# Patient Record
Sex: FEMALE | Race: WHITE | NOT HISPANIC OR LATINO | Employment: UNEMPLOYED | URBAN - METROPOLITAN AREA
[De-identification: names, ages, dates, MRNs, and addresses within clinical notes are randomized per-mention and may not be internally consistent; named-entity substitution may affect disease eponyms.]

---

## 2017-07-09 ENCOUNTER — HOSPITAL ENCOUNTER (EMERGENCY)
Facility: HOSPITAL | Age: 37
Discharge: HOME | End: 2017-07-10
Attending: EMERGENCY MEDICINE
Payer: COMMERCIAL

## 2017-07-09 ENCOUNTER — APPOINTMENT (EMERGENCY)
Dept: RADIOLOGY | Facility: HOSPITAL | Age: 37
End: 2017-07-09
Payer: COMMERCIAL

## 2017-07-09 VITALS
OXYGEN SATURATION: 100 % | DIASTOLIC BLOOD PRESSURE: 87 MMHG | SYSTOLIC BLOOD PRESSURE: 154 MMHG | TEMPERATURE: 97.9 F | HEART RATE: 100 BPM | BODY MASS INDEX: 34.04 KG/M2 | WEIGHT: 186.1 LBS | RESPIRATION RATE: 18 BRPM

## 2017-07-09 DIAGNOSIS — S63.502A LEFT WRIST SPRAIN, INITIAL ENCOUNTER: Primary | ICD-10-CM

## 2017-07-09 DIAGNOSIS — S63.92XA SPRAIN OF LEFT HAND, INITIAL ENCOUNTER: ICD-10-CM

## 2017-07-09 PROCEDURE — 73110 X-RAY EXAM OF WRIST: CPT

## 2017-07-09 PROCEDURE — 73130 X-RAY EXAM OF HAND: CPT

## 2017-07-10 PROCEDURE — 99283 EMERGENCY DEPT VISIT LOW MDM: CPT

## 2017-09-27 ENCOUNTER — APPOINTMENT (EMERGENCY)
Dept: ULTRASOUND IMAGING | Facility: HOSPITAL | Age: 37
End: 2017-09-27
Payer: COMMERCIAL

## 2017-09-27 ENCOUNTER — HOSPITAL ENCOUNTER (EMERGENCY)
Facility: HOSPITAL | Age: 37
Discharge: HOME | End: 2017-09-27
Attending: EMERGENCY MEDICINE | Admitting: EMERGENCY MEDICINE
Payer: COMMERCIAL

## 2017-09-27 VITALS
TEMPERATURE: 98.3 F | HEART RATE: 96 BPM | WEIGHT: 170 LBS | BODY MASS INDEX: 31.09 KG/M2 | DIASTOLIC BLOOD PRESSURE: 87 MMHG | SYSTOLIC BLOOD PRESSURE: 139 MMHG | RESPIRATION RATE: 16 BRPM | OXYGEN SATURATION: 100 %

## 2017-09-27 DIAGNOSIS — O20.0 THREATENED ABORTION: Primary | ICD-10-CM

## 2017-09-27 LAB
ABO GROUP BLD: NORMAL
ALBUMIN SERPL BCP-MCNC: 3.6 G/DL (ref 3.5–5)
ALP SERPL-CCNC: 69 U/L (ref 46–116)
ALT SERPL W P-5'-P-CCNC: 25 U/L (ref 12–78)
ANION GAP SERPL CALCULATED.3IONS-SCNC: 7 MMOL/L (ref 4–13)
AST SERPL W P-5'-P-CCNC: 13 U/L (ref 5–45)
B-HCG SERPL-ACNC: ABNORMAL MIU/ML
BASOPHILS # BLD AUTO: 0.02 THOUSANDS/ΜL (ref 0–0.1)
BASOPHILS NFR BLD AUTO: 0 % (ref 0–1)
BILIRUB SERPL-MCNC: 0.7 MG/DL (ref 0.2–1)
BLD GP AB SCN SERPL QL: NEGATIVE
BUN SERPL-MCNC: 6 MG/DL (ref 5–25)
CALCIUM SERPL-MCNC: 8.7 MG/DL (ref 8.3–10.1)
CHLORIDE SERPL-SCNC: 103 MMOL/L (ref 100–108)
CLARITY, POC: CLEAR
CO2 SERPL-SCNC: 27 MMOL/L (ref 21–32)
COLOR, POC: YELLOW
CREAT SERPL-MCNC: 0.45 MG/DL (ref 0.6–1.3)
EOSINOPHIL # BLD AUTO: 0.14 THOUSAND/ΜL (ref 0–0.61)
EOSINOPHIL NFR BLD AUTO: 2 % (ref 0–6)
ERYTHROCYTE [DISTWIDTH] IN BLOOD BY AUTOMATED COUNT: 16.5 % (ref 11.6–15.1)
EXT BILIRUBIN, UA: NORMAL
EXT BLOOD URINE: NORMAL
EXT GLUCOSE, UA: NORMAL
EXT KETONES: NORMAL
EXT NITRITE, UA: NORMAL
EXT PH, UA: 6
EXT PREG TEST URINE: POSITIVE
EXT PROTEIN, UA: 100
EXT SPECIFIC GRAVITY, UA: 1.01
EXT UROBILINOGEN: 0.2
GFR SERPL CREATININE-BSD FRML MDRD: 130 ML/MIN/1.73SQ M
GLUCOSE SERPL-MCNC: 90 MG/DL (ref 65–140)
HCT VFR BLD AUTO: 27.4 % (ref 34.8–46.1)
HGB BLD-MCNC: 9.8 G/DL (ref 11.5–15.4)
LYMPHOCYTES # BLD AUTO: 2.11 THOUSANDS/ΜL (ref 0.6–4.47)
LYMPHOCYTES NFR BLD AUTO: 25 % (ref 14–44)
MCH RBC QN AUTO: 33.2 PG (ref 26.8–34.3)
MCHC RBC AUTO-ENTMCNC: 35.8 G/DL (ref 31.4–37.4)
MCV RBC AUTO: 93 FL (ref 82–98)
MONOCYTES # BLD AUTO: 0.45 THOUSAND/ΜL (ref 0.17–1.22)
MONOCYTES NFR BLD AUTO: 5 % (ref 4–12)
NEUTROPHILS # BLD AUTO: 5.63 THOUSANDS/ΜL (ref 1.85–7.62)
NEUTS SEG NFR BLD AUTO: 68 % (ref 43–75)
PLATELET # BLD AUTO: 269 THOUSANDS/UL (ref 149–390)
PMV BLD AUTO: 9.9 FL (ref 8.9–12.7)
POTASSIUM SERPL-SCNC: 3.7 MMOL/L (ref 3.5–5.3)
PROT SERPL-MCNC: 7.1 G/DL (ref 6.4–8.2)
RBC # BLD AUTO: 2.95 MILLION/UL (ref 3.81–5.12)
RH BLD: POSITIVE
SODIUM SERPL-SCNC: 137 MMOL/L (ref 136–145)
SPECIMEN EXPIRATION DATE: NORMAL
WBC # BLD AUTO: 8.35 THOUSAND/UL (ref 4.31–10.16)
WBC # BLD EST: NORMAL 10*3/UL

## 2017-09-27 PROCEDURE — 99284 EMERGENCY DEPT VISIT MOD MDM: CPT

## 2017-09-27 PROCEDURE — 86850 RBC ANTIBODY SCREEN: CPT | Performed by: PHYSICIAN ASSISTANT

## 2017-09-27 PROCEDURE — 84702 CHORIONIC GONADOTROPIN TEST: CPT | Performed by: PHYSICIAN ASSISTANT

## 2017-09-27 PROCEDURE — 86900 BLOOD TYPING SEROLOGIC ABO: CPT | Performed by: PHYSICIAN ASSISTANT

## 2017-09-27 PROCEDURE — 81002 URINALYSIS NONAUTO W/O SCOPE: CPT | Performed by: PHYSICIAN ASSISTANT

## 2017-09-27 PROCEDURE — 36415 COLL VENOUS BLD VENIPUNCTURE: CPT | Performed by: PHYSICIAN ASSISTANT

## 2017-09-27 PROCEDURE — 80053 COMPREHEN METABOLIC PANEL: CPT | Performed by: PHYSICIAN ASSISTANT

## 2017-09-27 PROCEDURE — 85025 COMPLETE CBC W/AUTO DIFF WBC: CPT | Performed by: PHYSICIAN ASSISTANT

## 2017-09-27 PROCEDURE — 86901 BLOOD TYPING SEROLOGIC RH(D): CPT | Performed by: PHYSICIAN ASSISTANT

## 2017-09-27 PROCEDURE — 76801 OB US < 14 WKS SINGLE FETUS: CPT

## 2017-09-27 PROCEDURE — 81025 URINE PREGNANCY TEST: CPT | Performed by: PHYSICIAN ASSISTANT

## 2017-09-27 NOTE — DISCHARGE INSTRUCTIONS
Threatened Miscarriage   WHAT YOU NEED TO KNOW:   A threatened miscarriage occurs when you have vaginal bleeding within the first 20 weeks of pregnancy  It means that a miscarriage may happen  A threatened miscarriage may also be called a threatened   DISCHARGE INSTRUCTIONS:   Return to the emergency department if:   · You feel weak or faint  · Your pain or cramping in your abdomen or back gets worse  · You have vaginal bleeding that soaks 1 or more pads in an hour  · You pass material that looks like tissue or large clots  Contact your healthcare provider or obstetrician if:   · You have a fever  · You have trouble urinating, burning when you urinate, or feel a need to urinate often  · You have new or worsening vaginal bleeding  · You have vaginal pain or itching, or vaginal discharge that is yellow, green, or foul-smelling  · You have questions or concerns about your condition or care  Self-care: The following may help you manage your symptoms and decrease your risk for a miscarriage:  · Do not put anything in your vagina  Do not have sex, douche, or use tampons  These actions may increase your risk for infection and miscarriage  · Rest as directed  Do not exercise or do strenuous activities  These activities may cause  labor or miscarriage  Ask your healthcare provider what activities are okay to do  Stay healthy during pregnancy:   · Eat a variety of healthy foods  Healthy foods can help you get extra protein, water, and calories that you need while you are pregnant  Healthy foods include fruits, vegetables, whole-grain breads, low-fat dairy products, beans, lean meats, and fish  Avoid raw or undercooked meat and fish  Ask your healthcare provider if you need a special diet  · Take prenatal vitamins as directed  These help you get the right amount of vitamins and minerals  They may also decrease the risk of certain birth defects      · Do not drink alcohol or use illegal drugs  These can increase your risk for a miscarriage or harm your baby  · Do not smoke  Nicotine and other chemicals in cigarettes and cigars can harm your baby and cause miscarriage or  labor  Ask your healthcare provider for information if you currently smoke and need help to quit  E-cigarettes or smokeless tobacco still contain nicotine  Do not use these products  · Decrease your risk for an infection  Always wash your hands before eating or preparing meals  Do not spend time with people who are sick  Ask your healthcare provider if you need immunizations such as the flu or hepatitis B vaccine  Immunizations may decrease your risk for infections that could cause a miscarriage  · Manage your medical conditions  Keep your blood pressure and blood sugars under control  Maintain a healthy weight during pregnancy  Follow up with your obstetrician as directed: You may need to see your obstetrician frequently for ultrasounds or blood tests  Write down your questions so you remember to ask them during your visits  ©  [address] Information is for End User's use only and may not be sold, redistributed or otherwise used for commercial purposes  All illustrations and images included in CareNotes® are the copyrighted property of [employer]  or Alberto Saenz  The above information is an  only  It is not intended as medical advice for individual conditions or treatments  Talk to your doctor, nurse or pharmacist before following any medical regimen to see if it is safe and effective for you

## 2017-09-27 NOTE — ED PROVIDER NOTES
History  Chief Complaint   Patient presents with    Vaginal Bleeding - Pregnant     PT IS ABOUT 7-8 WEEKS PREGNANT AND STARTED WITH BLEEDING TODAY  INCREASING IN AMOUNT  42-year-old female presents to the emergency department with complaints of vaginal bleeding  States she is approximately 7-8 weeks pregnant by history  Last menstrual period was in the last week of July  States that she has her 1st OB appointment on Monday October 2nd  Currently seen at [facility]  States that initially bleeding was only when she wipes yesterday but today noticed a small amount of increased spotting  Mild abdominal cramping last night which has resolved this morning  No fevers  Denies nausea, vomiting, diarrhea  States that this is her 3rd pregnancy she does have 2 children at home  No previous issues with bleeding during pregnancy  History provided by:  Patient   used: No        None       Past Medical History:   Diagnosis Date    Anemia     Endometriosis     Pre-eclampsia in third trimester 4/2/2016    Varicella     had chicken pox       Past Surgical History:   Procedure Laterality Date    APPENDECTOMY      EXPLORATORY LAPAROTOMY      for endometriosis    TONSILLECTOMY         Family History   Problem Relation Age of Onset    Family history unknown: Yes     I have reviewed and agree with the history as documented  Social History   Substance Use Topics    Smoking status: Current Every Day Smoker     Packs/day: 0 50     Types: Cigarettes    Smokeless tobacco: Not on file    Alcohol use No        Review of Systems   Constitutional: Negative for activity change, chills and fever  HENT: Negative for congestion, ear pain and sore throat  Eyes: Negative for pain  Respiratory: Negative for cough, chest tightness, shortness of breath and wheezing  Cardiovascular: Negative for chest pain  Gastrointestinal: Negative for abdominal pain, nausea and vomiting  Endocrine: Negative for cold intolerance and heat intolerance  Genitourinary: Positive for vaginal bleeding  Negative for difficulty urinating, dysuria, flank pain, hematuria and urgency  Musculoskeletal: Negative for back pain and myalgias  Skin: Negative for color change and rash  Allergic/Immunologic: Negative for food allergies  Neurological: Negative for dizziness, syncope, weakness, numbness and headaches  Psychiatric/Behavioral: Negative for agitation and behavioral problems  All other systems reviewed and are negative  Physical Exam  ED Triage Vitals [09/27/17 1304]   Temperature Pulse Respirations Blood Pressure SpO2   98 3 °F (36 8 °C) 91 18 167/78 100 %      Temp Source Heart Rate Source Patient Position - Orthostatic VS BP Location FiO2 (%)   Oral Monitor Sitting Left arm --      Pain Score       No Pain           Physical Exam   Constitutional: She is oriented to person, place, and time  Vital signs are normal  She appears well-developed and well-nourished  HENT:   Head: Normocephalic and atraumatic  Right Ear: Hearing and external ear normal    Left Ear: Hearing and external ear normal    Nose: Nose normal    Mouth/Throat: Mucous membranes are normal    Eyes: Conjunctivae, EOM and lids are normal    Neck: Trachea normal and normal range of motion  Cardiovascular: Normal rate, regular rhythm and normal heart sounds  Exam reveals no gallop and no friction rub  No murmur heard  Pulmonary/Chest: Effort normal and breath sounds normal  No respiratory distress  She has no decreased breath sounds  She has no wheezes  She has no rhonchi  She has no rales  Abdominal: Soft  Normal appearance and bowel sounds are normal  There is no tenderness  Musculoskeletal: Normal range of motion  Neurological: She is alert and oriented to person, place, and time  Skin: Skin is warm and dry  No rash noted  No erythema  Psychiatric: She has a normal mood and affect   Her behavior is normal    Nursing note and vitals reviewed  ED Medications  Medications - No data to display    Diagnostic Studies  Labs Reviewed   CBC AND DIFFERENTIAL - Abnormal        Result Value Ref Range Status    RBC 2 95 (*) 3 81 - 5 12 Million/uL Final    Hemoglobin 9 8 (*) 11 5 - 15 4 g/dL Final    Hematocrit 27 4 (*) 34 8 - 46 1 % Final    RDW 16 5 (*) 11 6 - 15 1 % Final    WBC 8 35  4 31 - 10 16 Thousand/uL Final    MCV 93  82 - 98 fL Final    MCH 33 2  26 8 - 34 3 pg Final    MCHC 35 8  31 4 - 37 4 g/dL Final    MPV 9 9  8 9 - 12 7 fL Final    Platelets 042  730 - 390 Thousands/uL Final    Neutrophils Relative 68  43 - 75 % Final    Lymphocytes Relative 25  14 - 44 % Final    Monocytes Relative 5  4 - 12 % Final    Eosinophils Relative 2  0 - 6 % Final    Basophils Relative 0  0 - 1 % Final    Neutrophils Absolute 5 63  1 85 - 7 62 Thousands/µL Final    Lymphocytes Absolute 2 11  0 60 - 4 47 Thousands/µL Final    Monocytes Absolute 0 45  0 17 - 1 22 Thousand/µL Final    Eosinophils Absolute 0 14  0 00 - 0 61 Thousand/µL Final    Basophils Absolute 0 02  0 00 - 0 10 Thousands/µL Final   COMPREHENSIVE METABOLIC PANEL - Abnormal     Creatinine 0 45 (*) 0 60 - 1 30 mg/dL Final    Comment: Standardized to IDMS reference method    Sodium 137  136 - 145 mmol/L Final    Potassium 3 7  3 5 - 5 3 mmol/L Final    Chloride 103  100 - 108 mmol/L Final    CO2 27  21 - 32 mmol/L Final    Anion Gap 7  4 - 13 mmol/L Final    BUN 6  5 - 25 mg/dL Final    Glucose 90  65 - 140 mg/dL Final    Comment:   If the patient is fasting, the ADA then defines impaired fasting glucose as > 100 mg/dL and diabetes as > or equal to 123 mg/dL  Specimen collection should occur prior to Sulfasalazine administration due to the potential for falsely depressed results  Specimen collection should occur prior to Sulfapyridine administration due to the potential for falsely elevated results      Calcium 8 7  8 3 - 10 1 mg/dL Final    AST 13  5 - 45 U/L Final    Comment:   Specimen collection should occur prior to Sulfasalazine administration due to the potential for falsely depressed results  ALT 25  12 - 78 U/L Final    Comment:   Specimen collection should occur prior to Sulfasalazine administration due to the potential for falsely depressed results  Alkaline Phosphatase 69  46 - 116 U/L Final    Total Protein 7 1  6 4 - 8 2 g/dL Final    Albumin 3 6  3 5 - 5 0 g/dL Final    Total Bilirubin 0 70  0 20 - 1 00 mg/dL Final    eGFR 130  ml/min/1 73sq m Final    Narrative:     National Kidney Disease Education Program recommendations are as follows:  GFR calculation is accurate only with a steady state creatinine  Chronic Kidney disease less than 60 ml/min/1 73 sq  meters  Kidney failure less than 15 ml/min/1 73 sq  meters     HCG, QUANTITATIVE - Abnormal     HCG, Quant 24,675 (*) <=6 mIU/mL Final    Narrative:      Expected Ranges:     Approximate               Approximate HCG  Gestation age          Concentration ( mIU/mL)  _____________          ______________________   Fredia Guppy                      HCG values  0 2-1                       5-50  1-2                           2-3                         100-5000  3-4                         500-35024  4-5                         1000-65299  5-6                         [id number]  6-8                         [id number]  8-12                        [id number]   POCT URINALYSIS DIPSTICK - Normal    Color, UA yellow   Final    Clarity, UA clear   Final    EXT Glucose, UA neg   Final    EXT Bilirubin, UA (Ref: Negative) neg   Final    EXT Ketones, UA (Ref: Negative) neg   Final    EXT Spec Grav, UA 1 015   Final    EXT Blood, UA (Ref: Negative) large   Final    EXT pH, UA 6 0   Final    EXT Protein, UA (Ref: Negative) 100   Final    EXT Urobilinogen, UA (Ref: 0 2- 1 0) 0 2   Final    EXT Leukocytes, UA (Ref: Negative) neg   Final    EXT Nitrite, UA (Ref: Negative) neg   Final   POCT PREGNANCY, URINE - Normal    EXT PREG TEST UR (Ref: Negative) positive   Final   TYPE AND SCREEN    ABO Grouping B   Final    Rh Factor Positive   Final    Antibody Screen Negative   Final    Specimen Expiration Date [id number]   Final       US OB < 14 weeks with transvaginal   Final Result      An intrauterine gestational sac is identified, according to sac size estimated gestational age approximately 5 weeks 6 days  No fetal pole or cardiac activity is seen  A yolk sac is noted  Based on current consensus literature terminology, this is     an intrauterine pregnancy of uncertain viability   Serial quantitative beta hCG levels and/or follow-up ultrasound recommended as clinically appropriate      Reference: N Engl J Med 868;95 pp  5474 to 1451  Workstation performed: [id number]             Procedures  Procedures      Phone Contacts  ED Phone Contact    ED Course  ED Course                                MDM  Number of Diagnoses or Management Options  Threatened :   Diagnosis management comments: Differential diagnosis includes but not limited to:  1st trimester bleeding, inevitable , threatened   Amount and/or Complexity of Data Reviewed  Clinical lab tests: reviewed and ordered  Tests in the radiology section of CPT®: ordered and reviewed      CritCare Time    Disposition  Final diagnoses:   Threatened      ED Disposition     ED Disposition Condition Comment    Discharge  Gaviota Santizo discharge to home/self care  Condition at discharge: Stable        Follow-up Information     Follow up With Specialties Details Why Contact Info    Timmy Tapia MD Obstetrics and Gynecology In 5 days  [address] [facility] [address]  [address] [facility]  [phone]          There are no discharge medications for this patient  No discharge procedures on file      ED Provider  Electronically Signed by       Betsy Dexter PA-C  [phone]

## 2017-09-29 ENCOUNTER — HOSPITAL ENCOUNTER (EMERGENCY)
Facility: HOSPITAL | Age: 37
Discharge: HOME | End: 2017-09-29
Attending: EMERGENCY MEDICINE | Admitting: EMERGENCY MEDICINE
Payer: COMMERCIAL

## 2017-09-29 VITALS
RESPIRATION RATE: 16 BRPM | DIASTOLIC BLOOD PRESSURE: 70 MMHG | TEMPERATURE: 98.1 F | OXYGEN SATURATION: 100 % | HEART RATE: 85 BPM | SYSTOLIC BLOOD PRESSURE: 150 MMHG

## 2017-09-29 DIAGNOSIS — N39.0 UTI (URINARY TRACT INFECTION): ICD-10-CM

## 2017-09-29 DIAGNOSIS — O03.9 MISCARRIAGE: Primary | ICD-10-CM

## 2017-09-29 LAB
ABO GROUP BLD: NORMAL
B-HCG SERPL-ACNC: 8901 MIU/ML
BACTERIA UR QL AUTO: ABNORMAL /HPF
BASOPHILS # BLD AUTO: 0.02 THOUSANDS/ΜL (ref 0–0.1)
BASOPHILS NFR BLD AUTO: 0 % (ref 0–1)
BILIRUB UR QL STRIP: NEGATIVE
BLD GP AB SCN SERPL QL: NEGATIVE
CLARITY UR: CLEAR
COLOR UR: YELLOW
EOSINOPHIL # BLD AUTO: 0.32 THOUSAND/ΜL (ref 0–0.61)
EOSINOPHIL NFR BLD AUTO: 3 % (ref 0–6)
ERYTHROCYTE [DISTWIDTH] IN BLOOD BY AUTOMATED COUNT: 16.9 % (ref 11.6–15.1)
GLUCOSE UR STRIP-MCNC: NEGATIVE MG/DL
HCT VFR BLD AUTO: 28.3 % (ref 34.8–46.1)
HGB BLD-MCNC: 10.1 G/DL (ref 11.5–15.4)
HGB UR QL STRIP.AUTO: ABNORMAL
KETONES UR STRIP-MCNC: NEGATIVE MG/DL
LEUKOCYTE ESTERASE UR QL STRIP: NEGATIVE
LYMPHOCYTES # BLD AUTO: 3.24 THOUSANDS/ΜL (ref 0.6–4.47)
LYMPHOCYTES NFR BLD AUTO: 29 % (ref 14–44)
MCH RBC QN AUTO: 33.3 PG (ref 26.8–34.3)
MCHC RBC AUTO-ENTMCNC: 35.7 G/DL (ref 31.4–37.4)
MCV RBC AUTO: 93 FL (ref 82–98)
MONOCYTES # BLD AUTO: 0.72 THOUSAND/ΜL (ref 0.17–1.22)
MONOCYTES NFR BLD AUTO: 7 % (ref 4–12)
NEUTROPHILS # BLD AUTO: 6.72 THOUSANDS/ΜL (ref 1.85–7.62)
NEUTS SEG NFR BLD AUTO: 61 % (ref 43–75)
NITRITE UR QL STRIP: POSITIVE
NON-SQ EPI CELLS URNS QL MICRO: ABNORMAL /HPF
PH UR STRIP.AUTO: 6 [PH] (ref 4.5–8)
PLATELET # BLD AUTO: 272 THOUSANDS/UL (ref 149–390)
PMV BLD AUTO: 9.8 FL (ref 8.9–12.7)
PROT UR STRIP-MCNC: ABNORMAL MG/DL
RBC # BLD AUTO: 3.03 MILLION/UL (ref 3.81–5.12)
RBC #/AREA URNS AUTO: ABNORMAL /HPF
RH BLD: POSITIVE
SP GR UR STRIP.AUTO: 1.02 (ref 1–1.03)
SPECIMEN EXPIRATION DATE: NORMAL
UROBILINOGEN UR QL STRIP.AUTO: 0.2 E.U./DL
WBC # BLD AUTO: 11.02 THOUSAND/UL (ref 4.31–10.16)
WBC #/AREA URNS AUTO: ABNORMAL /HPF

## 2017-09-29 PROCEDURE — 96375 TX/PRO/DX INJ NEW DRUG ADDON: CPT

## 2017-09-29 PROCEDURE — 36415 COLL VENOUS BLD VENIPUNCTURE: CPT | Performed by: EMERGENCY MEDICINE

## 2017-09-29 PROCEDURE — 86901 BLOOD TYPING SEROLOGIC RH(D): CPT | Performed by: EMERGENCY MEDICINE

## 2017-09-29 PROCEDURE — 81001 URINALYSIS AUTO W/SCOPE: CPT | Performed by: EMERGENCY MEDICINE

## 2017-09-29 PROCEDURE — 96374 THER/PROPH/DIAG INJ IV PUSH: CPT

## 2017-09-29 PROCEDURE — 84702 CHORIONIC GONADOTROPIN TEST: CPT | Performed by: EMERGENCY MEDICINE

## 2017-09-29 PROCEDURE — 86850 RBC ANTIBODY SCREEN: CPT | Performed by: EMERGENCY MEDICINE

## 2017-09-29 PROCEDURE — 86900 BLOOD TYPING SEROLOGIC ABO: CPT | Performed by: EMERGENCY MEDICINE

## 2017-09-29 PROCEDURE — 99284 EMERGENCY DEPT VISIT MOD MDM: CPT

## 2017-09-29 PROCEDURE — 85025 COMPLETE CBC W/AUTO DIFF WBC: CPT | Performed by: EMERGENCY MEDICINE

## 2017-09-29 RX ORDER — NITROFURANTOIN 25; 75 MG/1; MG/1
100 CAPSULE ORAL ONCE
Status: COMPLETED | OUTPATIENT
Start: 2017-09-29 | End: 2017-09-29

## 2017-09-29 RX ORDER — OXYCODONE HYDROCHLORIDE AND ACETAMINOPHEN 5; 325 MG/1; MG/1
1 TABLET ORAL EVERY 6 HOURS PRN
Qty: 12 TABLET | Refills: 0 | Status: SHIPPED | OUTPATIENT
Start: 2017-09-29 | End: 2017-10-09

## 2017-09-29 RX ORDER — ONDANSETRON 2 MG/ML
4 INJECTION INTRAMUSCULAR; INTRAVENOUS ONCE
Status: COMPLETED | OUTPATIENT
Start: 2017-09-29 | End: 2017-09-29

## 2017-09-29 RX ORDER — NAPROXEN 500 MG/1
500 TABLET ORAL 2 TIMES DAILY WITH MEALS
Qty: 30 TABLET | Refills: 0 | Status: SHIPPED | OUTPATIENT
Start: 2017-09-29 | End: 2018-07-07 | Stop reason: HOSPADM

## 2017-09-29 RX ORDER — NITROFURANTOIN 25; 75 MG/1; MG/1
100 CAPSULE ORAL 2 TIMES DAILY
Qty: 10 CAPSULE | Refills: 0 | Status: SHIPPED | OUTPATIENT
Start: 2017-09-29 | End: 2018-07-07 | Stop reason: HOSPADM

## 2017-09-29 RX ADMIN — HYDROMORPHONE HYDROCHLORIDE 0.5 MG: 1 INJECTION, SOLUTION INTRAMUSCULAR; INTRAVENOUS; SUBCUTANEOUS at 21:38

## 2017-09-29 RX ADMIN — NITROFURANTOIN MONOHYDRATE AND NITROFURANTOIN MACROCRYSTALLINE 100 MG: 75; 25 CAPSULE ORAL at 22:59

## 2017-09-29 RX ADMIN — ONDANSETRON 4 MG: 2 INJECTION INTRAMUSCULAR; INTRAVENOUS at 21:38

## 2017-09-30 NOTE — DISCHARGE INSTRUCTIONS
Miscarriage   WHAT YOU NEED TO KNOW:   A miscarriage is the loss of a fetus within the first 20 weeks of pregnancy  A miscarriage may also be called a spontaneous  or an early pregnancy loss  DISCHARGE INSTRUCTIONS:   Return to the emergency department if:   · You have foul-smelling drainage or pus coming from your vagina  · You have heavy vaginal bleeding and soak 1 pad or more in an hour  · You have severe abdominal pain  · You feel like your heart is beating faster than normal      · You feel extremely weak or dizzy  Contact your healthcare provider if:   · You have a fever greater than 100 4°F or chills  · You have extreme sadness, grief, or feel unable to cope with what has happened  · You have questions or concerns about your condition or care  Self-care:   · Do not put anything in your vagina for 2 weeks or as directed  Do not use tampons, douche, or have sex  These actions can cause infection and pain  · Use sanitary pads as needed  You may have light bleeding or spotting for 2 weeks  · Do not take a bath or go swimming for 2 weeks or as directed  These actions may increase your risk for an infection  Take showers only  · Rest as needed  Slowly start to do more each day  Return to your daily activities as directed  · Talk to your healthcare provider about birth control  If you would like to prevent another pregnancy, ask your healthcare provider which type of birth control is best for you  · Join a support group or therapy to help you cope  A miscarriage may be very difficult for you, your partner, and other members of your family  There is no right way to feel after a miscarriage  You may feel overwhelming grief or other emotions  It may be helpful to talk to a friend, family member, or counselor about your feelings  You may worry that you could have another miscarriage  Talk to your healthcare provider about your concerns   He may be able to help you reduce the risk for another miscarriage  He may also help you find ways to cope with grief  For more information:   · [employer] [facility] [employer]  [address] [place] [address]  Phone: [phone]  Phone: [phone]  Web Address: http://[employer]/  org  · [facility]  [address] [facility] , [address] [place] [address]  Web Address: [employer]  Follow up with your healthcare provider as directed: You may need to see your healthcare provider for blood tests or an ultrasound  Write down your questions so you remember to ask them during your visits  © 2017 [address] [facility] Information is for End User's use only and may not be sold, redistributed or otherwise used for commercial purposes  All illustrations and images included in CareNotes® are the copyrighted property of A [employer] A M [employer] Inc  or Alberto Saenz  The above information is an  only  It is not intended as medical advice for individual conditions or treatments  Talk to your doctor, nurse or pharmacist before following any medical regimen to see if it is safe and effective for you  Urinary Tract Infection in Women, Ambulatory Care   GENERAL INFORMATION:   A urinary tract infection (UTI)  is caused by bacteria that get inside your urinary tract  Most bacteria that enter your urinary tract are expelled when you urinate  If the bacteria stay in your urinary tract, you may get an infection  Your urinary tract includes your kidneys, ureters, bladder, and urethra  Urine is made in your kidneys, and it flows from the ureters to the bladder  Urine leaves the bladder through the urethra  A UTI is more common in your lower urinary tract, which includes your bladder and urethra          Common symptoms include the following:   · Urinating more often or waking from sleep to urinate    · Pain or burning when you urinate    · Pain or pressure in your lower abdomen     · Urine that smells bad    · Blood in your urine    · Leaking urine  Seek immediate care for the following symptoms:   · Urinating very little or not at all    · Vomiting    · Shaking chills with a fever    · Side or back pain that gets worse  Treatment for a UTI  may include medicines to treat a bacterial infection  You may also need medicines to decrease pain and burning, or decrease the urge to urinate often  Prevent a UTI:   · Urinate when you feel the urge  Do not hold your urine  Urinate as soon as you feel you have to  · Drink liquids as directed  Ask how much liquid to drink each day and which liquids are best for you  You may need to drink more fluids than usual to help flush out the bacteria  Do not drink alcohol, caffeine, and citrus juices  These can irritate your bladder and increase your symptoms  · Apply heat  on your abdomen for 20 to 30 minutes every 2 hours for as many days as directed  Heat helps decrease discomfort and pressure in your bladder  Follow up with your healthcare provider as directed:  Write down your questions so you remember to ask them during your visits  CARE AGREEMENT:   You have the right to help plan your care  Learn about your health condition and how it may be treated  Discuss treatment options with your caregivers to decide what care you want to receive  You always have the right to refuse treatment  The above information is an  only  It is not intended as medical advice for individual conditions or treatments  Talk to your doctor, nurse or pharmacist before following any medical regimen to see if it is safe and effective for you  © 2014 [address] is for End User's use only and may not be sold, redistributed or otherwise used for commercial purposes  All illustrations and images included in CareNotes® are the copyrighted property of A D A M [employer] Inc  or Alberto Saenz

## 2017-09-30 NOTE — ED PROVIDER NOTES
History  Chief Complaint   Patient presents with    Threatened Miscarriage     Pt states she was seen here on Wednesday and told she was starting a miscarriage and told to come back if the bleeding and cramping were bad, pt states she passed the sac around 1200 and the pain started getting bad around 130     71-year-old female comes in for pain and vaginal bleeding  Patient was seen here Wednesday told she was having a miscarriage in to return if the bleeding and cramping were states that she pain it past the sac around noon and the pain got much worse around 1500 hours  No nausea no vomiting no fever no chills no chest pain or shortness of breath  Patient did not take anything for pain        History provided by:  Patient  Pregnancy Problem   Quality:  Bright red, passed tissue and heavier than menses  Severity:  Moderate  Onset quality:  Sudden  Duration:  9 hours  Timing:  Constant  Progression:  Worsening  Chronicity:  New  Prior pregnancy: yes    Pregnancy confirmed by ultrasound: yes    Gestational age:  Five weeks  Prenatal care:   No prenatal care  Context: spontaneously    Ineffective treatments:  None tried  Associated symptoms: abdominal pain and vaginal discharge    Associated symptoms: no back pain, no fatigue and no fever    Abdominal pain:     Location:  Suprapubic    Quality: cramping      Severity:  Severe    Onset quality:  Sudden    Duration:  9 hours    Timing:  Constant    Progression:  Worsening    Chronicity:  New  Vaginal discharge:     Quality:  Bloody    Severity:  Moderate    Onset quality:  Sudden    Duration:  9 hours    Timing:  Constant    Progression:  Worsening    Chronicity:  New  Risk factors: no bleeding disorder, no ovarian torsion and no STD        None       Past Medical History:   Diagnosis Date    Anemia     Endometriosis     Pre-eclampsia in third trimester 4/2/2016    Varicella     had chicken pox       Past Surgical History:   Procedure Laterality Date    APPENDECTOMY      EXPLORATORY LAPAROTOMY      for endometriosis    TONSILLECTOMY         Family History   Problem Relation Age of Onset    Family history unknown: Yes     I have reviewed and agree with the history as documented  Social History   Substance Use Topics    Smoking status: Current Every Day Smoker     Packs/day: 0 50     Types: Cigarettes    Smokeless tobacco: Not on file    Alcohol use No        Review of Systems   Constitutional: Negative for fatigue and fever  HENT: Negative for congestion and ear pain  Eyes: Negative for discharge and redness  Respiratory: Negative for apnea, cough, shortness of breath and wheezing  Cardiovascular: Negative for chest pain  Gastrointestinal: Positive for abdominal pain  Negative for diarrhea  Endocrine: Negative for cold intolerance and polydipsia  Genitourinary: Positive for vaginal discharge  Negative for difficulty urinating and hematuria  Musculoskeletal: Negative for arthralgias and back pain  Skin: Negative for color change and rash  Allergic/Immunologic: Negative for environmental allergies and immunocompromised state  Neurological: Negative for numbness and headaches  Hematological: Negative for adenopathy  Does not bruise/bleed easily  Psychiatric/Behavioral: Negative for agitation and behavioral problems  Physical Exam  ED Triage Vitals [09/29/17 2110]   Temperature Pulse Respirations Blood Pressure SpO2   98 1 °F (36 7 °C) 89 18 (!) 173/94 100 %      Temp Source Heart Rate Source Patient Position - Orthostatic VS BP Location FiO2 (%)   Oral Monitor Sitting Left arm --      Pain Score       9           Physical Exam   Constitutional: She is oriented to person, place, and time  Vital signs are normal  She appears well-developed and well-nourished  Non-toxic appearance  HENT:   Head: Normocephalic and atraumatic     Right Ear: Tympanic membrane and external ear normal    Left Ear: Tympanic membrane and external ear normal    Nose: Nose normal  No rhinorrhea, sinus tenderness or nasal deformity  Mouth/Throat: Uvula is midline and oropharynx is clear and moist  Normal dentition  Eyes: Conjunctivae, EOM and lids are normal  Pupils are equal, round, and reactive to light  Right eye exhibits no discharge  Left eye exhibits no discharge  Neck: Trachea normal and normal range of motion  Neck supple  No JVD present  Carotid bruit is not present  Cardiovascular: Normal rate, regular rhythm, intact distal pulses and normal pulses  No extrasystoles are present  PMI is not displaced  Pulmonary/Chest: Effort normal and breath sounds normal  No accessory muscle usage  No respiratory distress  She has no wheezes  She has no rhonchi  She has no rales  Abdominal: Soft  Normal appearance and bowel sounds are normal  She exhibits no mass  There is no tenderness  There is no rigidity, no rebound and no guarding  Musculoskeletal:        Right shoulder: She exhibits normal range of motion, no bony tenderness, no swelling and no deformity  Cervical back: Normal  She exhibits normal range of motion, no tenderness, no bony tenderness and no deformity  Lymphadenopathy:     She has no cervical adenopathy  She has no axillary adenopathy  Neurological: She is alert and oriented to person, place, and time  She has normal strength and normal reflexes  No cranial nerve deficit or sensory deficit  GCS eye subscore is 4  GCS verbal subscore is 5  GCS motor subscore is 6  Skin: Skin is warm and dry  No rash noted  Psychiatric: She has a normal mood and affect  Her speech is normal and behavior is normal    Nursing note and vitals reviewed        ED Medications  Medications   HYDROmorphone (DILAUDID) 1 mg/mL injection 0 5 mg (0 5 mg Intravenous Given 9/29/17 2138)   ondansetron (ZOFRAN) injection 4 mg (4 mg Intravenous Given 9/29/17 2138)   nitrofurantoin (MACROBID) extended-release capsule 100 mg (100 mg Oral Given 9/29/17 4726)       Diagnostic Studies  Labs Reviewed   CBC AND DIFFERENTIAL - Abnormal        Result Value Ref Range Status    WBC 11 02 (*) 4 31 - 10 16 Thousand/uL Final    RBC 3 03 (*) 3 81 - 5 12 Million/uL Final    Hemoglobin 10 1 (*) 11 5 - 15 4 g/dL Final    Hematocrit 28 3 (*) 34 8 - 46 1 % Final    RDW 16 9 (*) 11 6 - 15 1 % Final    MCV 93  82 - 98 fL Final    MCH 33 3  26 8 - 34 3 pg Final    MCHC 35 7  31 4 - 37 4 g/dL Final    MPV 9 8  8 9 - 12 7 fL Final    Platelets 391  758 - 390 Thousands/uL Final    Neutrophils Relative 61  43 - 75 % Final    Lymphocytes Relative 29  14 - 44 % Final    Monocytes Relative 7  4 - 12 % Final    Eosinophils Relative 3  0 - 6 % Final    Basophils Relative 0  0 - 1 % Final    Neutrophils Absolute 6 72  1 85 - 7 62 Thousands/µL Final    Lymphocytes Absolute 3 24  0 60 - 4 47 Thousands/µL Final    Monocytes Absolute 0 72  0 17 - 1 22 Thousand/µL Final    Eosinophils Absolute 0 32  0 00 - 0 61 Thousand/µL Final    Basophils Absolute 0 02  0 00 - 0 10 Thousands/µL Final   UA W REFLEX TO MICROSCOPIC WITH REFLEX TO CULTURE - Abnormal     Nitrite, UA Positive (*) Negative Final    Protein, UA 30 (1+) (*) Negative mg/dl Final    Blood, UA Large (*) Negative Final    Color, UA Yellow   Final    Clarity, UA Clear   Final    Specific Gravity, UA 1 020  1 003 - 1 030 Final    pH, UA 6 0  4 5 - 8 0 Final    Leukocytes, UA Negative  Negative Final    Glucose, UA Negative  Negative mg/dl Final    Ketones, UA Negative  Negative mg/dl Final    Urobilinogen, UA 0 2  0 2, 1 0 E U /dl E U /dl Final    Bilirubin, UA Negative  Negative Final   HCG, QUANTITATIVE - Abnormal     HCG, Quant 8,901 (*) <=6 mIU/mL Final    Narrative:      Expected Ranges:     Approximate               Approximate HCG  Gestation age          Concentration ( mIU/mL)  _____________          ______________________   Suzan Jonatan                      HCG values  0 2-1                       5-50  1-2                           2-3 100-5000  3-4                         500-72499  4-5                         1000-01909  5-6                         [id number]  6-8                         12097-278489  8-12                        81808-095309   URINE MICROSCOPIC - Abnormal     RBC, UA 2-4 (*) None Seen, 0-5 /hpf Final    WBC, UA 1-2 (*) None Seen, 0-5, 5-55, 5-65 /hpf Final    Bacteria, UA Moderate (*) None Seen, Occasional /hpf Final    Epithelial Cells Occasional  None Seen, Occasional /hpf Final   TYPE AND SCREEN    ABO Grouping B   Final    Rh Factor Positive   Final    Antibody Screen Negative   Final    Specimen Expiration Date [id number]   Final       No orders to display       Procedures  Procedures      Phone Contacts  ED Phone Contact    ED Course  ED Course                                MDM  Number of Diagnoses or Management Options  Miscarriage: new and requires workup  UTI (urinary tract infection): new and requires workup     Amount and/or Complexity of Data Reviewed  Clinical lab tests: ordered and reviewed  Tests in the medicine section of CPT®: ordered and reviewed    Risk of Complications, Morbidity, and/or Mortality  General comments: Patient's hemoglobin is stable will give her pain meds and treat UTI and will have her follow up with her only OBGYN    Patient Progress  Patient progress: stable    CritCare Time    Disposition  Final diagnoses:   Miscarriage   UTI (urinary tract infection)     ED Disposition     ED Disposition Condition Comment    Discharge  Gaviota Santizo discharge to home/self care      Condition at discharge: Good        Follow-up Information     Follow up With Specialties Details Why [address]  [facility], DO Internal Medicine Schedule an appointment as soon as possible for a visit  [address] [address]  [phone]      your ob gyn  Schedule an appointment as soon as possible for a visit          Discharge Medication List as of 9/29/2017 10:55 PM START taking these medications    Details   naproxen (NAPROSYN) 500 mg tablet Take 1 tablet by mouth 2 (two) times a day with meals, Starting Fri 9/29/2017, Normal      nitrofurantoin (MACROBID) 100 mg capsule Take 1 capsule by mouth 2 (two) times a day, Starting Fri 9/29/2017, Normal      oxyCODONE-acetaminophen (PERCOCET) 5-325 mg per tablet Take 1 tablet by mouth every 6 (six) hours as needed for severe pain for up to 10 days Max Daily Amount: 4 tablets, Starting Fri 9/29/2017, Until Mon 10/9/2017, Print           No discharge procedures on file      ED Provider  Electronically Signed by       Alvarez Gonzalez DO  09/29/17 1600

## 2017-10-02 ENCOUNTER — GENERIC CONVERSION - ENCOUNTER (OUTPATIENT)
Dept: OTHER | Facility: OTHER | Age: 37
End: 2017-10-02

## 2017-10-02 DIAGNOSIS — O03.9 COMPLETE OR UNSPECIFIED SPONTANEOUS ABORTION WITHOUT COMPLICATION: ICD-10-CM

## 2017-10-02 DIAGNOSIS — R31.9 HEMATURIA: ICD-10-CM

## 2017-10-05 ENCOUNTER — HOSPITAL ENCOUNTER (OUTPATIENT)
Dept: ULTRASOUND IMAGING | Facility: HOSPITAL | Age: 37
Discharge: HOME | End: 2017-10-05
Payer: COMMERCIAL

## 2017-10-05 DIAGNOSIS — O03.9 COMPLETE OR UNSPECIFIED SPONTANEOUS ABORTION WITHOUT COMPLICATION: ICD-10-CM

## 2017-10-05 PROCEDURE — 76856 US EXAM PELVIC COMPLETE: CPT

## 2017-10-05 PROCEDURE — 76830 TRANSVAGINAL US NON-OB: CPT

## 2017-10-12 ENCOUNTER — GENERIC CONVERSION - ENCOUNTER (OUTPATIENT)
Dept: OTHER | Facility: OTHER | Age: 37
End: 2017-10-12

## 2018-01-10 NOTE — MISCELLANEOUS
Message  Message Free Text Note Form: REviewed patient's labs and found patient has moderate bacteria in her urine  Urine culture showed klebsiella pneumonia  Reviewing sensitivities showed cephalexin to be excellent choice  Due to history of PCN allergy, called patient to discuss severity of allergy  She stated she is unsure of what her allergy is and she has been told since she was a child she had PCN allergy  Discussed with patient and instructed her to take prescription for cephalexin  If she develops any adverse reactions to the medication she was instructed to call the office immediately  This was discussed with Dr Cl Starks  Signatures   Electronically signed by : Tawana Hazel DO; Feb 22 2016  7:01PM EST                       (Author)    Electronically signed by :  Sherri Hou MD; Feb 23 2016  8:14AM EST                       (Author)

## 2018-01-10 NOTE — MISCELLANEOUS
Provider Comments  Provider Comments:   PT NO SHOW FOR APPT      Signatures   Electronically signed by : Yasmine Landaverde, ; Apr 26 2016 10:26AM EST                       (Author)    Electronically signed by : Nicola Kauffman DO;  Apr 26 2016 12:54PM EST                       (Author)    Electronically signed by : Zane Pollock DO; May  5 2016  7:03PM EST                       (Author)

## 2018-01-10 NOTE — MISCELLANEOUS
Provider Comments  Provider Comments:   PT NO SHOW FOR APPT TODAY      Signatures   Electronically signed by : Crystal Jacobs, ; Feb 29 2016  1:36PM EST                       (Author)    Electronically signed by :  Sharifa Brown DO; Mar 10 2016  9:42PM EST                       (Author)    Electronically signed by : ANALISA Manuel ; Mar 15 2016  9:46PM EST                       (Review)

## 2018-01-11 NOTE — PROCEDURES
Procedures by Marcy Garza MD at 4/1/2016  10:30 PM      Author:  Marcy Garza MD Service:  Family Medicine Author Type:  Resident    Filed:  4/1/2016 11:14 PM Date of Service:  4/1/2016 10:30 PM Status:  Attested    :  Marcy Garza MD (Resident)  Cosigner: Nito Diallo MD at 4/2/2016 11:18 AM    Attestation signed by Nito Diallo MD at 4/2/2016 11:18 AM           Well-tolerated placement of montelongo balloon  Mgmt as above  Procedures    Cervical exam = Closed/Thick/High  With speculum and direct visualization the patient had a montelongo balloon successfully placed within the cervix  It was inflated with 60 mL saline and weighted with a 1L saline bag  The patient tolerated the procedure well  After a reassuring NST the  patient will be encouraged to ambulate             Received for:Dion Lenz   Apr 2 2016 11:18AM [facility] Standard Time

## 2018-01-12 NOTE — MISCELLANEOUS
Provider Comments  Provider Comments:   pt no showed today      Signatures   Electronically signed by : Tk Novak, ; Apr 26 2016  2:30PM EST                       (Author)    Electronically signed by :  Jie Garcia DO; May  2 2016  9:57PM EST                       (Author)    Electronically signed by : Trey Brunson DO; May  3 2016  9:59AM EST                       (Author)

## 2018-01-15 NOTE — MISCELLANEOUS
Message  Message Free Text Note Form: Called patient at home and informed her of her recent lab work results  Explained to her that the results indicate the safest option for her and for her baby would be to schedule her for induction  She understood this and agreed with this, and will be coming to the [facility], 4/1/16, at 2000 for cervical ripening and induction of labor  Signatures   Electronically signed by : Lester Turner DO; Apr 1 2016  4:15PM EST                       (Author)    Electronically signed by :  Ward Barrera MD; Apr  3 2016  9:27PM EST                       (Author)

## 2018-01-16 NOTE — PROGRESS NOTES
Assessment    1  Closed displaced fracture of fifth metacarpal bone of right hand, unspecified portion of   metacarpal, initial encounter (815 00) ([id number] [id number])    Plan  Closed displaced fracture of fifth metacarpal bone of right hand, unspecified portion of  metacarpal, initial encounter    · Follow-up Visit in 4 Weeks Evaluation and Treatment  Follow-up  Status: Hold For -  Scheduling  Requested for: [id number]    Discussion/Summary    The patient was seen and examined by Dr Susana James and myself today  A closed reduction under local with cast application was performed today, the patient tolerated very well  We did discuss that given her pregnancy our options are very limited work to treat this  We'll see her back in 4 weeks for follow-up evaluation  The patient's questions were answered  Of note, the patient's significant other was very vocal that helps that he was that are also not provide narcotics for Gaviota  We advised him that due to her pregnancy being in the second trimester, and pain medications being category C her pregnancy, we were not comfortable providing pain medications to Raymond Gamino because it could compromise the baby with developmental issues  We did advise him to consult his OB/GYN and we would be happy to speak to this physician regarding Gaviota's care  He was unhappy with these responses, very belligerent in his acts  Throughout this Gaviota remained calm and kept asking him to be quiet  She understood this was further good of her baby and did not ask for narcotics once in the visit  Ultimately, her significant other left the room very angry prior to the end of the visit  Chief Complaint    1  Hand Problem  boxers fracture right hand      History of Present Illness  HPI: [address] is a 20-year-old female presenting to the office today with a complaint of right hand fracture  She states that she slipped and fell on the ice and tried to break her fall on January 29, 2016   She was the ER was put in a splint  She states that she has significant amounts of pain  Of note, she is in her second trimester of pregnancy  She states that she has been taking the splint off as it makes it more uncomfortable for her  Review of Systems    Constitutional: No fever, no chills, feels well, no tiredness, no recent weight gain or loss  Eyes: No complaints of eyesight problems, no red eyes  ENT: no loss of hearing, no nosebleeds, no sore throat  Cardiovascular: No complaints of chest pain, no palpitations, no leg claudication or lower extremity edema  Respiratory: no compliants of shortness of breath, no wheezing, no cough  Gastrointestinal: no complaints of abdominal pain, no constipation, no nausea or diarrhea, no vomiting, no bloody stools  Genitourinary: no complaints of dysuria, no incontinence  Musculoskeletal: as noted in HPI  Integumentary: no complaints of skin rash or lesion, no itching or dry skin, no skin wounds  Neurological: no complaints of headache, no confusion, no numbness or tingling, no dizziness  Endocrine: No complaints of muscle weakness, no feelings of weakness, no frequent urination, no excessive thirst    Psychiatric: No suicidal thoughts, no anxiety, no feelings of depression  ROS reviewed  Past Medical History    The active problems and past medical history were reviewed and updated today  Surgical History    The surgical history was reviewed and updated today  Family History    The family history was reviewed and updated today  Social History    · Never a smoker  The social history was reviewed and updated today  The social history was reviewed and is unchanged  Current Meds   1  No Reported Medications Recorded    The medication list was reviewed and updated today  Allergies    1   No Known Drug Allergies    Vitals   Recorded: [id number] 03:08PM   Heart Rate 977   Systolic 058   Diastolic 86   Height 5 ft 4 in   Weight 168 lb    BMI Calculated 28 84   BSA Calculated 1 83     Physical Exam   Physical exam right hand: Skin is intact  There is ecchymosis over the dorsal aspect of the hand  Tender to palpation over the fifth metacarpal head  Sensation intact to light touch throughout  Patient is able to wiggle the fingers  Brisk capillary refill  Constitutional - General appearance: Normal    Musculoskeletal - Gait and station: Normal    Psychiatric - Orientation to person, place, and time: Normal  Mood and affect: Normal    Eyes   Conjunctiva and lids: Normal        Results/Data  I personally reviewed the films/images/results in the office today  My interpretation follows  X-ray Review 3 views right hand reveal a volarly angulated fracture of the head of the fifth metacarpal     Postreduction films reveal mild improvement of the head of the fifth metacarpal fracture, with still volar angulation  Procedure  Procedure: Cast application  of the right hand ulnar gutter metacarpal, forearm  Procedure: Injection of the base of small finger joint on the right   Indication: block  Risk, benefits, bleeding risk and infection risk were discussed with the patient  Verbal consent was obtained prior to the procedure  Preparation: alcohol was used to prep the area  ethyl chloride spray was used as a topical anesthetic  Procedure Note: A 25-gauge was used to inject 5 mL of 1% Lidocaine  A bandage was applied  Post-Procedure: the patient tolerated the procedure well  Complications: None  The base of the small finger was identified and prepped with alcohol  5 mL of lidocaine was injected with a 10 mL syringe and a 25-gauge needle for anesthesia purposes  After assuring that the patient was numb, and ulnar gutter cast was applied with reduction of the fifth metacarpal fracture during casting  The patient tolerated well  Postreduction films were obtained        Future Appointments    Date/Time Provider Specialty Site   03/08/2016 02:00 PM A[facility] [address]     Signatures   Electronically signed by : Colleen Peralta, [facility]; Feb 4 2016  5:03PM EST                       (Author)    Electronically signed by : ANALISA Garcia ; Feb 4 2016  5:11PM EST                       (Author)

## 2018-01-17 NOTE — MISCELLANEOUS
Future Appointments    Signatures   Electronically signed by : Ellis Conley, [facility]; Feb 4 2016  5:03PM EST                       (Author)    Electronically signed by : ANALISA Lombardo ; Feb 4 2016  5:11PM EST                       (Author)

## 2018-01-22 VITALS
SYSTOLIC BLOOD PRESSURE: 141 MMHG | WEIGHT: 189.5 LBS | DIASTOLIC BLOOD PRESSURE: 90 MMHG | HEIGHT: 64 IN | HEART RATE: 83 BPM | BODY MASS INDEX: 32.35 KG/M2

## 2018-01-22 VITALS
WEIGHT: 189 LBS | HEART RATE: 80 BPM | HEIGHT: 64 IN | BODY MASS INDEX: 32.27 KG/M2 | RESPIRATION RATE: 16 BRPM | DIASTOLIC BLOOD PRESSURE: 70 MMHG | SYSTOLIC BLOOD PRESSURE: 124 MMHG | TEMPERATURE: 98.8 F

## 2018-03-06 ENCOUNTER — OFFICE VISIT (OUTPATIENT)
Dept: FAMILY MEDICINE CLINIC | Facility: CLINIC | Age: 38
End: 2018-03-06
Payer: COMMERCIAL

## 2018-03-06 VITALS
RESPIRATION RATE: 14 BRPM | DIASTOLIC BLOOD PRESSURE: 80 MMHG | HEART RATE: 72 BPM | BODY MASS INDEX: 34.78 KG/M2 | SYSTOLIC BLOOD PRESSURE: 124 MMHG | HEIGHT: 62 IN | WEIGHT: 189 LBS | TEMPERATURE: 98.5 F

## 2018-03-06 DIAGNOSIS — F17.200 SMOKER: ICD-10-CM

## 2018-03-06 DIAGNOSIS — E66.09 CLASS 1 OBESITY DUE TO EXCESS CALORIES WITHOUT SERIOUS COMORBIDITY WITH BODY MASS INDEX (BMI) OF 34.0 TO 34.9 IN ADULT: ICD-10-CM

## 2018-03-06 DIAGNOSIS — Z32.01 POSITIVE PREGNANCY TEST: Primary | ICD-10-CM

## 2018-03-06 PROBLEM — E66.9 OBESITY: Status: ACTIVE | Noted: 2018-03-06

## 2018-03-06 PROCEDURE — 99213 OFFICE O/P EST LOW 20 MIN: CPT | Performed by: FAMILY MEDICINE

## 2018-03-06 NOTE — ASSESSMENT & PLAN NOTE
Positive pregnancy test 3 weeks ago  Unsure about last menstrual period because she had a miscarriage in October 2017 and since then she has not had any period  will sent for quantitative beta HCG end Ob ultrasound to establish dates   prenatal labs given   will schedule appointment for initial intake once pregnancy confirmed

## 2018-03-06 NOTE — PROGRESS NOTES
Assessment/Plan:    Problem List Items Addressed This Visit        Other    Positive pregnancy test - Primary      Positive pregnancy test 3 weeks ago  Unsure about last menstrual period because she had a miscarriage in 2017 and since then she has not had any period  will sent for quantitative beta HCG end Ob ultrasound to establish dates   prenatal labs given   will schedule appointment for initial intake once pregnancy confirmed  Relevant Orders    Prenatal Panel    hCG, quantitative    US OB pregnancy limited with transvaginal    Smoker       Strongly advised to quit smoking given the positive pregnancy test  She does not want any help at this point  Obesity      Diet and exercise discussed               Subjective:      Patient ID: Miladis Peterson is a 40 y o  female  40year old  with medical history of endometriosis and obesity presented to the office with positive pregnancy test 3 weeks ago  Patient had a miscarriage in 2017  Since then she has not had period so she is unsure when her last menstrual cycle was  This pregnancy is unplanned  Patient had preeclampsia with her 2nd child  Her both deliveries were vaginal    Patient smokes 7 cigarettes a day  She is trying to cut down since she found out that she was pregnant  She denies any alcohol, illicit drugs  She is complaining of some nausea  She denies vomiting,  Vaginal bleeding,   Abdominal cramps  Pregnancy Test   This is a new problem  Associated symptoms include nausea  Pertinent negatives include no abdominal pain, anorexia, arthralgias, change in bowel habit, chest pain, chills, congestion, coughing, diaphoresis, fatigue, fever, headaches, joint swelling, myalgias, neck pain, numbness, rash, sore throat, swollen glands, urinary symptoms, vertigo, visual change, vomiting or weakness         The following portions of the patient's history were reviewed and updated as appropriate: allergies, current medications, past family history, past medical history, past social history, past surgical history and problem list     Review of Systems   Constitutional: Negative for chills, diaphoresis, fatigue and fever  HENT: Negative for congestion and sore throat  Respiratory: Negative for cough  Cardiovascular: Negative for chest pain  Gastrointestinal: Positive for nausea  Negative for abdominal pain, anorexia, change in bowel habit and vomiting  Genitourinary: Negative for decreased urine volume, difficulty urinating, dysuria, enuresis, frequency, pelvic pain, vaginal bleeding, vaginal discharge and vaginal pain  Musculoskeletal: Negative for arthralgias, joint swelling, myalgias and neck pain  Skin: Negative for rash  Neurological: Negative for dizziness, vertigo, weakness, numbness and headaches  Psychiatric/Behavioral: Negative for agitation and behavioral problems  Objective:    Vitals:    03/06/18 1318   BP: 124/80   Pulse: 72   Resp: 14   Temp: 98 5 °F (36 9 °C)        Physical Exam   Constitutional: She is oriented to person, place, and time  She appears well-developed and well-nourished  No distress  HENT:   Head: Normocephalic  Right Ear: External ear normal    Left Ear: External ear normal    Mouth/Throat: Oropharynx is clear and moist  No oropharyngeal exudate  Eyes: Conjunctivae are normal  Pupils are equal, round, and reactive to light  Neck: Normal range of motion  Cardiovascular: Normal rate, regular rhythm, normal heart sounds and intact distal pulses  Exam reveals no gallop and no friction rub  No murmur heard  Pulmonary/Chest: Effort normal and breath sounds normal  No respiratory distress  She has no wheezes  She has no rales  She exhibits no tenderness  Abdominal: Soft  She exhibits no distension and no mass  There is no tenderness  There is no rebound and no guarding  Musculoskeletal: Normal range of motion   She exhibits no edema, tenderness or deformity  Lymphadenopathy:     She has no cervical adenopathy  Neurological: She is alert and oriented to person, place, and time  Skin: Skin is warm and dry  No rash noted  She is not diaphoretic  No pallor  Psychiatric: She has a normal mood and affect  Her behavior is normal    Vitals reviewed

## 2018-03-06 NOTE — ASSESSMENT & PLAN NOTE
Strongly advised to quit smoking given the positive pregnancy test  She does not want any help at this point

## 2018-03-13 ENCOUNTER — HOSPITAL ENCOUNTER (OUTPATIENT)
Dept: RADIOLOGY | Facility: MEDICAL CENTER | Age: 38
Discharge: HOME | End: 2018-03-13

## 2018-03-13 ENCOUNTER — TELEPHONE (OUTPATIENT)
Dept: FAMILY MEDICINE CLINIC | Facility: CLINIC | Age: 38
End: 2018-03-13

## 2018-03-13 DIAGNOSIS — Z32.01 POSITIVE PREGNANCY TEST: ICD-10-CM

## 2018-03-13 DIAGNOSIS — Z34.90 PREGNANCY, UNSPECIFIED GESTATIONAL AGE: Primary | ICD-10-CM

## 2018-03-13 NOTE — TELEPHONE ENCOUNTER
Consuelo [employer] from [employer]  called and said she cant do US on pt that was ordered by Dr Segundo Parks cause she is in her 2nd trimester, pt needs a  US order written up  , Ami Shaquille is canceling the appt  That was scheduled for today  Any questions her ext is [phone]

## 2018-04-04 ENCOUNTER — ULTRASOUND (OUTPATIENT)
Dept: PERINATAL CARE | Facility: CLINIC | Age: 38
End: 2018-04-04
Payer: COMMERCIAL

## 2018-04-04 VITALS
DIASTOLIC BLOOD PRESSURE: 85 MMHG | HEART RATE: 111 BPM | BODY MASS INDEX: 31.73 KG/M2 | WEIGHT: 172.4 LBS | SYSTOLIC BLOOD PRESSURE: 117 MMHG | HEIGHT: 62 IN

## 2018-04-04 DIAGNOSIS — Z34.90 PREGNANCY, UNSPECIFIED GESTATIONAL AGE: ICD-10-CM

## 2018-04-04 DIAGNOSIS — Z36.86 ENCOUNTER FOR ANTENATAL SCREENING FOR CERVICAL LENGTH: ICD-10-CM

## 2018-04-04 DIAGNOSIS — Z3A.21 21 WEEKS GESTATION OF PREGNANCY: ICD-10-CM

## 2018-04-04 DIAGNOSIS — O09.522 ELDERLY MULTIGRAVIDA IN SECOND TRIMESTER: Primary | ICD-10-CM

## 2018-04-04 DIAGNOSIS — O99.212 OBESITY AFFECTING PREGNANCY IN SECOND TRIMESTER: ICD-10-CM

## 2018-04-04 PROBLEM — Z32.01 POSITIVE PREGNANCY TEST: Status: RESOLVED | Noted: 2018-03-06 | Resolved: 2018-04-04

## 2018-04-04 PROCEDURE — 76811 OB US DETAILED SNGL FETUS: CPT | Performed by: OBSTETRICS & GYNECOLOGY

## 2018-04-04 PROCEDURE — 76817 TRANSVAGINAL US OBSTETRIC: CPT | Performed by: OBSTETRICS & GYNECOLOGY

## 2018-04-04 PROCEDURE — 99241 PR OFFICE CONSULTATION NEW/ESTAB PATIENT 15 MIN: CPT | Performed by: OBSTETRICS & GYNECOLOGY

## 2018-04-04 NOTE — PROGRESS NOTES
Please refer to the [facility] ultrasound report in Ob Procedures for additional information regarding the visit to the [facility]  today

## 2018-05-04 ENCOUNTER — ROUTINE PRENATAL (OUTPATIENT)
Dept: FAMILY MEDICINE CLINIC | Facility: CLINIC | Age: 38
End: 2018-05-04
Payer: COMMERCIAL

## 2018-05-04 VITALS — WEIGHT: 190.6 LBS | BODY MASS INDEX: 34.86 KG/M2 | DIASTOLIC BLOOD PRESSURE: 80 MMHG | SYSTOLIC BLOOD PRESSURE: 130 MMHG

## 2018-05-04 DIAGNOSIS — Z3A.26 26 WEEKS GESTATION OF PREGNANCY: Primary | ICD-10-CM

## 2018-05-04 LAB
BACTERIA UR QL AUTO: ABNORMAL /HPF
BILIRUB UR QL STRIP: ABNORMAL
CLARITY UR: CLEAR
COLOR UR: ABNORMAL
GLUCOSE UR STRIP-MCNC: NEGATIVE MG/DL
HGB UR QL STRIP.AUTO: ABNORMAL
HYALINE CASTS #/AREA URNS LPF: ABNORMAL /LPF
KETONES UR STRIP-MCNC: NEGATIVE MG/DL
LEUKOCYTE ESTERASE UR QL STRIP: ABNORMAL
NITRITE UR QL STRIP: NEGATIVE
NON-SQ EPI CELLS URNS QL MICRO: ABNORMAL /HPF
PH UR STRIP.AUTO: 6 [PH] (ref 4.5–8)
PROT UR STRIP-MCNC: ABNORMAL MG/DL
RBC #/AREA URNS AUTO: ABNORMAL /HPF
SL AMB  POCT GLUCOSE, UA: NEGATIVE
SL AMB LEUKOCYTE ESTERASE,UA: ABNORMAL
SL AMB POCT BILIRUBIN,UA: ABNORMAL
SL AMB POCT BLOOD,UA: ABNORMAL
SL AMB POCT CLARITY,UA: ABNORMAL
SL AMB POCT COLOR,UA: ABNORMAL
SL AMB POCT KETONES,UA: NEGATIVE
SL AMB POCT NITRITE,UA: NEGATIVE
SL AMB POCT PH,UA: 6
SL AMB POCT SPECIFIC GRAVITY,UA: 1.02
SL AMB POCT URINE PROTEIN: 300
SL AMB POCT UROBILINOGEN: 0.2
SP GR UR STRIP.AUTO: 1.02 (ref 1–1.03)
UROBILINOGEN UR QL STRIP.AUTO: 1 E.U./DL
WBC #/AREA URNS AUTO: ABNORMAL /HPF

## 2018-05-04 PROCEDURE — 81002 URINALYSIS NONAUTO W/O SCOPE: CPT | Performed by: FAMILY MEDICINE

## 2018-05-04 PROCEDURE — 81001 URINALYSIS AUTO W/SCOPE: CPT | Performed by: FAMILY MEDICINE

## 2018-05-04 PROCEDURE — 99213 OFFICE O/P EST LOW 20 MIN: CPT | Performed by: FAMILY MEDICINE

## 2018-05-04 NOTE — PROGRESS NOTES
Patient here for prenatal visit  Prenatal  Labs not done  Patient denies discharge,  Bleeding,  Contractions  She feels her baby moving  will order prenatal labs,  Glucola,  urine culture     UA: 300 protein  Will do pre- eclampsia work up   follow-up in 4 weeks

## 2018-05-18 ENCOUNTER — ULTRASOUND (OUTPATIENT)
Dept: PERINATAL CARE | Facility: MEDICAL CENTER | Age: 38
End: 2018-05-18
Payer: COMMERCIAL

## 2018-05-18 VITALS
BODY MASS INDEX: 35.63 KG/M2 | HEART RATE: 123 BPM | HEIGHT: 62 IN | SYSTOLIC BLOOD PRESSURE: 126 MMHG | WEIGHT: 193.6 LBS | DIASTOLIC BLOOD PRESSURE: 81 MMHG

## 2018-05-18 DIAGNOSIS — IMO0002 EVALUATE ANATOMY NOT SEEN ON PRIOR SONOGRAM: ICD-10-CM

## 2018-05-18 DIAGNOSIS — Z36.89 ENCOUNTER FOR ULTRASOUND TO CHECK FETAL GROWTH: ICD-10-CM

## 2018-05-18 DIAGNOSIS — O99.213 OBESITY AFFECTING PREGNANCY IN THIRD TRIMESTER: ICD-10-CM

## 2018-05-18 DIAGNOSIS — O99.333 TOBACCO SMOKING AFFECTING PREGNANCY IN THIRD TRIMESTER: ICD-10-CM

## 2018-05-18 DIAGNOSIS — O40.3XX0 POLYHYDRAMNIOS IN THIRD TRIMESTER COMPLICATION, SINGLE OR UNSPECIFIED FETUS: Primary | ICD-10-CM

## 2018-05-18 PROCEDURE — 76816 OB US FOLLOW-UP PER FETUS: CPT | Performed by: OBSTETRICS & GYNECOLOGY

## 2018-05-18 PROCEDURE — 76819 FETAL BIOPHYS PROFIL W/O NST: CPT | Performed by: OBSTETRICS & GYNECOLOGY

## 2018-05-18 PROCEDURE — 99212 OFFICE O/P EST SF 10 MIN: CPT | Performed by: OBSTETRICS & GYNECOLOGY

## 2018-05-18 NOTE — PATIENT INSTRUCTIONS
Thank you for choosing [facility] for your  care today  If you have any questions about your ultrasound or care, please do not hesitate to contact us or your primary obstetrician  Please continue to try to cut down on tobacco use as this will help you and baby  and Please try to keep your weight gain to 11-20 pounds this pregnancy; this is best accomplished by regular aerobic exercise and healthy eating  Please return in 2 weeks  for your next ultrasound to check on the CASEY and 4 weeks for growth  Future Appointments  Date Time Provider Woo Hernandez   2018 1:40 PM Josiah Dallas MD [facility] [employer]     Please get your diabetes testing done

## 2018-05-18 NOTE — PROGRESS NOTES
[address] Kalyan: Ms Raghu Hooks was seen today at 28w1d for fetal growth and followup missed anatomy ultrasound  See ultrasound report under "OB Procedures" tab  Please don't hesitate to contact our office with any concerns or questions    Marylen Haws, MD

## 2018-06-26 ENCOUNTER — ROUTINE PRENATAL (OUTPATIENT)
Dept: FAMILY MEDICINE CLINIC | Facility: CLINIC | Age: 38
End: 2018-06-26
Payer: COMMERCIAL

## 2018-06-26 ENCOUNTER — APPOINTMENT (OUTPATIENT)
Dept: LAB | Facility: MEDICAL CENTER | Age: 38
End: 2018-06-26
Payer: COMMERCIAL

## 2018-06-26 VITALS — DIASTOLIC BLOOD PRESSURE: 82 MMHG | WEIGHT: 193.8 LBS | BODY MASS INDEX: 35.45 KG/M2 | SYSTOLIC BLOOD PRESSURE: 130 MMHG

## 2018-06-26 DIAGNOSIS — O40.3XX0 POLYHYDRAMNIOS IN THIRD TRIMESTER COMPLICATION, SINGLE OR UNSPECIFIED FETUS: ICD-10-CM

## 2018-06-26 DIAGNOSIS — Z3A.24 24 WEEKS GESTATION OF PREGNANCY: Primary | ICD-10-CM

## 2018-06-26 DIAGNOSIS — F17.200 SMOKER: ICD-10-CM

## 2018-06-26 DIAGNOSIS — Z3A.26 26 WEEKS GESTATION OF PREGNANCY: ICD-10-CM

## 2018-06-26 DIAGNOSIS — E66.09 CLASS 1 OBESITY DUE TO EXCESS CALORIES WITHOUT SERIOUS COMORBIDITY WITH BODY MASS INDEX (BMI) OF 34.0 TO 34.9 IN ADULT: ICD-10-CM

## 2018-06-26 LAB
ABO GROUP BLD: NORMAL
BACTERIA UR QL AUTO: NORMAL /HPF
BASOPHILS # BLD AUTO: 0.01 THOUSANDS/ΜL (ref 0–0.1)
BASOPHILS NFR BLD AUTO: 0 % (ref 0–1)
BILIRUB UR QL STRIP: NEGATIVE
BLD GP AB SCN SERPL QL: NEGATIVE
CLARITY UR: CLEAR
COLOR UR: YELLOW
CREAT UR-MCNC: 101 MG/DL
EOSINOPHIL # BLD AUTO: 0.16 THOUSAND/ΜL (ref 0–0.61)
EOSINOPHIL NFR BLD AUTO: 1 % (ref 0–6)
ERYTHROCYTE [DISTWIDTH] IN BLOOD BY AUTOMATED COUNT: 17.3 % (ref 11.6–15.1)
GLUCOSE 1H P 50 G GLC PO SERPL-MCNC: 132 MG/DL
GLUCOSE UR STRIP-MCNC: NEGATIVE MG/DL
HCT VFR BLD AUTO: 24.8 % (ref 34.8–46.1)
HGB BLD-MCNC: 8.5 G/DL (ref 11.5–15.4)
HGB UR QL STRIP.AUTO: ABNORMAL
HYALINE CASTS #/AREA URNS LPF: NORMAL /LPF
IMM GRANULOCYTES # BLD AUTO: 0.15 THOUSAND/UL (ref 0–0.2)
IMM GRANULOCYTES NFR BLD AUTO: 1 % (ref 0–2)
KETONES UR STRIP-MCNC: NEGATIVE MG/DL
LEUKOCYTE ESTERASE UR QL STRIP: NEGATIVE
LYMPHOCYTES # BLD AUTO: 1.65 THOUSANDS/ΜL (ref 0.6–4.47)
LYMPHOCYTES NFR BLD AUTO: 14 % (ref 14–44)
MCH RBC QN AUTO: 35.4 PG (ref 26.8–34.3)
MCHC RBC AUTO-ENTMCNC: 34.3 G/DL (ref 31.4–37.4)
MCV RBC AUTO: 103 FL (ref 82–98)
MICROALBUMIN UR-MCNC: 156 MG/L (ref 0–20)
MICROALBUMIN/CREAT 24H UR: 154 MG/G CREATININE (ref 0–30)
MONOCYTES # BLD AUTO: 0.48 THOUSAND/ΜL (ref 0.17–1.22)
MONOCYTES NFR BLD AUTO: 4 % (ref 4–12)
NEUTROPHILS # BLD AUTO: 9.46 THOUSANDS/ΜL (ref 1.85–7.62)
NEUTS SEG NFR BLD AUTO: 80 % (ref 43–75)
NITRITE UR QL STRIP: NEGATIVE
NON-SQ EPI CELLS URNS QL MICRO: NORMAL /HPF
NRBC BLD AUTO-RTO: 0 /100 WBCS
PH UR STRIP.AUTO: 6.5 [PH] (ref 4.5–8)
PLATELET # BLD AUTO: 247 THOUSANDS/UL (ref 149–390)
PMV BLD AUTO: 10 FL (ref 8.9–12.7)
PROT UR STRIP-MCNC: NEGATIVE MG/DL
RBC # BLD AUTO: 2.4 MILLION/UL (ref 3.81–5.12)
RBC #/AREA URNS AUTO: NORMAL /HPF
RH BLD: POSITIVE
RUBV IGG SERPL IA-ACNC: >175 IU/ML
SP GR UR STRIP.AUTO: 1.01 (ref 1–1.03)
SPECIMEN EXPIRATION DATE: NORMAL
UROBILINOGEN UR QL STRIP.AUTO: 0.2 E.U./DL
WBC # BLD AUTO: 11.91 THOUSAND/UL (ref 4.31–10.16)
WBC #/AREA URNS AUTO: NORMAL /HPF

## 2018-06-26 PROCEDURE — 99214 OFFICE O/P EST MOD 30 MIN: CPT | Performed by: FAMILY MEDICINE

## 2018-06-26 PROCEDURE — 82950 GLUCOSE TEST: CPT

## 2018-06-26 PROCEDURE — 82043 UR ALBUMIN QUANTITATIVE: CPT | Performed by: FAMILY MEDICINE

## 2018-06-26 PROCEDURE — 82570 ASSAY OF URINE CREATININE: CPT | Performed by: FAMILY MEDICINE

## 2018-06-26 PROCEDURE — 87086 URINE CULTURE/COLONY COUNT: CPT

## 2018-06-26 PROCEDURE — 80081 OBSTETRIC PANEL INC HIV TSTG: CPT

## 2018-06-26 PROCEDURE — 81001 URINALYSIS AUTO W/SCOPE: CPT

## 2018-06-26 PROCEDURE — 36415 COLL VENOUS BLD VENIPUNCTURE: CPT

## 2018-06-26 NOTE — PROGRESS NOTES
Occasional contractions  No discharge or bleeding  Feels baby moving   missed appointment with  center  Polyhydramnios on ultrasound  Will scheduled today  Did   labs and Glucola today  albumin creatinine ratio collected in  the office  Today  follow-up in 2 weeks   GBS in 2 weeks   advised to stop smoking

## 2018-06-27 LAB
BACTERIA UR CULT: NORMAL
HBV SURFACE AG SER QL: NORMAL
HIV 1+2 AB+HIV1 P24 AG SERPL QL IA: NORMAL
RPR SER QL: NORMAL

## 2018-07-03 ENCOUNTER — ANESTHESIA EVENT (INPATIENT)
Dept: LABOR AND DELIVERY | Facility: HOSPITAL | Age: 38
DRG: 540 | End: 2018-07-03
Payer: COMMERCIAL

## 2018-07-03 ENCOUNTER — HOSPITAL ENCOUNTER (INPATIENT)
Facility: HOSPITAL | Age: 38
LOS: 4 days | Discharge: HOME | DRG: 540 | End: 2018-07-07
Attending: FAMILY MEDICINE | Admitting: FAMILY MEDICINE
Payer: COMMERCIAL

## 2018-07-03 DIAGNOSIS — Z98.891 S/P EMERGENCY C-SECTION: ICD-10-CM

## 2018-07-03 DIAGNOSIS — D64.9 ANEMIA: Primary | ICD-10-CM

## 2018-07-03 PROBLEM — Z3A.34 34 WEEKS GESTATION OF PREGNANCY: Status: ACTIVE | Noted: 2018-07-03

## 2018-07-03 PROBLEM — Z3A.34 34 WEEKS GESTATION OF PREGNANCY: Status: RESOLVED | Noted: 2018-07-03 | Resolved: 2018-07-03

## 2018-07-03 LAB
ABO GROUP BLD: NORMAL
ALBUMIN SERPL BCP-MCNC: 2.7 G/DL (ref 3.5–5)
ALP SERPL-CCNC: 93 U/L (ref 46–116)
ALT SERPL W P-5'-P-CCNC: 9 U/L (ref 12–78)
ANION GAP SERPL CALCULATED.3IONS-SCNC: 10 MMOL/L (ref 4–13)
AST SERPL W P-5'-P-CCNC: 8 U/L (ref 5–45)
BASE EXCESS BLDCOV CALC-SCNC: -6 MMOL/L (ref 1–9)
BASOPHILS # BLD AUTO: 0.02 THOUSANDS/ΜL (ref 0–0.1)
BASOPHILS NFR BLD AUTO: 0 % (ref 0–1)
BILIRUB SERPL-MCNC: 0.63 MG/DL (ref 0.2–1)
BLD GP AB SCN SERPL QL: NEGATIVE
BUN SERPL-MCNC: 6 MG/DL (ref 5–25)
CALCIUM SERPL-MCNC: 8.8 MG/DL (ref 8.3–10.1)
CHLORIDE SERPL-SCNC: 106 MMOL/L (ref 100–108)
CO2 SERPL-SCNC: 21 MMOL/L (ref 21–32)
CREAT SERPL-MCNC: 0.38 MG/DL (ref 0.6–1.3)
CREAT UR-MCNC: 64.5 MG/DL
EOSINOPHIL # BLD AUTO: 0.15 THOUSAND/ΜL (ref 0–0.61)
EOSINOPHIL NFR BLD AUTO: 1 % (ref 0–6)
ERYTHROCYTE [DISTWIDTH] IN BLOOD BY AUTOMATED COUNT: 16.8 % (ref 11.6–15.1)
GFR SERPL CREATININE-BSD FRML MDRD: 137 ML/MIN/1.73SQ M
GLUCOSE SERPL-MCNC: 78 MG/DL (ref 65–140)
HCO3 BLDCOV-SCNC: 21.1 MMOL/L (ref 12.2–28.6)
HCT VFR BLD AUTO: 24.4 % (ref 34.8–46.1)
HGB BLD-MCNC: 8.4 G/DL (ref 11.5–15.4)
IMM GRANULOCYTES # BLD AUTO: 0.12 THOUSAND/UL (ref 0–0.2)
IMM GRANULOCYTES NFR BLD AUTO: 1 % (ref 0–2)
LYMPHOCYTES # BLD AUTO: 2.17 THOUSANDS/ΜL (ref 0.6–4.47)
LYMPHOCYTES NFR BLD AUTO: 15 % (ref 14–44)
MCH RBC QN AUTO: 34.7 PG (ref 26.8–34.3)
MCHC RBC AUTO-ENTMCNC: 34.4 G/DL (ref 31.4–37.4)
MCV RBC AUTO: 101 FL (ref 82–98)
MICROALBUMIN UR-MCNC: 126 MG/L (ref 0–20)
MICROALBUMIN/CREAT 24H UR: 195 MG/G CREATININE (ref 0–30)
MONOCYTES # BLD AUTO: 0.81 THOUSAND/ΜL (ref 0.17–1.22)
MONOCYTES NFR BLD AUTO: 6 % (ref 4–12)
NEUTROPHILS # BLD AUTO: 11.49 THOUSANDS/ΜL (ref 1.85–7.62)
NEUTS SEG NFR BLD AUTO: 77 % (ref 43–75)
NRBC BLD AUTO-RTO: 0 /100 WBCS
OXYHGB MFR BLDCOV: 67.5 %
PCO2 BLDCOV: 46.9 MM HG (ref 27–43)
PH BLDCOV: 7.27 [PH] (ref 7.19–7.49)
PLATELET # BLD AUTO: 249 THOUSANDS/UL (ref 149–390)
PMV BLD AUTO: 10 FL (ref 8.9–12.7)
PO2 BLDCOV: 29.7 MM HG (ref 15–45)
POTASSIUM SERPL-SCNC: 3.4 MMOL/L (ref 3.5–5.3)
PROT SERPL-MCNC: 6.8 G/DL (ref 6.4–8.2)
RBC # BLD AUTO: 2.42 MILLION/UL (ref 3.81–5.12)
RH BLD: POSITIVE
SAO2 % BLDCOV: 16.6 ML/DL
SODIUM SERPL-SCNC: 137 MMOL/L (ref 136–145)
SPECIMEN EXPIRATION DATE: NORMAL
URATE SERPL-MCNC: 5 MG/DL (ref 2–6.8)
WBC # BLD AUTO: 14.76 THOUSAND/UL (ref 4.31–10.16)

## 2018-07-03 PROCEDURE — 86901 BLOOD TYPING SEROLOGIC RH(D): CPT | Performed by: FAMILY MEDICINE

## 2018-07-03 PROCEDURE — 80053 COMPREHEN METABOLIC PANEL: CPT | Performed by: FAMILY MEDICINE

## 2018-07-03 PROCEDURE — 86592 SYPHILIS TEST NON-TREP QUAL: CPT | Performed by: FAMILY MEDICINE

## 2018-07-03 PROCEDURE — 59514 CESAREAN DELIVERY ONLY: CPT | Performed by: OBSTETRICS & GYNECOLOGY

## 2018-07-03 PROCEDURE — 86850 RBC ANTIBODY SCREEN: CPT | Performed by: FAMILY MEDICINE

## 2018-07-03 PROCEDURE — 84550 ASSAY OF BLOOD/URIC ACID: CPT | Performed by: FAMILY MEDICINE

## 2018-07-03 PROCEDURE — 86900 BLOOD TYPING SEROLOGIC ABO: CPT | Performed by: FAMILY MEDICINE

## 2018-07-03 PROCEDURE — 99231 SBSQ HOSP IP/OBS SF/LOW 25: CPT | Performed by: FAMILY MEDICINE

## 2018-07-03 PROCEDURE — 4A1HX4Z MONITORING OF PRODUCTS OF CONCEPTION, CARDIAC ELECTRICAL ACTIVITY, EXTERNAL APPROACH: ICD-10-PCS | Performed by: OBSTETRICS & GYNECOLOGY

## 2018-07-03 PROCEDURE — 82043 UR ALBUMIN QUANTITATIVE: CPT | Performed by: FAMILY MEDICINE

## 2018-07-03 PROCEDURE — 88307 TISSUE EXAM BY PATHOLOGIST: CPT | Performed by: PATHOLOGY

## 2018-07-03 PROCEDURE — 82570 ASSAY OF URINE CREATININE: CPT | Performed by: FAMILY MEDICINE

## 2018-07-03 PROCEDURE — 85025 COMPLETE CBC W/AUTO DIFF WBC: CPT | Performed by: FAMILY MEDICINE

## 2018-07-03 PROCEDURE — 82805 BLOOD GASES W/O2 SATURATION: CPT | Performed by: OBSTETRICS & GYNECOLOGY

## 2018-07-03 PROCEDURE — 86923 COMPATIBILITY TEST ELECTRIC: CPT

## 2018-07-03 RX ORDER — BETAMETHASONE SODIUM PHOSPHATE AND BETAMETHASONE ACETATE 3; 3 MG/ML; MG/ML
12 INJECTION, SUSPENSION INTRA-ARTICULAR; INTRALESIONAL; INTRAMUSCULAR; SOFT TISSUE EVERY 24 HOURS
Status: DISCONTINUED | OUTPATIENT
Start: 2018-07-03 | End: 2018-07-04

## 2018-07-03 RX ORDER — CEFAZOLIN SODIUM 1 G/3ML
INJECTION, POWDER, FOR SOLUTION INTRAMUSCULAR; INTRAVENOUS AS NEEDED
Status: DISCONTINUED | OUTPATIENT
Start: 2018-07-03 | End: 2018-07-03 | Stop reason: SURG

## 2018-07-03 RX ORDER — BETAMETHASONE SODIUM PHOSPHATE AND BETAMETHASONE ACETATE 3; 3 MG/ML; MG/ML
12 INJECTION, SUSPENSION INTRA-ARTICULAR; INTRALESIONAL; INTRAMUSCULAR; SOFT TISSUE ONCE
Status: DISCONTINUED | OUTPATIENT
Start: 2018-07-03 | End: 2018-07-03

## 2018-07-03 RX ORDER — SUCCINYLCHOLINE/SOD CL,ISO/PF 100 MG/5ML
SYRINGE (ML) INTRAVENOUS AS NEEDED
Status: DISCONTINUED | OUTPATIENT
Start: 2018-07-03 | End: 2018-07-03 | Stop reason: SURG

## 2018-07-03 RX ORDER — OXYTOCIN/RINGER'S LACTATE 30/500 ML
PLASTIC BAG, INJECTION (ML) INTRAVENOUS AS NEEDED
Status: DISCONTINUED | OUTPATIENT
Start: 2018-07-03 | End: 2018-07-03 | Stop reason: SURG

## 2018-07-03 RX ORDER — PROPOFOL 10 MG/ML
INJECTION, EMULSION INTRAVENOUS AS NEEDED
Status: DISCONTINUED | OUTPATIENT
Start: 2018-07-03 | End: 2018-07-03 | Stop reason: SURG

## 2018-07-03 RX ORDER — SODIUM CHLORIDE, SODIUM LACTATE, POTASSIUM CHLORIDE, CALCIUM CHLORIDE 600; 310; 30; 20 MG/100ML; MG/100ML; MG/100ML; MG/100ML
125 INJECTION, SOLUTION INTRAVENOUS CONTINUOUS
Status: DISCONTINUED | OUTPATIENT
Start: 2018-07-03 | End: 2018-07-04

## 2018-07-03 RX ORDER — OXYTOCIN/RINGER'S LACTATE 30/500 ML
PLASTIC BAG, INJECTION (ML) INTRAVENOUS
Status: COMPLETED
Start: 2018-07-03 | End: 2018-07-03

## 2018-07-03 RX ORDER — ONDANSETRON 2 MG/ML
INJECTION INTRAMUSCULAR; INTRAVENOUS AS NEEDED
Status: DISCONTINUED | OUTPATIENT
Start: 2018-07-03 | End: 2018-07-03 | Stop reason: SURG

## 2018-07-03 RX ADMIN — HYDROMORPHONE HYDROCHLORIDE 1 MG: 1 INJECTION, SOLUTION INTRAMUSCULAR; INTRAVENOUS; SUBCUTANEOUS at 23:02

## 2018-07-03 RX ADMIN — DEXAMETHASONE SODIUM PHOSPHATE 8 MG: 10 INJECTION INTRAMUSCULAR; INTRAVENOUS at 23:00

## 2018-07-03 RX ADMIN — SODIUM CHLORIDE, SODIUM LACTATE, POTASSIUM CHLORIDE, AND CALCIUM CHLORIDE: .6; .31; .03; .02 INJECTION, SOLUTION INTRAVENOUS at 22:49

## 2018-07-03 RX ADMIN — ONDANSETRON 4 MG: 2 INJECTION INTRAMUSCULAR; INTRAVENOUS at 23:02

## 2018-07-03 RX ADMIN — CEFAZOLIN 2000 MG: 1 INJECTION, POWDER, FOR SOLUTION INTRAVENOUS at 22:57

## 2018-07-03 RX ADMIN — Medication 100 MG: at 22:55

## 2018-07-03 RX ADMIN — HYDROMORPHONE HYDROCHLORIDE 1 MG: 1 INJECTION, SOLUTION INTRAMUSCULAR; INTRAVENOUS; SUBCUTANEOUS at 22:59

## 2018-07-03 RX ADMIN — FENTANYL CITRATE 100 MCG: 50 INJECTION, SOLUTION INTRAMUSCULAR; INTRAVENOUS at 23:55

## 2018-07-03 RX ADMIN — Medication 250 UNITS: at 23:00

## 2018-07-03 RX ADMIN — PROPOFOL 200 MG: 10 INJECTION, EMULSION INTRAVENOUS at 22:55

## 2018-07-03 RX ADMIN — SODIUM CHLORIDE, SODIUM LACTATE, POTASSIUM CHLORIDE, AND CALCIUM CHLORIDE 1000 ML: .6; .31; .03; .02 INJECTION, SOLUTION INTRAVENOUS at 21:50

## 2018-07-04 LAB
BASOPHILS # BLD AUTO: 0.02 THOUSANDS/ΜL (ref 0–0.1)
BASOPHILS NFR BLD AUTO: 0 % (ref 0–1)
EOSINOPHIL # BLD AUTO: 0.03 THOUSAND/ΜL (ref 0–0.61)
EOSINOPHIL NFR BLD AUTO: 0 % (ref 0–6)
ERYTHROCYTE [DISTWIDTH] IN BLOOD BY AUTOMATED COUNT: 17 % (ref 11.6–15.1)
HCT VFR BLD AUTO: 21.4 % (ref 34.8–46.1)
HGB BLD-MCNC: 7.6 G/DL (ref 11.5–15.4)
IMM GRANULOCYTES # BLD AUTO: 0.04 THOUSAND/UL (ref 0–0.2)
IMM GRANULOCYTES NFR BLD AUTO: 1 % (ref 0–2)
LYMPHOCYTES # BLD AUTO: 1.71 THOUSANDS/ΜL (ref 0.6–4.47)
LYMPHOCYTES NFR BLD AUTO: 20 % (ref 14–44)
MCH RBC QN AUTO: 36 PG (ref 26.8–34.3)
MCHC RBC AUTO-ENTMCNC: 35.5 G/DL (ref 31.4–37.4)
MCV RBC AUTO: 101 FL (ref 82–98)
MONOCYTES # BLD AUTO: 0.65 THOUSAND/ΜL (ref 0.17–1.22)
MONOCYTES NFR BLD AUTO: 8 % (ref 4–12)
NEUTROPHILS # BLD AUTO: 5.99 THOUSANDS/ΜL (ref 1.85–7.62)
NEUTS SEG NFR BLD AUTO: 71 % (ref 43–75)
PLATELET # BLD AUTO: 236 THOUSANDS/UL (ref 149–390)
PMV BLD AUTO: 10.3 FL (ref 8.9–12.7)
RBC # BLD AUTO: 2.11 MILLION/UL (ref 3.81–5.12)
RPR SER QL: NORMAL
WBC # BLD AUTO: 24.3 THOUSAND/UL (ref 4.31–10.16)

## 2018-07-04 PROCEDURE — 85025 COMPLETE CBC W/AUTO DIFF WBC: CPT | Performed by: FAMILY MEDICINE

## 2018-07-04 PROCEDURE — 94762 N-INVAS EAR/PLS OXIMTRY CONT: CPT

## 2018-07-04 PROCEDURE — 94760 N-INVAS EAR/PLS OXIMETRY 1: CPT

## 2018-07-04 PROCEDURE — 99231 SBSQ HOSP IP/OBS SF/LOW 25: CPT | Performed by: FAMILY MEDICINE

## 2018-07-04 RX ORDER — METOCLOPRAMIDE HYDROCHLORIDE 5 MG/ML
10 INJECTION INTRAMUSCULAR; INTRAVENOUS ONCE AS NEEDED
Status: DISCONTINUED | OUTPATIENT
Start: 2018-07-04 | End: 2018-07-07 | Stop reason: HOSPADM

## 2018-07-04 RX ORDER — ONDANSETRON 2 MG/ML
4 INJECTION INTRAMUSCULAR; INTRAVENOUS EVERY 8 HOURS PRN
Status: DISCONTINUED | OUTPATIENT
Start: 2018-07-04 | End: 2018-07-07 | Stop reason: HOSPADM

## 2018-07-04 RX ORDER — DOCUSATE SODIUM 100 MG/1
100 CAPSULE, LIQUID FILLED ORAL 2 TIMES DAILY
Status: DISCONTINUED | OUTPATIENT
Start: 2018-07-04 | End: 2018-07-07 | Stop reason: HOSPADM

## 2018-07-04 RX ORDER — PANTOPRAZOLE SODIUM 40 MG/1
40 TABLET, DELAYED RELEASE ORAL DAILY
Status: DISCONTINUED | OUTPATIENT
Start: 2018-07-04 | End: 2018-07-04

## 2018-07-04 RX ORDER — DIPHENHYDRAMINE HYDROCHLORIDE 50 MG/ML
25 INJECTION INTRAMUSCULAR; INTRAVENOUS EVERY 6 HOURS PRN
Status: DISCONTINUED | OUTPATIENT
Start: 2018-07-04 | End: 2018-07-07 | Stop reason: HOSPADM

## 2018-07-04 RX ORDER — FENTANYL CITRATE 50 UG/ML
INJECTION, SOLUTION INTRAMUSCULAR; INTRAVENOUS AS NEEDED
Status: DISCONTINUED | OUTPATIENT
Start: 2018-07-03 | End: 2018-07-04 | Stop reason: SURG

## 2018-07-04 RX ORDER — IBUPROFEN 600 MG/1
600 TABLET ORAL EVERY 6 HOURS PRN
Status: DISCONTINUED | OUTPATIENT
Start: 2018-07-04 | End: 2018-07-07 | Stop reason: HOSPADM

## 2018-07-04 RX ORDER — SENNOSIDES 8.6 MG
1 TABLET ORAL DAILY
Status: DISCONTINUED | OUTPATIENT
Start: 2018-07-04 | End: 2018-07-07 | Stop reason: HOSPADM

## 2018-07-04 RX ORDER — MAGNESIUM HYDROXIDE/ALUMINUM HYDROXICE/SIMETHICONE 120; 1200; 1200 MG/30ML; MG/30ML; MG/30ML
15 SUSPENSION ORAL EVERY 6 HOURS PRN
Status: DISCONTINUED | OUTPATIENT
Start: 2018-07-04 | End: 2018-07-07 | Stop reason: HOSPADM

## 2018-07-04 RX ORDER — PANTOPRAZOLE SODIUM 40 MG/1
40 TABLET, DELAYED RELEASE ORAL
Status: DISCONTINUED | OUTPATIENT
Start: 2018-07-04 | End: 2018-07-07 | Stop reason: HOSPADM

## 2018-07-04 RX ORDER — FENTANYL CITRATE/PF 50 MCG/ML
50 SYRINGE (ML) INJECTION
Status: DISCONTINUED | OUTPATIENT
Start: 2018-07-04 | End: 2018-07-04

## 2018-07-04 RX ORDER — ACETAMINOPHEN 325 MG/1
650 TABLET ORAL EVERY 6 HOURS PRN
Status: DISCONTINUED | OUTPATIENT
Start: 2018-07-04 | End: 2018-07-07 | Stop reason: HOSPADM

## 2018-07-04 RX ORDER — OXYCODONE HYDROCHLORIDE AND ACETAMINOPHEN 5; 325 MG/1; MG/1
1 TABLET ORAL EVERY 4 HOURS PRN
Status: DISCONTINUED | OUTPATIENT
Start: 2018-07-04 | End: 2018-07-07 | Stop reason: HOSPADM

## 2018-07-04 RX ORDER — KETOROLAC TROMETHAMINE 30 MG/ML
30 INJECTION, SOLUTION INTRAMUSCULAR; INTRAVENOUS EVERY 6 HOURS
Status: COMPLETED | OUTPATIENT
Start: 2018-07-04 | End: 2018-07-06

## 2018-07-04 RX ORDER — OXYCODONE HYDROCHLORIDE AND ACETAMINOPHEN 5; 325 MG/1; MG/1
2 TABLET ORAL EVERY 4 HOURS PRN
Status: DISCONTINUED | OUTPATIENT
Start: 2018-07-04 | End: 2018-07-07 | Stop reason: HOSPADM

## 2018-07-04 RX ADMIN — KETOROLAC TROMETHAMINE 30 MG: 30 INJECTION, SOLUTION INTRAMUSCULAR at 15:02

## 2018-07-04 RX ADMIN — FENTANYL CITRATE 50 MCG: 50 INJECTION INTRAMUSCULAR; INTRAVENOUS at 01:00

## 2018-07-04 RX ADMIN — FENTANYL CITRATE 50 MCG: 50 INJECTION INTRAMUSCULAR; INTRAVENOUS at 00:41

## 2018-07-04 RX ADMIN — DOCUSATE SODIUM 100 MG: 100 CAPSULE, LIQUID FILLED ORAL at 17:56

## 2018-07-04 RX ADMIN — KETOROLAC TROMETHAMINE 30 MG: 30 INJECTION, SOLUTION INTRAMUSCULAR at 02:42

## 2018-07-04 RX ADMIN — KETOROLAC TROMETHAMINE 30 MG: 30 INJECTION, SOLUTION INTRAMUSCULAR at 08:04

## 2018-07-04 RX ADMIN — DOCUSATE SODIUM 100 MG: 100 CAPSULE, LIQUID FILLED ORAL at 08:05

## 2018-07-04 RX ADMIN — PANTOPRAZOLE SODIUM 40 MG: 40 TABLET, DELAYED RELEASE ORAL at 06:26

## 2018-07-04 RX ADMIN — OXYCODONE HYDROCHLORIDE AND ACETAMINOPHEN 2 TABLET: 5; 325 TABLET ORAL at 22:16

## 2018-07-04 RX ADMIN — SODIUM CHLORIDE, SODIUM LACTATE, POTASSIUM CHLORIDE, AND CALCIUM CHLORIDE 125 ML/HR: .6; .31; .03; .02 INJECTION, SOLUTION INTRAVENOUS at 11:44

## 2018-07-04 RX ADMIN — HYDROMORPHONE HYDROCHLORIDE 0.5 MG: 1 INJECTION, SOLUTION INTRAMUSCULAR; INTRAVENOUS; SUBCUTANEOUS at 02:42

## 2018-07-04 RX ADMIN — HYDROMORPHONE HYDROCHLORIDE: 10 INJECTION, SOLUTION INTRAMUSCULAR; INTRAVENOUS; SUBCUTANEOUS at 01:56

## 2018-07-04 RX ADMIN — KETOROLAC TROMETHAMINE 30 MG: 30 INJECTION, SOLUTION INTRAMUSCULAR at 21:28

## 2018-07-04 RX ADMIN — AZITHROMYCIN MONOHYDRATE 500 MG: 500 INJECTION, POWDER, LYOPHILIZED, FOR SOLUTION INTRAVENOUS at 04:34

## 2018-07-04 RX ADMIN — SENNOSIDES 8.6 MG: 8.6 TABLET ORAL at 08:05

## 2018-07-04 NOTE — ANESTHESIA PREPROCEDURE EVALUATION
Review of Systems/Medical History  Patient summary reviewed  Chart reviewed  No history of anesthetic complications     Cardiovascular  Hypertension ,    Pulmonary  Smoker cigarette smoker  ,        GI/Hepatic            Endo/Other     GYN  Currently pregnant ,          Hematology   Musculoskeletal       Neurology   Psychology                Anesthesia Plan  ASA Score- 2 Emergent    Anesthesia Type- general with ASA Monitors  Additional Monitors:   Airway Plan: ETT  Comment: Emergency STAT  due prolapsed cord and fetal distress        Plan Factors-    Induction- intravenous and rapid sequence induction  Postoperative Plan- Plan for postoperative opioid use   Planned trial extubation    Informed Consent-

## 2018-07-04 NOTE — OP NOTE
OPERATIVE REPORT  PATIENT NAME: Luara Santizo    :  1980  MRN: [id number]  Pt Location: [facility] L&D OR ROOM 01    SURGERY DATE: 7/3/2018    Surgeon(s) and Role:     * Eric Hutchison DO - Primary     * Youlanda Koyanagi, MD - Assisting    Preop Diagnosis:  Cord prolapse [O69 0XX0]    Post-Op Diagnosis Codes:     * Cord prolapse [O69 0XX0]    Procedure(s) (LRB):   SECTION () (Bilateral)    Specimen(s):  ID Type Source Tests Collected by Time Destination   1 :  Tissue (Placenta on Hold) OB Only Placenta TISSUE EXAM OB (PLACENTA) ONLY Patricia oGmes, DO [id number]    A :  Cord Blood Cord BLOOD GAS, VENOUS, CORD, BLOOD GAS, ARTERIAL, CORD Patricia Gomes, DO 3/4/0837 7119        Estimated Blood Loss:   * Case end time is documented earlier than case start time so the value cannot be calculated  *    Drains:  Urethral Catheter Double-lumen; Latex 16 Fr  (Active)   Number of days: 0       Anesthesia Type:   General    Operative Indications:  Cord prolapse [O69 0XX0]    Operative Findings:  Extremely thick lower uterine segment  Viable female  weighing 5lbs1 1oz, with APGARS 8/9      Cord gas:    Dx:  Cord prolapse    Ref Range & Units 7/3/18 2258 Flag   pH, Cord Jaime 7 190 - 7 490 7 271     pCO2, Cord Jaime 27 0 - 43 0 mm HG 46 9   H    pO2, Cord Jaime 15 0 - 45 0 mm HG 29 7     HCO3, Cord Jaime 12 2 - 28 6 mmol/L 21 1     Base Exc, Cord Jaime 1 0 - 9 0 mmol/L -6 0   L    O2 Cont, Cord Jaime mL/dL 16 6     O2 HGB,VENOUS CORD % 67 5        Specimen Collected: 18 22:58   Last Resulted: 18 23:15                  Complications:   None    Brief Hospital Course:  Odessa Colon is a 52-year-old  81 female who presented to Labor and delivery in active labor at 34 weeks and 5 days  She was moved to a labor room for expected management  She spontaneously ruptured her membranes and shortly thereafter the fetal heart tones showed a deceleration    Upon examination the cord was palpable through the cervix and therefore the decision was made to proceed to the OR for stat  section secondary to cord prolapse  Patient was made aware of the findings and the plan for delivery  Procedure and Technique:  The patient was taken to the operating room and general anesthesia was administered after betadine was splashed on the abdomen and the drape applied  An incision was made in the skin with a surgical scalpel and carried through to the level of the fascia which was then nicked in the midline  Using blunt dissection the fascia incision was extended, then the rectus muscles were  in the midline and the peritoneum entered bluntly and the incision extended superiorly inferiorly with blunt dissection  A bladder blade was inserted and a transverse incision was made in the lower uterine segment using a new surgical blade  The incision was extended using blunt dissection  The lower uterine segment was extremely thick and due to inability to extract the fetus the incision was T'd to allow access to the fetus, which had turned into transverse position  The head was grasped and brought through the incision using a vacuum to help guide the baby's head out of the uterine incision  Minimal traction was applied  On delivery the cord was doubly clamped and cut  The infant was then passed off the table to the awaiting  staff  Venous and arterial blood gas, cord blood, and portion of cord was obtained for analysis and routine blood testing  The placenta delivery was then delivered with gentle traction and uterine massage  Placenta was noted to be intact with a centrally inserted three-vessel cord  Oxytocin was administered by IV infusion to enhance uterine contraction  The uterus was exteriorized and cleared of all clots and debris  The extension of the uterine incision was repaired 1st using an 0 Monocryl in a running locked fashion    The transverse uterine incision was re approximated using a 0 Monocryl in a running locked fashion  A second imbricating layer of the same suture was used to obtain excellent hemostasis on both aspects of the incision  The uterine incision was examined and after 2 figure-of-eight stitches were placed, the area was hemostatic  The posterior cul-de-sac was cleared of all clots  The uterus was replaced into the abdomen and the pericolic gutters were cleared of all clots  The uterine incision was once again reexamined and noted to be hemostatic  The peritoneum was reapproximated using 2 0 plain in a running fashion  The fascia was re approximated using 0 Vicryl in a running nonlocked fashion  The subcutaneous tissue was irrigated and cleared of all clots and debris  Good hemostasis was otained with Bovie electrocautery The subcutaneous  tissue was reapproximated with 2-0 Plain suture in interrupted fashion  The skin incision was closed using 4 0 monocryl in a subcuticular fashion  Histoacryl was placed on top of the skin incision  Good hemostasis was noted  Patient tolerated the procedure well  All needle, sponge, and instrument counts were noted to be correct x 2 at the end of the procedure  Patient was awakened from anesthesia and transferred to the recovery room in stable condition  I was present for the entire procedure              Patient Disposition:  PACU     SIGNATURE: Delonte Saldaña DO  DATE: July 4, 2018  TIME: 2:00 AM

## 2018-07-04 NOTE — DISCHARGE SUMMARY
Discharge Summary -   Bud Santizo 40 y o  female MRN: [id number]  Unit/Bed#: LD OR  Encounter: [id number]    Admission Date: 7/3/2018     Discharge Date: 18    Attending: Elliott Davies MD    Principal Diagnosis:  pregnancy  Single fetus      Secondary Diagnosis:  pregnancy <37 weeks, Cord Prolapse     Procedures: Emergency  Primary  section    Hospital Course: Patient delivered and underwent normal post delivery care  She is ambulating well, voiding on her own, passing flatus, and tolerating oral intake  Complications:  Patient was transfused 1 unit of PRBC for hemoglobin of 5 4 repeated hemoglobin was 7 3    Condition at discharge: good     Discharge Medications: For a complete list of the patient's medications, please refer to her medical reconcillation report  Discharge instructions/Information to patient and family:   See after visit summary for information provided to patient and family  Provisions for Follow-Up Care:  See after visit summary for information related to follow-up care and any pertinent home health orders  Disposition: See After Visit Summary for discharge disposition information      Planned Readmission: Mat Gonzales [id number] PGY3  7/3/2018  11:58 PM      [address] [place] [address] PGY-3

## 2018-07-04 NOTE — PROGRESS NOTES
PostOp Progress Note - OB/GYN   Gaviota Santizo 40 y o  female MRN: [id number]  Unit/Bed#: -01 Encounter: [id number]    Postop Day: 1    Procedure: Primary low transverse  section    Subjective ROS  Pain: yes, mild well controlled with PCA   Tolerating Oral Intake: yes tolerating water   Voiding: montelongo in place   Flatus: no  Bowel Movement: no  Ambulating: no  Breastfeeding: no  Chest Pain: no  Shortness of Breath: no  Leg Pain/Discomfort: no  Lochia: moderate      Vitals: /56 (BP Location: Right arm)   Pulse 103   Temp 98 7 °F (37 1 °C) (Oral)   Resp 18   Ht 5' 2" (1 575 m)   Wt 86 2 kg (190 lb)   LMP 2017 (Within Days)       Intake/Output Summary (Last 24 hours) at 18 1511  Last data filed at 18 0330   Gross per 24 hour   Intake             1000 ml   Output             1050 ml   Net              -50 ml       Physical Exam  General: appears well, NAD, alert and oriented x 3, pleasant and cooperative   Cardiovascular: Regular, Rate and Rhythm, no murmur, gallop or rub   Lungs: Clear to Auscultation Bilaterally, no wheezing, rhonchi or rales   Abdomen: Soft, non-distended, non-tender, no rebound, guarding or CVA tenderness, bowel sounds+  Fundus: Firm & Non-Tender   Fundal Location:   at the umbilicus  Incision: clean, dry, and intact  Lower Extremities: Non-Tender, no edema  Other:    Labs:   Admission on 2018   Component Date Value    WBC 2018 14 76*    RBC 2018 2 42*    Hemoglobin 2018 8 4*    Hematocrit 2018 24 4*    MCV 2018 101*    MCH 2018 34 7*    MCHC 2018 34 4     RDW 2018 16 8*    MPV 2018 10 0     Platelets  249     nRBC 2018 0     Neutrophils Relative 2018 77*    Immat GRANS % 2018 1     Lymphocytes Relative 2018 15     Monocytes Relative 2018 6     Eosinophils Relative 2018 1     Basophils Relative 2018 0     Neutrophils Absolute 2018 11 49*    Immature Grans Absolute 2018 0 12     Lymphocytes Absolute 2018 2 17     Monocytes Absolute 2018 0 81     Eosinophils Absolute 2018 0 15     Basophils Absolute 2018 0 02     Sodium 2018 137     Potassium 2018 3 4*    Chloride 2018 106     CO2 2018 21     Anion Gap 2018 10     BUN 2018 6     Creatinine 2018 0 38*    Glucose 2018 78     Calcium 2018 8 8     AST 2018 8     ALT 2018 9*    Alkaline Phosphatase 2018 93     Total Protein 2018 6 8     Albumin 2018 2 7*    Total Bilirubin 2018 0 63     eGFR 2018 137     Creatinine, Ur 2018 64 5     Microalbum  ,U,Random 2018 126 0*    Microalb Creat Ratio 2018 195*    Uric Acid 2018 5 0     ABO Grouping 2018 B     Rh Factor 2018 Positive     Antibody Screen 2018 Negative     Specimen Expiration Date 2018 [id number]     pH, Cord Jaime 2018 7 271     pCO2, Cord Jaime 2018 46 9*    pO2, Cord Jaime 2018 29 7     HCO3, Cord Jaime 2018 21 1    [facility] Exc, Cord Jaime 2018 -6 0*    O2 Cont, Cord Jaime 2018 16 6     O2 HGB,VENOUS CORD 2018 67 5     WBC 2018 24 30*    RBC 2018 2 11*    Hemoglobin 2018 7 6*    Hematocrit 2018 21 4*    MCV 2018 101*    MCH 2018 36 0*    MCHC 2018 35 5     RDW 2018 17 0*    MPV 2018 10 3     Platelets  236        Assessment:  40 y o  [id number] s/p , due to cord prolapse  post-operative day 1    Plan:  Continue routine care, Advance diet and Encourage ambulation   - incision check by OB team     Mat Ahn 7480 PGY2  2018  6:33 AM

## 2018-07-04 NOTE — PLAN OF CARE
POSTPARTUM     Experiences normal postpartum course Progressing     Appropriate maternal -  bonding Progressing     Establishment of infant feeding pattern Progressing     Incision(s), wounds(s) or drain site(s) healing without S/S of infection Progressing          POSTPARTUM     Experiences normal postpartum course Progressing     Appropriate maternal -  bonding Progressing     Establishment of infant feeding pattern Progressing     Incision(s), wounds(s) or drain site(s) healing without S/S of infection Progressing

## 2018-07-04 NOTE — PROGRESS NOTES
H&P Exam - Obstetrics   Lauren Santizo 40 y o  female MRN: [id number]  Unit/Bed#: -01 Encounter: [id number]      History of Present Illness     Chief Complaint: Contractions    HPI:  Carol Christian is a 40 y o  [id number] female with an ALBERTO of 2018, by Ultrasound at 34w5d weeks gestation who is being admitted for Active labor  Her current obstetrical history is significant for gestational hypertension  Contractions: Date/time of onset: 5pm, michael every 5 minutes   Leakage of fluid: None  Bleeding: mild vaginal bleeding with mucous plug   Fetal movement: present  PREGNANCY COMPLICATIONS: gestational hypertension, late to prenatal care  OB History    Para Term  AB Living   4 2 2 0 1 2   SAB TAB Ectopic Multiple Live Births   0 0 0          # Outcome Date GA Lbr Enoch/2nd Weight Sex Delivery Anes PTL Lv   4 Current            3 AB            2 Term            1 Term                   Baby complications/comments: none     Review of Systems   Constitutional: Negative for fatigue and fever  HENT: Negative for congestion and sore throat  Respiratory: Negative for cough and shortness of breath  Cardiovascular: Negative for chest pain and palpitations  Gastrointestinal: Negative for abdominal pain  Genitourinary: Negative for difficulty urinating  Neurological: Negative for dizziness and headaches         Historical Information   Past Medical History:   Diagnosis Date    Anemia     Anemia     Endometriosis     Obesity 3/6/2018    Pre-eclampsia in third trimester 2016    Pre-eclampsia in third trimester     Varicella     had chicken pox     Past Surgical History:   Procedure Laterality Date    APPENDECTOMY      APPENDECTOMY      Last Assessed: 10/12/2017- Laparoscopic     EAR SURGERY      Eustachian Tube     EXPLORATORY LAPAROTOMY      for endometriosis    TONSILLECTOMY       Social History   History   Alcohol Use No     History   Drug Use No     History Smoking Status    Former Smoker   Smokeless Tobacco    Former User     Family History: non-contributory    Meds/Allergies      Prescriptions Prior to Admission   Medication    naproxen (NAPROSYN) 500 mg tablet    nitrofurantoin (MACROBID) 100 mg capsule        Allergies   Allergen Reactions    Penicillins Other (See Comments)     Took as a child and had trouble breathing per pt mom    Ciprofloxacin Rash    Ciprofloxacin Rash    Penicillins Rash    Sulfa Antibiotics Rash       OBJECTIVE:    Vitals: Blood pressure 151/89, pulse 101, temperature 98 5 °F (36 9 °C), temperature source Temporal, resp  rate 18, last menstrual period 08/28/2017, unknown if currently breastfeeding  There is no height or weight on file to calculate BMI  Physical Exam   Constitutional: She appears well-developed  No distress  HENT:   Mouth/Throat: Oropharynx is clear and moist    Eyes: Conjunctivae are normal    Neck: Neck supple  Cardiovascular: Normal rate and normal heart sounds  No murmur heard  Pulmonary/Chest: Effort normal and breath sounds normal    Abdominal: Soft  Neurological: She is alert         Cervix: Bulging bag, no cervix appreciated, head no engaged on the cervix        Fetal heart rate: Baseline: 160 bpm, Variability: Moderate 6 - 25 bpm, Accelerations: Non-reactive, Decelerations: Variables: moderate and Category 2       Joslin: regular, every 5 minutes       Prenatal Labs:   Blood Type:   Lab Results   Component Value Date    ABO B 07/03/2018     , D (Rh type):   Lab Results   Component Value Date    RH Positive 07/03/2018    HCT/HGB:   Lab Results   Component Value Date    HCT 24 4 (L) 07/03/2018    HGB 8 4 (L) 07/03/2018   1hour Glucola: 132   Rubella: Immune    VDRL/RPR: Negative   Hep B: Negative  HIV: Negative     Invasive Devices     Peripheral Intravenous Line            Peripheral IV 07/03/18 Left Hand less than 1 day                  Assessment/Plan     ASSESSMENT:   IUP at 34w5d weeks gestation with active labor  PLAN:   1) Admit   2) CBC, RPR, Blood Type   3) Analgesia and/or epidural at patient request   4) Anticipate    5) Betamethasone and cefazolin 2 g       This patient will be an INPATIENT  and I certify the anticipated length of stay is >2 Midnights      Carri Franz DO  Family Practice Resident PGY3  7/3/2018  10:25 PM

## 2018-07-04 NOTE — PLAN OF CARE
INFECTION - ADULT     Absence or prevention of progression during hospitalization Progressing     Absence of fever/infection during neutropenic period Progressing        Knowledge Deficit     Patient/family/caregiver demonstrates understanding of disease process, treatment plan, medications, and discharge instructions Progressing        PAIN - ADULT     Verbalizes/displays adequate comfort level or baseline comfort level Progressing        POSTPARTUM     Experiences normal postpartum course Progressing     Appropriate maternal -  bonding Progressing     Establishment of infant feeding pattern Progressing     Incision(s), wounds(s) or drain site(s) healing without S/S of infection Progressing        SAFETY ADULT     Patient will remain free of falls Progressing     Maintain or return to baseline ADL function Progressing     Maintain or return mobility status to optimal level Progressing

## 2018-07-05 LAB
ERYTHROCYTE [DISTWIDTH] IN BLOOD BY AUTOMATED COUNT: 17.5 % (ref 11.6–15.1)
ERYTHROCYTE [DISTWIDTH] IN BLOOD BY AUTOMATED COUNT: 17.5 % (ref 11.6–15.1)
HCT VFR BLD AUTO: 15.5 % (ref 34.8–46.1)
HCT VFR BLD AUTO: 20.8 % (ref 34.8–46.1)
HGB BLD-MCNC: 5.4 G/DL (ref 11.5–15.4)
HGB BLD-MCNC: 7.3 G/DL (ref 11.5–15.4)
MCH RBC QN AUTO: 34.8 PG (ref 26.8–34.3)
MCH RBC QN AUTO: 36 PG (ref 26.8–34.3)
MCHC RBC AUTO-ENTMCNC: 34.8 G/DL (ref 31.4–37.4)
MCHC RBC AUTO-ENTMCNC: 35.1 G/DL (ref 31.4–37.4)
MCV RBC AUTO: 103 FL (ref 82–98)
MCV RBC AUTO: 99 FL (ref 82–98)
PLATELET # BLD AUTO: 180 THOUSANDS/UL (ref 149–390)
PLATELET # BLD AUTO: 207 THOUSANDS/UL (ref 149–390)
PMV BLD AUTO: 10 FL (ref 8.9–12.7)
PMV BLD AUTO: 9.6 FL (ref 8.9–12.7)
RBC # BLD AUTO: 1.5 MILLION/UL (ref 3.81–5.12)
RBC # BLD AUTO: 2.1 MILLION/UL (ref 3.81–5.12)
WBC # BLD AUTO: 11.23 THOUSAND/UL (ref 4.31–10.16)
WBC # BLD AUTO: 19.3 THOUSAND/UL (ref 4.31–10.16)

## 2018-07-05 PROCEDURE — 85027 COMPLETE CBC AUTOMATED: CPT | Performed by: FAMILY MEDICINE

## 2018-07-05 PROCEDURE — 30233N1 TRANSFUSION OF NONAUTOLOGOUS RED BLOOD CELLS INTO PERIPHERAL VEIN, PERCUTANEOUS APPROACH: ICD-10-PCS | Performed by: OBSTETRICS & GYNECOLOGY

## 2018-07-05 PROCEDURE — 85027 COMPLETE CBC AUTOMATED: CPT | Performed by: OBSTETRICS & GYNECOLOGY

## 2018-07-05 PROCEDURE — P9021 RED BLOOD CELLS UNIT: HCPCS

## 2018-07-05 PROCEDURE — 99232 SBSQ HOSP IP/OBS MODERATE 35: CPT | Performed by: FAMILY MEDICINE

## 2018-07-05 RX ORDER — DIPHENHYDRAMINE HCL 25 MG
25 TABLET ORAL ONCE
Status: COMPLETED | OUTPATIENT
Start: 2018-07-05 | End: 2018-07-05

## 2018-07-05 RX ORDER — FERROUS SULFATE 325(65) MG
325 TABLET ORAL
Status: DISCONTINUED | OUTPATIENT
Start: 2018-07-06 | End: 2018-07-07 | Stop reason: HOSPADM

## 2018-07-05 RX ORDER — ACETAMINOPHEN 325 MG/1
650 TABLET ORAL ONCE
Status: COMPLETED | OUTPATIENT
Start: 2018-07-05 | End: 2018-07-05

## 2018-07-05 RX ADMIN — DOCUSATE SODIUM 100 MG: 100 CAPSULE, LIQUID FILLED ORAL at 17:59

## 2018-07-05 RX ADMIN — DOCUSATE SODIUM 100 MG: 100 CAPSULE, LIQUID FILLED ORAL at 10:16

## 2018-07-05 RX ADMIN — PANTOPRAZOLE SODIUM 40 MG: 40 TABLET, DELAYED RELEASE ORAL at 06:20

## 2018-07-05 RX ADMIN — KETOROLAC TROMETHAMINE 30 MG: 30 INJECTION, SOLUTION INTRAMUSCULAR at 18:01

## 2018-07-05 RX ADMIN — OXYCODONE HYDROCHLORIDE AND ACETAMINOPHEN 2 TABLET: 5; 325 TABLET ORAL at 15:07

## 2018-07-05 RX ADMIN — DIPHENHYDRAMINE HYDROCHLORIDE 25 MG: 25 TABLET ORAL at 11:15

## 2018-07-05 RX ADMIN — KETOROLAC TROMETHAMINE 30 MG: 30 INJECTION, SOLUTION INTRAMUSCULAR at 03:59

## 2018-07-05 RX ADMIN — KETOROLAC TROMETHAMINE 30 MG: 30 INJECTION, SOLUTION INTRAMUSCULAR at 11:16

## 2018-07-05 RX ADMIN — OXYCODONE HYDROCHLORIDE AND ACETAMINOPHEN 2 TABLET: 5; 325 TABLET ORAL at 06:20

## 2018-07-05 RX ADMIN — SENNOSIDES 8.6 MG: 8.6 TABLET ORAL at 10:16

## 2018-07-05 RX ADMIN — OXYCODONE HYDROCHLORIDE AND ACETAMINOPHEN 2 TABLET: 5; 325 TABLET ORAL at 20:50

## 2018-07-05 RX ADMIN — OXYCODONE HYDROCHLORIDE AND ACETAMINOPHEN 2 TABLET: 5; 325 TABLET ORAL at 10:20

## 2018-07-05 RX ADMIN — ACETAMINOPHEN 650 MG: 325 TABLET, FILM COATED ORAL at 11:16

## 2018-07-05 NOTE — PROGRESS NOTES
Postpartum Progress Note       PROCEDURE: Primary Low  Delivery     SUBJECTIVE:    Pain: yes, well controlled with current pain regimen     Tolerating Oral Intake: yes     Voiding: yes    Flatus: yes    Bowel Movement: no    Ambulating: yes    Breastfeeding: yes    Chest Pain: no    Shortness of Breath: no    Leg Pain/Discomfort: no    Lochia: minimal      OBJECTIVE:    General: No Acute Distress     Abdomen: Soft, non-distended, non-tender, no rebound, guarding or CVA tenderness     Fundus: Firm & Non-Tender     Fundal Location:  -1 cm below the umbilicus    Lower Extremities: Non-Tender    Vitals:    18 0415   BP: 139/78   Pulse: (!) 110   Resp: 16   Temp: 98 4 °F (36 9 °C)   SpO2: 100%         Results from last 7 days  Lab Units 18  0608 18  0450 18  2210   WBC Thousand/uL 11 23* 24 30* 14 76*   HEMOGLOBIN g/dL 5 4* 7 6* 8 4*   HEMATOCRIT % 15 5* 21 4* 24 4*   PLATELETS Thousands/uL 180 236 249   NEUTROS PCT %  --  71 77*   MONOS PCT %  --  8 6       Results from last 7 days  Lab Units 18  2210   SODIUM mmol/L 137   POTASSIUM mmol/L 3 4*   CHLORIDE mmol/L 106   CO2 mmol/L 21   BUN mg/dL 6   CREATININE mg/dL 0 38*   CALCIUM mg/dL 8 8   TOTAL PROTEIN g/dL 6 8   BILIRUBIN TOTAL mg/dL 0 63   ALK PHOS U/L 93   ALT U/L 9*   AST U/L 8   GLUCOSE RANDOM mg/dL 78     LABS / TESTS / MEDICATION:      Admission on 2018   Component Date Value    WBC 2018 14 76*    RBC 2018 2 42*    Hemoglobin 2018 8 4*    Hematocrit 2018 24 4*    MCV 2018 101*    MCH 2018 34 7*    MCHC 2018 34 4     RDW 2018 16 8*    MPV 2018 10 0     Platelets  249     nRBC 2018 0     Neutrophils Relative 2018 77*    Immat GRANS % 2018 1     Lymphocytes Relative 2018 15     Monocytes Relative 2018 6     Eosinophils Relative 2018 1     Basophils Relative 2018 0     Neutrophils Absolute 07/03/2018 11 49*    Immature Grans Absolute 07/03/2018 0 12     Lymphocytes Absolute 07/03/2018 2 17     Monocytes Absolute 07/03/2018 0 81     Eosinophils Absolute 07/03/2018 0 15     Basophils Absolute 07/03/2018 0 02     Sodium 07/03/2018 137     Potassium 07/03/2018 3 4*    Chloride 07/03/2018 106     CO2 07/03/2018 21     Anion Gap 07/03/2018 10     BUN 07/03/2018 6     Creatinine 07/03/2018 0 38*    Glucose 07/03/2018 78     Calcium 07/03/2018 8 8     AST 07/03/2018 8     ALT 07/03/2018 9*    Alkaline Phosphatase 07/03/2018 93     Total Protein 07/03/2018 6 8     Albumin 07/03/2018 2 7*    Total Bilirubin 07/03/2018 0 63     eGFR 07/03/2018 137     Creatinine, Ur 07/03/2018 64 5     Microalbum  ,U,Random 07/03/2018 126 0*    Microalb Creat Ratio 07/03/2018 195*    Uric Acid 07/03/2018 5 0     ABO Grouping 07/03/2018 B     Rh Factor 07/03/2018 Positive     Antibody Screen 07/03/2018 Negative     Specimen Expiration Date 07/03/2018 [id number]     RPR 07/03/2018 Non-Reactive     pH, Cord Jaime 07/03/2018 7 271     pCO2, Cord Jaime 07/03/2018 46 9*    pO2, Cord Jaime 07/03/2018 29 7     HCO3, Cord Jaime 07/03/2018 21 1    [facility] Exc, Cord Jaime 07/03/2018 -6 0*    O2 Cont, Cord Jaime 07/03/2018 16 6     O2 HGB,VENOUS CORD 07/03/2018 67 5     WBC 07/04/2018 24 30*    RBC 07/04/2018 2 11*    Hemoglobin 07/04/2018 7 6*    Hematocrit 07/04/2018 21 4*    MCV 07/04/2018 101*    MCH 07/04/2018 36 0*    MCHC 07/04/2018 35 5     RDW 07/04/2018 17 0*    MPV 07/04/2018 10 3     Platelets 48/51/3513 236     Neutrophils Relative 07/04/2018 71     Immat GRANS % 07/04/2018 1     Lymphocytes Relative 07/04/2018 20     Monocytes Relative 07/04/2018 8     Eosinophils Relative 07/04/2018 0     Basophils Relative 07/04/2018 0     Neutrophils Absolute 07/04/2018 5 99     Immature Grans Absolute 07/04/2018 0 04     Lymphocytes Absolute 07/04/2018 1 71     Monocytes Absolute 07/04/2018 0 65     Eosinophils Absolute 07/04/2018 0 03     Basophils Absolute 07/04/2018 0 02     WBC 07/05/2018 11 23*    RBC 07/05/2018 1 50*    Hemoglobin 07/05/2018 5 4*    Hematocrit 07/05/2018 15 5*    MCV 07/05/2018 103*    MCH 07/05/2018 36 0*    MCHC 07/05/2018 34 8     RDW 07/05/2018 17 5*    Platelets 18/93/3643 180     MPV 07/05/2018 10 0        Current Facility-Administered Medications   Medication Dose Route Frequency    acetaminophen (TYLENOL) tablet 650 mg  650 mg Oral Q6H PRN    aluminum-magnesium hydroxide-simethicone (MYLANTA) 200-200-20 mg/5 mL oral suspension 15 mL  15 mL Oral Q6H PRN    diphenhydrAMINE (BENADRYL) injection 25 mg  25 mg Intravenous Q6H PRN    docusate sodium (COLACE) capsule 100 mg  100 mg Oral BID    ibuprofen (MOTRIN) tablet 600 mg  600 mg Oral Q6H PRN    ketorolac (TORADOL) injection 30 mg  30 mg Intravenous Q6H    metoclopramide (REGLAN) injection 10 mg  10 mg Intravenous Once PRN    ondansetron (ZOFRAN) injection 4 mg  4 mg Intravenous Q8H PRN    oxyCODONE-acetaminophen (PERCOCET) 5-325 mg per tablet 1 tablet  1 tablet Oral Q4H PRN    oxyCODONE-acetaminophen (PERCOCET) 5-325 mg per tablet 2 tablet  2 tablet Oral Q4H PRN    pantoprazole (PROTONIX) EC tablet 40 mg  40 mg Oral Early Morning    senna (SENOKOT) tablet 8 6 mg  1 tablet Oral Daily       ASSESSMENT AND PLAN:   Postoperative day # 2   Status post: Primary LTCS due to cord prolapse   1  Post-operative hemoglobin 5 4 this AM from 7 6- will discuss with OB and [facility] team now and likely transfuse  Patient asymptomatic this morning  2  Continue Routine Postpartum Care  3  Encourage Ambulation  4  Advance diet as tolerated  5  Plan Contraception discussed: Continue discussion   6   Anticipate discharge tomorrow     Signature/Title: Laila Mullen DO  Date: 7/5/2018  Time: 8:12 AM

## 2018-07-05 NOTE — PROGRESS NOTES
Update:    Patient seen and evaluated in the late morning  She had hemoglobin decrease POD#2, however had been continuing to receive IV fluids  Patient denied SOB, nausea, blurred vision, palpitations  She did complain of 1 episode of lightheadedness when sitting up but this resolved in seconds  Recent Labs      07/03/18   2210  07/04/18   0450  07/05/18   0608   HGB  8 4*  7 6*  5 4*     Following discussion with the OBGYN team, in the setting of her post-operative emergency C/S healing, patient will receive 1u pRBCs  Discussed with patient and she voiced understanding of risks and benefits       Bisi Lucero MD  07/05/18

## 2018-07-05 NOTE — SOCIAL WORK
NICU admission and consult for "late to prenatal care"  Per chart, no UDS done for pt, baby UDS negative, and baby cord tox pending  Per records, pt had positive pregnancy test in 2018 after miscarriage in 2017, saw PCP beginning of 2018 to confirm pregnancy and start care  Pt attempted to have ultrasound mid March but had to reschedule with  center for beginning of April at which time she was about 21wks and continued to see her PCP for [facility]  Baby girl Renee Esters was born via c/s on 7/3 at 29 5/7wks  Met with pt ([phone]) and FOB/SO Carol Primes "DJ" Head ([phone]) to introduce Cm services and provide NICU resource packet with [facility], [facility], and CM contact info  Pt and FOB report they are doing ok and this is their 2nd child together; 3rd kid for pt and 4th for FOB  Pt and FOB have a 3 yr old daughter together, and pt has a 12 yr old from previous relationship, and FOB has a 23 and 15 yr old kids  Pt and FOB report they live together in [address] [place] [address] with their 2 yr old and pt's 12 yr old, have good family support system, have all baby supplies needed, will bottle feed, does not have WIC but has the info to call if interested, FOB is employed, pt is a stay at home mom, ped is [facility] [address] in [place], and family has cars for transportation as needed  Pt denies any mental health issues, drug use, or involvement with VNA or C&Y though pt does reports hx with [employer]NA for parenting support when she had her first baby  Pt reports she started prenatal care as soon as she could once she realized she was pregnant but was initially unsure due to the miscarriage and missed periods  Reviewed NICU packet and per pt and FOB request, referral sent via secure email for [facility] care package       CM reviewed d/c planning process including the following: identifying help at home, patient preference for d/c planning needs, Discharge Lounge, [facility] Meds to Bed program, availability of treatment team to discuss questions or concerns patient and/or family may have regarding understanding medications and recognizing signs and symptoms once discharged  CM also encouraged patient to follow up with all recommended appointments after discharge  Patient advised of importance for patient and family to participate in managing patients medical well being  Pt and FOB deny any other CM needs at this time  Encouraged family to contact CM as needed  No other needs noted  Will follow

## 2018-07-05 NOTE — PROGRESS NOTES
Incision site evaluated by OBGYN resident  Incision appears clean, dry, intact without signs of inflammation  Fundus at U-1  Today is postop #2  Patient is doing well  Denies nausea, vomiting, SOB, chest pain, bilateral lower extremity tenderness  Spontaneously voiding, passing gas, ambulating around unit       Letty Conroy MD  7/5/2018  6:36 AM

## 2018-07-06 LAB
ABO GROUP BLD BPU: NORMAL
BPU ID: NORMAL
UNIT DISPENSE STATUS: NORMAL
UNIT PRODUCT CODE: NORMAL
UNIT RH: NORMAL

## 2018-07-06 PROCEDURE — 99232 SBSQ HOSP IP/OBS MODERATE 35: CPT | Performed by: FAMILY MEDICINE

## 2018-07-06 RX ORDER — FERROUS SULFATE 325(65) MG
325 TABLET ORAL
Qty: 30 TABLET | Refills: 0 | Status: SHIPPED | OUTPATIENT
Start: 2018-07-06 | End: 2018-07-07

## 2018-07-06 RX ADMIN — OXYCODONE HYDROCHLORIDE AND ACETAMINOPHEN 2 TABLET: 5; 325 TABLET ORAL at 04:03

## 2018-07-06 RX ADMIN — Medication 325 MG: at 08:33

## 2018-07-06 RX ADMIN — OXYCODONE HYDROCHLORIDE AND ACETAMINOPHEN 2 TABLET: 5; 325 TABLET ORAL at 20:59

## 2018-07-06 RX ADMIN — DOCUSATE SODIUM 100 MG: 100 CAPSULE, LIQUID FILLED ORAL at 08:33

## 2018-07-06 RX ADMIN — PANTOPRAZOLE SODIUM 40 MG: 40 TABLET, DELAYED RELEASE ORAL at 06:18

## 2018-07-06 RX ADMIN — KETOROLAC TROMETHAMINE 30 MG: 30 INJECTION, SOLUTION INTRAMUSCULAR at 00:03

## 2018-07-06 RX ADMIN — OXYCODONE HYDROCHLORIDE AND ACETAMINOPHEN 2 TABLET: 5; 325 TABLET ORAL at 13:39

## 2018-07-06 RX ADMIN — IBUPROFEN 600 MG: 600 TABLET ORAL at 17:02

## 2018-07-06 RX ADMIN — OXYCODONE HYDROCHLORIDE AND ACETAMINOPHEN 2 TABLET: 5; 325 TABLET ORAL at 08:34

## 2018-07-06 RX ADMIN — DOCUSATE SODIUM 100 MG: 100 CAPSULE, LIQUID FILLED ORAL at 17:02

## 2018-07-06 RX ADMIN — SENNOSIDES 8.6 MG: 8.6 TABLET ORAL at 08:32

## 2018-07-06 NOTE — DISCHARGE INSTRUCTIONS
AMBULATORY CARE:   What you need to know about a :  A , or  section, is abdominal surgery to deliver your baby  How to prepare for a :  Your healthcare provider will talk to you about how to prepare for surgery  He may tell you not to eat or drink anything after midnight on the day of your surgery  He will tell you what medicines to take or not take on the day of your surgery  You may be given an antibiotic through your IV to help prevent a bacterial infection  What will happen during a :  You will usually be given spinal anesthesia to numb you from the surgery area down  You may still feel pressure or pushing during the , but you should not feel any pain  Your healthcare provider will make an incision across your lower abdomen  He will gently pull your baby out  Your incision will be closed with stitches or staples and covered with a bandage  What will happen after a :  You will be taken to a room to rest for about an hour after you deliver  You will have a Hammonds catheter to drain your urine and an IV  Do not get out of bed until your healthcare provider says it is okay  What are the risks of a :  You may bleed more than expected or develop an infection  Your bladder or intestines may be injured during the procedure  You may get a blood clot in your leg  This may become life-threatening  Call 911 for any of the following:   · You feel lightheaded, short of breath, and have chest pain  · You cough up blood  Seek care immediately if:   · Blood soaks through your bandage  · Your stitches come apart  · Your arm or leg feels warm, tender, and painful  It may look swollen and red  Contact your OB if:   · You have heavy vaginal bleeding that fills 1 or more sanitary pads in 1 hour  · You have a fever  · Your incision is swollen, red, or draining pus       · You have questions or concerns about yourself or your baby   Medicines: You may  need any of the following:  · Prescription pain medicine  may be given  Ask how to take this medicine safely  · Acetaminophen  decreases pain and fever  It is available without a doctor's order  Ask how much to take and how often to take it  Follow directions  Acetaminophen can cause liver damage if not taken correctly  · NSAIDs , such as ibuprofen, help decrease swelling, pain, and fever  NSAIDs can cause stomach bleeding or kidney problems in certain people  If you take blood thinner medicine, always ask your healthcare provider if NSAIDs are safe for you  Always read the medicine label and follow directions  Wound care:  Carefully wash your wound with soap and water every day  Keep your wound clean and dry  Wear loose, comfortable clothes that do not rub against your wound  Ask your OB about bathing and showering  Limit activity as directed:   · Ask when it is safe for you to drive, walk up stairs, lift heavy objects, and have sex  · Ask when it is okay to exercise, and what types of exercise to do  Start slowly and do more as you get stronger  Drink liquids as directed:  Liquids help keep you hydrated after your procedure and decrease your risk for a blood clot  Ask how much liquid to drink each day and which liquids are best for you  Follow up with your OB as directed: You may need to return to have your stitches or staples removed  Write down your questions so you remember to ask them during your visits  © 2017 [employer] Information is for End User's use only and may not be sold, redistributed or otherwise used for commercial purposes  All illustrations and images included in CareNotes® are the copyrighted property of A D A M [employer] Inc  or Alberto Saenz  The above information is an  only  It is not intended as medical advice for individual conditions or treatments   Talk to your doctor, nurse or pharmacist before following any medical regimen to see if it is safe and effective for you

## 2018-07-06 NOTE — PLAN OF CARE
DISCHARGE PLANNING - CARE MANAGEMENT     Discharge to post-acute care or home with appropriate resources Progressing        INFECTION - ADULT     Absence or prevention of progression during hospitalization Progressing     Absence of fever/infection during neutropenic period Progressing        Knowledge Deficit     Patient/family/caregiver demonstrates understanding of disease process, treatment plan, medications, and discharge instructions Progressing        PAIN - ADULT     Verbalizes/displays adequate comfort level or baseline comfort level Progressing        POSTPARTUM     Experiences normal postpartum course Progressing     Appropriate maternal -  bonding Progressing     Establishment of infant feeding pattern Progressing     Incision(s), wounds(s) or drain site(s) healing without S/S of infection Progressing        SAFETY ADULT     Patient will remain free of falls Progressing     Maintain or return to baseline ADL function Progressing     Maintain or return mobility status to optimal level Progressing

## 2018-07-06 NOTE — PROGRESS NOTES
Progress Note - OB/GYN   Gaviota Santizo 40 y o  female MRN: [id number]  Unit/Bed#: 3 337-01 Encounter: [id number]    Assessment:  Postop Day #3 s/p stat 1LTCS for cord prolapse, stable  Baby in NICU    Plan:  1) Acute blood los anemia   Hgb 8 4 --> 7 6 --> 5 4 --> 1U pRBC --> 7 3   Asymptomatic, denies symptoms of anemia    2) Continue routine post partum/post op care   Encourage ambulation   Encourage breastfeeding   Anticipate discharge tomorrow    Subjective/Objective   Chief Complaint:     Post delivery  Patient is feeling well  Lochia WNL  Pain well controlled  Subjective:     Pain: yes, incisional, improved with meds  Tolerating PO: yes  Voiding: yes  Flatus: yes  BM: no  Ambulating: yes  Breastfeeding:  yes  Chest pain: no  Shortness of breath: no  Leg pain: no  Lochia: minimal    Objective:     Vitals: /84 Comment: nurse Tracee notified  Pulse 102   Temp 98 2 °F (36 8 °C) (Oral)   Resp 18   Ht 5' 2" (1 575 m)   Wt 86 2 kg (190 lb)   LMP 08/28/2017 (Within Days)   SpO2 97%   Breastfeeding?  No   BMI 34 75 kg/m²       Intake/Output Summary (Last 24 hours) at 07/06/18 1201  Last data filed at 07/05/18 0900   Gross per 24 hour   Intake                0 ml   Output              300 ml   Net             -300 ml       Physical Exam:     AAOx3, NAD  CV, RRR  CTA b/l, no WRR  Soft, non-tender, non-distended, no rebound or guarding, no CVA tenderness  Uterine fundus firm and non-tender, -1 cm below the umbilicus   Incision clean/dry/intact without signs of inflammation   sutures: clean, dry, and intact  Non tender      Lab Results   Component Value Date    WBC 19 30 (H) 07/05/2018    HGB 7 3 (L) 07/05/2018    HCT 20 8 (L) 07/05/2018    MCV 99 (H) 07/05/2018     07/05/2018                         Gerson Arellano MD  7/6/2018  6:33 AM

## 2018-07-06 NOTE — PROGRESS NOTES
Progress Note - OB/GYN  Post-Partum Physician Note   Brook Person 40 y o  female MRN: [id number]  Unit/Bed#: 3 337-01 Encounter: [id number]    Subjective/Objective   Chief Complaint: Postpartum    Subjective: Pain is controlled with current analgesics  Medications being used: ibuprofen (OTC)  Eating a regular diet without difficulty  Flatus Yes   Bowel movements are not yet  Voiding without difficulty  Ambulating Yes  Breastfeeding No Lochia minimal     Objective:    Vitals: Blood pressure 141/84, pulse 102, temperature 98 2 °F (36 8 °C), temperature source Oral, resp  rate 18, height 5' 2" (1 575 m), weight 86 2 kg (190 lb), last menstrual period 08/28/2017, SpO2 97 %, not currently breastfeeding  Intake/Output Summary (Last 24 hours) at 07/06/18 0557  Last data filed at 07/05/18 0900   Gross per 24 hour   Intake                0 ml   Output              300 ml   Net             -300 ml       Physical Exam:  GEN: Brook Person appears well, alert and oriented x 3, pleasant and cooperative    HEART: regular rhythm, normal S1 and S2, no murmurs, clicks, gallops or rubs   LUNGS: clear to auscultation bilaterally; no wheezes, rales, or rhonchi   ABDOMEN: normal bowel sounds, soft, no tenderness, no distention  : Uterus firm 2  cm below umbilicus umbilicus non tender, incision clean and dry     EXTREMITIES: peripheral pulses normal; no clubbing, cyanosis, or edema      Labs:   Recent Results (from the past 24 hour(s))   CBC    Collection Time: 07/05/18  6:08 AM   Result Value Ref Range    WBC 11 23 (H) 4 31 - 10 16 Thousand/uL    RBC 1 50 (L) 3 81 - 5 12 Million/uL    Hemoglobin 5 4 (LL) 11 5 - 15 4 g/dL    Hematocrit 15 5 (L) 34 8 - 46 1 %     (H) 82 - 98 fL    MCH 36 0 (H) 26 8 - 34 3 pg    MCHC 34 8 31 4 - 37 4 g/dL    RDW 17 5 (H) 11 6 - 15 1 %    Platelets 405 877 - 210 Thousands/uL    MPV 10 0 8 9 - 12 7 fL   CBC    Collection Time: 07/05/18  4:15 PM   Result Value Ref Range    WBC 19 30 (H) 4 31 - 10 16 Thousand/uL    RBC 2 10 (L) 3 81 - 5 12 Million/uL    Hemoglobin 7 3 (L) 11 5 - 15 4 g/dL    Hematocrit 20 8 (L) 34 8 - 46 1 %    MCV 99 (H) 82 - 98 fL    MCH 34 8 (H) 26 8 - 34 3 pg    MCHC 35 1 31 4 - 37 4 g/dL    RDW 17 5 (H) 11 6 - 15 1 %    Platelets 987 749 - 828 Thousands/uL    MPV 9 6 8 9 - 12 7 fL   Prepare RBC:Special Requirements: None; Has consent been obtained? Yes; Where is the Surgery Scheduled? [facility], 1 Units    Collection Time: 07/06/18  5:55 AM   Result Value Ref Range    Unit Product Code Q9706Z74     Unit Number [id number]     Unit ABO B     Unit RH POS     Unit Dispense Status Presumed Trans        Lab, Imaging and other studies: I have personally reviewed pertinent reports      MEDS:   Current Facility-Administered Medications   Medication Dose Route Frequency    acetaminophen (TYLENOL) tablet 650 mg  650 mg Oral Q6H PRN    aluminum-magnesium hydroxide-simethicone (MYLANTA) 200-200-20 mg/5 mL oral suspension 15 mL  15 mL Oral Q6H PRN    diphenhydrAMINE (BENADRYL) injection 25 mg  25 mg Intravenous Q6H PRN    docusate sodium (COLACE) capsule 100 mg  100 mg Oral BID    ferrous sulfate tablet 325 mg  325 mg Oral Daily With Breakfast    ibuprofen (MOTRIN) tablet 600 mg  600 mg Oral Q6H PRN    metoclopramide (REGLAN) injection 10 mg  10 mg Intravenous Once PRN    ondansetron (ZOFRAN) injection 4 mg  4 mg Intravenous Q8H PRN    oxyCODONE-acetaminophen (PERCOCET) 5-325 mg per tablet 1 tablet  1 tablet Oral Q4H PRN    oxyCODONE-acetaminophen (PERCOCET) 5-325 mg per tablet 2 tablet  2 tablet Oral Q4H PRN    pantoprazole (PROTONIX) EC tablet 40 mg  40 mg Oral Early Morning    senna (SENOKOT) tablet 8 6 mg  1 tablet Oral Daily     Invasive Devices     Peripheral Intravenous Line            Peripheral IV 07/05/18 Right Wrist less than 1 day                  Assessment:  Patient Active Problem List   Diagnosis    Anemia    Positive GBS test    Smoker    Obesity    Elderly multigravida in second trimester    Obesity affecting pregnancy in second trimester    Polyhydramnios in third trimester    24 weeks gestation of pregnancy    S/P emergency        Plan:  1) Postpartum day #  3 status post primary  section  2) Continue routine postpartum care    3) Encourage ambulation  4) Advance diet as tolerated

## 2018-07-07 VITALS
OXYGEN SATURATION: 96 % | RESPIRATION RATE: 18 BRPM | SYSTOLIC BLOOD PRESSURE: 142 MMHG | HEIGHT: 62 IN | BODY MASS INDEX: 34.96 KG/M2 | HEART RATE: 92 BPM | TEMPERATURE: 98.4 F | WEIGHT: 190 LBS | DIASTOLIC BLOOD PRESSURE: 81 MMHG

## 2018-07-07 DIAGNOSIS — G89.18 POST-OP PAIN: Primary | ICD-10-CM

## 2018-07-07 LAB
HCT VFR BLD AUTO: 20.6 % (ref 34.8–46.1)
HGB BLD-MCNC: 7.1 G/DL (ref 11.5–15.4)

## 2018-07-07 PROCEDURE — 85014 HEMATOCRIT: CPT | Performed by: FAMILY MEDICINE

## 2018-07-07 PROCEDURE — 99238 HOSP IP/OBS DSCHRG MGMT 30/<: CPT | Performed by: FAMILY MEDICINE

## 2018-07-07 PROCEDURE — 85018 HEMOGLOBIN: CPT | Performed by: FAMILY MEDICINE

## 2018-07-07 PROCEDURE — 99024 POSTOP FOLLOW-UP VISIT: CPT | Performed by: OBSTETRICS & GYNECOLOGY

## 2018-07-07 RX ORDER — SENNOSIDES 8.6 MG
1 TABLET ORAL
Qty: 7 TABLET | Refills: 0 | Status: SHIPPED | OUTPATIENT
Start: 2018-07-07 | End: 2018-07-26

## 2018-07-07 RX ORDER — OXYCODONE HYDROCHLORIDE AND ACETAMINOPHEN 5; 325 MG/1; MG/1
1 TABLET ORAL 3 TIMES DAILY PRN
Qty: 21 TABLET | Refills: 0 | Status: SHIPPED | OUTPATIENT
Start: 2018-07-07 | End: 2018-07-11 | Stop reason: SDUPTHER

## 2018-07-07 RX ORDER — DOCUSATE SODIUM 100 MG/1
100 CAPSULE, LIQUID FILLED ORAL 2 TIMES DAILY
Qty: 10 CAPSULE | Refills: 0 | Status: SHIPPED | OUTPATIENT
Start: 2018-07-07 | End: 2018-07-19

## 2018-07-07 RX ORDER — ACETAMINOPHEN 325 MG/1
650 TABLET ORAL EVERY 6 HOURS PRN
Qty: 30 TABLET | Refills: 0 | Status: SHIPPED | OUTPATIENT
Start: 2018-07-07 | End: 2018-07-13

## 2018-07-07 RX ORDER — OXYCODONE HYDROCHLORIDE AND ACETAMINOPHEN 5; 325 MG/1; MG/1
1 TABLET ORAL EVERY 4 HOURS PRN
Qty: 10 TABLET | Refills: 0 | Status: CANCELLED | OUTPATIENT
Start: 2018-07-07 | End: 2018-07-12

## 2018-07-07 RX ORDER — FERROUS SULFATE 325(65) MG
325 TABLET ORAL
Qty: 30 TABLET | Refills: 2 | Status: SHIPPED | OUTPATIENT
Start: 2018-07-07 | End: 2018-08-07 | Stop reason: SDUPTHER

## 2018-07-07 RX ORDER — IBUPROFEN 600 MG/1
600 TABLET ORAL EVERY 6 HOURS PRN
Qty: 30 TABLET | Refills: 0 | Status: SHIPPED | OUTPATIENT
Start: 2018-07-07 | End: 2018-08-07

## 2018-07-07 RX ADMIN — IBUPROFEN 600 MG: 600 TABLET ORAL at 08:17

## 2018-07-07 RX ADMIN — DOCUSATE SODIUM 100 MG: 100 CAPSULE, LIQUID FILLED ORAL at 17:52

## 2018-07-07 RX ADMIN — OXYCODONE HYDROCHLORIDE AND ACETAMINOPHEN 1 TABLET: 5; 325 TABLET ORAL at 06:43

## 2018-07-07 RX ADMIN — IBUPROFEN 600 MG: 600 TABLET ORAL at 17:51

## 2018-07-07 RX ADMIN — SENNOSIDES 8.6 MG: 8.6 TABLET ORAL at 08:17

## 2018-07-07 RX ADMIN — OXYCODONE HYDROCHLORIDE AND ACETAMINOPHEN 2 TABLET: 5; 325 TABLET ORAL at 12:32

## 2018-07-07 RX ADMIN — Medication 325 MG: at 08:17

## 2018-07-07 RX ADMIN — OXYCODONE HYDROCHLORIDE AND ACETAMINOPHEN 2 TABLET: 5; 325 TABLET ORAL at 00:51

## 2018-07-07 RX ADMIN — DOCUSATE SODIUM 100 MG: 100 CAPSULE, LIQUID FILLED ORAL at 08:17

## 2018-07-07 RX ADMIN — PANTOPRAZOLE SODIUM 40 MG: 40 TABLET, DELAYED RELEASE ORAL at 06:42

## 2018-07-07 RX ADMIN — OXYCODONE HYDROCHLORIDE AND ACETAMINOPHEN 1 TABLET: 5; 325 TABLET ORAL at 18:23

## 2018-07-07 NOTE — DISCHARGE SUMMARY
Discharge Summary -   Neal Santizo 40 y o  female MRN: [id number]  Unit/Bed#: LD OR  Encounter: [id number]     Admission Date: 7/3/2018      Discharge Date: 18     Attending: Babar Tracey MD     Principal Diagnosis:  pregnancy  Single fetus        Secondary Diagnosis:  pregnancy <37 weeks, Cord Prolapse      Procedures: Emergency  Primary  section     Hospital Course: Patient delivered and underwent normal post delivery care  She is ambulating well, voiding on her own, passing flatus, and tolerating oral intake        Complications:  Patient was transfused 1 unit of PRBC for hemoglobin of 5 4 repeated hemoglobin was 7 3  H&h this morning pending      Condition at discharge: good      Discharge Medications:   For a complete list of the patient's medications, please refer to her medical reconcillation report      Discharge instructions/Information to patient and family:   See after visit summary for information provided to patient and family        Provisions for Follow-Up Care:  See after visit summary for information related to follow-up care and any pertinent home health orders        Disposition: See After Visit Summary for discharge disposition information      Planned Readmission: No     Mat Rollins [id number] PGY3  7/3/2018  11:58 PM     Sonia Zhang PGY-3  [address]  Family Medicine PGY-3

## 2018-07-07 NOTE — PLAN OF CARE
DISCHARGE PLANNING - CARE MANAGEMENT     Discharge to post-acute care or home with appropriate resources Adequate for Discharge        INFECTION - ADULT     Absence or prevention of progression during hospitalization Adequate for Discharge     Absence of fever/infection during neutropenic period Adequate for Discharge        Knowledge Deficit     Patient/family/caregiver demonstrates understanding of disease process, treatment plan, medications, and discharge instructions Adequate for Discharge        PAIN - ADULT     Verbalizes/displays adequate comfort level or baseline comfort level Adequate for Discharge        POSTPARTUM     Experiences normal postpartum course Adequate for Discharge     Appropriate maternal -  bonding Adequate for Discharge     Establishment of infant feeding pattern Adequate for Discharge     Incision(s), wounds(s) or drain site(s) healing without S/S of infection Adequate for Discharge        SAFETY ADULT     Patient will remain free of falls Adequate for Discharge     Maintain or return to baseline ADL function Adequate for Discharge     Maintain or return mobility status to optimal level Adequate for Discharge

## 2018-07-07 NOTE — PROGRESS NOTES
Postpartum Progress Note       PROCEDURE: Primary Low  Delivery    SUBJECTIVE:    Pain: yes   Controled with meds (6/10)     Tolerating Oral Intake: yes     Voiding: yes    Flatus: yes    Bowel Movement: yes    Ambulating: yes    Breastfeeding: no    Chest Pain: no    Shortness of Breath: no    Leg Pain/Discomfort: no    Lochia: minimal        OBJECTIVE:    General: No Acute Distress     Cardiovascular: Regular, Rate and Rhythm, no murmur, gallop or rub     Lungs: Clear to Auscultation Bilaterally, no wheezing, rhonchi or rales     Abdomen: Soft, non-distended, non-tender, no rebound, guarding or CVA tenderness     Fundus: Firm & Non-Tender    Fundal Location:  -2 cm below the umbilicus    Incision: Clean/Dry/Intact, sutures: clean, dry, and intact present     Lower Extremities: Non-Tender      LABS / TESTS / MEDICATION:      Admission on 2018   Component Date Value    WBC 2018 14 76*    RBC 2018 2 42*    Hemoglobin 2018 8 4*    Hematocrit 2018 24 4*    MCV 2018 101*    MCH 2018 34 7*    MCHC 2018 34 4     RDW 2018 16 8*    MPV 2018 10 0     Platelets  249     nRBC 2018 0     Neutrophils Relative 2018 77*    Immat GRANS % 2018 1     Lymphocytes Relative 2018 15     Monocytes Relative 2018 6     Eosinophils Relative 2018 1     Basophils Relative 2018 0     Neutrophils Absolute 2018 11 49*    Immature Grans Absolute 2018 0 12     Lymphocytes Absolute 2018 2 17     Monocytes Absolute 2018 0 81     Eosinophils Absolute 2018 0 15     Basophils Absolute 2018 0 02     Sodium 2018 137     Potassium 2018 3 4*    Chloride 2018 106     CO2 2018 21     Anion Gap 2018 10     BUN 2018 6     Creatinine 2018 0 38*    Glucose 2018 78     Calcium 2018 8 8     AST 2018 8     ALT 2018 9*    Alkaline Phosphatase 07/03/2018 93     Total Protein 07/03/2018 6 8     Albumin 07/03/2018 2 7*    Total Bilirubin 07/03/2018 0 63     eGFR 07/03/2018 137     Creatinine, Ur 07/03/2018 64 5     Microalbum  ,U,Random 07/03/2018 126 0*    Microalb Creat Ratio 07/03/2018 195*    Uric Acid 07/03/2018 5 0     ABO Grouping 07/03/2018 B     Rh Factor 07/03/2018 Positive     Antibody Screen 07/03/2018 Negative     Specimen Expiration Date 07/03/2018 [id number]     RPR 07/03/2018 Non-Reactive     pH, Cord Jaime 07/03/2018 7 271     pCO2, Cord Jaime 07/03/2018 46 9*    pO2, Cord Jaime 07/03/2018 29 7     HCO3, Cord Jaime 07/03/2018 21 1     Base Exc, Cord Jaime 07/03/2018 -6 0*    O2 Cont, Cord Jaime 07/03/2018 16 6     O2 HGB,VENOUS CORD 07/03/2018 67 5     WBC 07/04/2018 24 30*    RBC 07/04/2018 2 11*    Hemoglobin 07/04/2018 7 6*    Hematocrit 07/04/2018 21 4*    MCV 07/04/2018 101*    MCH 07/04/2018 36 0*    MCHC 07/04/2018 35 5     RDW 07/04/2018 17 0*    MPV 07/04/2018 10 3     Platelets 57/20/8720 236     Neutrophils Relative 07/04/2018 71     Immat GRANS % 07/04/2018 1     Lymphocytes Relative 07/04/2018 20     Monocytes Relative 07/04/2018 8     Eosinophils Relative 07/04/2018 0     Basophils Relative 07/04/2018 0     Neutrophils Absolute 07/04/2018 5 99     Immature Grans Absolute 07/04/2018 0 04     Lymphocytes Absolute 07/04/2018 1 71     Monocytes Absolute 07/04/2018 0 65     Eosinophils Absolute 07/04/2018 0 03     Basophils Absolute 07/04/2018 0 02     WBC 07/05/2018 11 23*    RBC 07/05/2018 1 50*    Hemoglobin 07/05/2018 5 4*    Hematocrit 07/05/2018 15 5*    MCV 07/05/2018 103*    MCH 07/05/2018 36 0*    MCHC 07/05/2018 34 8     RDW 07/05/2018 17 5*    Platelets 95/17/6663 180     MPV 07/05/2018 10 0     Unit Product Code 07/06/2018 [id number]     Unit Number 07/06/2018 [id number]     Unit ABO 07/06/2018 B     [facility]     Unit Dispense Status 2018 Presumed Trans     WBC 2018 19 30*    RBC 2018 2 10*    Hemoglobin 2018 7 3*    Hematocrit 2018 20 8*    MCV 2018 99*    MCH 2018 34 8*    MCHC 2018 35 1     RDW 2018 17 5*    Platelets  207     MPV 2018 9 6        Current Facility-Administered Medications   Medication Dose Route Frequency    acetaminophen (TYLENOL) tablet 650 mg  650 mg Oral Q6H PRN    aluminum-magnesium hydroxide-simethicone (MYLANTA) 200-200-20 mg/5 mL oral suspension 15 mL  15 mL Oral Q6H PRN    diphenhydrAMINE (BENADRYL) injection 25 mg  25 mg Intravenous Q6H PRN    docusate sodium (COLACE) capsule 100 mg  100 mg Oral BID    ferrous sulfate tablet 325 mg  325 mg Oral Daily With Breakfast    ibuprofen (MOTRIN) tablet 600 mg  600 mg Oral Q6H PRN    metoclopramide (REGLAN) injection 10 mg  10 mg Intravenous Once PRN    ondansetron (ZOFRAN) injection 4 mg  4 mg Intravenous Q8H PRN    oxyCODONE-acetaminophen (PERCOCET) 5-325 mg per tablet 1 tablet  1 tablet Oral Q4H PRN    oxyCODONE-acetaminophen (PERCOCET) 5-325 mg per tablet 2 tablet  2 tablet Oral Q4H PRN    pantoprazole (PROTONIX) EC tablet 40 mg  40 mg Oral Early Morning    senna (SENOKOT) tablet 8 6 mg  1 tablet Oral Daily         ASSESSMENT AND PLAN:   Post-Operative day # 4  Status post: POD4 Primary   1  Continue Routine Postpartum Care  2  Encourage Ambulation  3  Advance diet as tolerated  4  Likely discharge today   5  Hb  7 6-5 4(1 unit of blood)  Order H&H for today  She continues to be asymptomatic   6   Sent her home with iron and colace       Signature/Title: Connie Ag MD  Date: 2018  Time: 5:52 AM

## 2018-07-07 NOTE — PROGRESS NOTES
Progress Note - OB/GYN   Marii Santizo 40 y o  female MRN: [id number]  Unit/Bed#: CW3 337-01 Encounter: [id number]      [facility] is a patient of [facility]    Assessment:  Post operative day #4 s/p 1LTCS for cord proplapse at 34w5d, stable, and doing well today  Baby is in NICU but may go home with mother today  Plan:  1  Hemodynamically stable   - Pre-op Hb 8 4 --> post-op Hb 7 6 --> 5 4 --> 1u PRBC --> 7 3   - Vitals WNL, currently asymptomatic  2  Continue routine post partum care   - Encourage ambulation   - Encourage breastfeeding  3  Continue current meds   - See list below   - Pain adequately controlled with PO analgesics  4  Disposition   - Stable   - Anticipate discharge home today      Subjective/Objective     Chief Complaint:     Post operative day #4 from a 1LTCS with no complaints today    Subjective:     Pain: no  Tolerating Oral Intake: yes  Voiding: yes  Flatus: yes  Bowel Movement: yes  Ambulating: yes  Breastfeeding: Using breast pump  Chest Pain: no  Shortness of Breath: no  Leg Pain/Discomfort: no  Lochia: minimal    Objective:   Vitals: /60   Pulse 94   Temp 98 4 °F (36 9 °C) (Oral)   Resp 18   Ht 5' 2" (1 575 m)   Wt 86 2 kg (190 lb)   LMP 08/28/2017 (Within Days)   SpO2 96%   Breastfeeding?  No   BMI 34 75 kg/m²     No intake or output data in the 24 hours ending 07/07/18 0600    Lab Results   Component Value Date    WBC 19 30 (H) 07/05/2018    HGB 7 3 (L) 07/05/2018    HCT 20 8 (L) 07/05/2018    MCV 99 (H) 07/05/2018     07/05/2018       Meds/Allergies   Current Facility-Administered Medications   Medication Dose Route Frequency    acetaminophen (TYLENOL) tablet 650 mg  650 mg Oral Q6H PRN    aluminum-magnesium hydroxide-simethicone (MYLANTA) 200-200-20 mg/5 mL oral suspension 15 mL  15 mL Oral Q6H PRN    diphenhydrAMINE (BENADRYL) injection 25 mg  25 mg Intravenous Q6H PRN    docusate sodium (COLACE) capsule 100 mg  100 mg Oral BID    ferrous sulfate tablet 325 mg  325 mg Oral Daily With Breakfast    ibuprofen (MOTRIN) tablet 600 mg  600 mg Oral Q6H PRN    metoclopramide (REGLAN) injection 10 mg  10 mg Intravenous Once PRN    ondansetron (ZOFRAN) injection 4 mg  4 mg Intravenous Q8H PRN    oxyCODONE-acetaminophen (PERCOCET) 5-325 mg per tablet 1 tablet  1 tablet Oral Q4H PRN    oxyCODONE-acetaminophen (PERCOCET) 5-325 mg per tablet 2 tablet  2 tablet Oral Q4H PRN    pantoprazole (PROTONIX) EC tablet 40 mg  40 mg Oral Early Morning    senna (SENOKOT) tablet 8 6 mg  1 tablet Oral Daily       Physical Exam:  General: in no apparent distress, well developed and well nourished and non-toxic  Cardiovascular: Cor RRR  Lungs: clear to auscultation bilaterally  Abdomen: abdomen is soft without significant tenderness, masses, organomegaly or guarding; incision c/d/i closed with running absorbable suture  Fundus: Firm and non-tender, 2 cm below the umbilicus  Lower extremeties: nontender    Labs/Tests:   No results found for this or any previous visit (from the past 24 hour(s))      MEDS:   Current Facility-Administered Medications   Medication Dose Route Frequency    acetaminophen (TYLENOL) tablet 650 mg  650 mg Oral Q6H PRN    aluminum-magnesium hydroxide-simethicone (MYLANTA) 200-200-20 mg/5 mL oral suspension 15 mL  15 mL Oral Q6H PRN    diphenhydrAMINE (BENADRYL) injection 25 mg  25 mg Intravenous Q6H PRN    docusate sodium (COLACE) capsule 100 mg  100 mg Oral BID    ferrous sulfate tablet 325 mg  325 mg Oral Daily With Breakfast    ibuprofen (MOTRIN) tablet 600 mg  600 mg Oral Q6H PRN    metoclopramide (REGLAN) injection 10 mg  10 mg Intravenous Once PRN    ondansetron (ZOFRAN) injection 4 mg  4 mg Intravenous Q8H PRN    oxyCODONE-acetaminophen (PERCOCET) 5-325 mg per tablet 1 tablet  1 tablet Oral Q4H PRN    oxyCODONE-acetaminophen (PERCOCET) 5-325 mg per tablet 2 tablet  2 tablet Oral Q4H PRN    pantoprazole (PROTONIX) EC tablet 40 mg  40 mg Oral Early Morning    senna (SENOKOT) tablet 8 6 mg  1 tablet Oral Daily     Invasive Devices          No matching active lines, drains, or airways        Teresa Magallanes DO  PGY-2 OB/GYN   7/7/2018 6:00 AM

## 2018-07-09 ENCOUNTER — TRANSITIONAL CARE MANAGEMENT (OUTPATIENT)
Dept: FAMILY MEDICINE CLINIC | Facility: CLINIC | Age: 38
End: 2018-07-09

## 2018-07-09 NOTE — CASE MANAGEMENT
Notification of Maternity Inpatient Admission/Maternity Inpatient Authorization Request  This is a Notification of Maternity Inpatient Admission/Maternity Inpatient Authorization Request to our facility [facility]  Please be advised that this patient is currently in our facility under Inpatient Status  Below you will find the Birth/ Summary, Attending Physician and Facilitys information including NPI# and contact for the Utilization  assigned to the [facility] where the patient is receiving services  Please feel free to contact the Utilization Review Department with any questions  Mothers Information:  Boo Sharma  MRN: [id number]  YOB: 1980  Admission Date: 7/3/2018  8:58 PM  Discharge Date: 2018  6:55 PM  Disposition: Home/Self Care  Admitting Diagnosis: O82  [place] Information:  Estimated Date of Delivery: 18  Information for the patient's :  Clotilde Houstonavani Girl  Preetkaiden Tony) [[id number]]      Delivery Information:  Sex: female  Delivered 7/3/2018 10:58 PM by , Low Vertical; Gestational Age: 35w7d    [place] Measurements:  Weight: 5 lb 1 1 oz (2300 g); Height: 42 3"    APGAR 1 minute 5 minutes 10 minutes   Totals: 8 9      OB History      Para Term  AB Living    4 3 2 1 1 3    SAB TAB Ectopic Multiple Live Births    0 0 0 0 1        Attending Physician:      [address]  [phone]  Tax ID: [id number]  NPI: [id number]    [facility] in the [facility] by Alberto Saenz for 2017  Network Utilization Review Department  Phone: [phone]; Fax [phone]  ATTENTION: The Network Utilization Review Department is now centralized for our 7 Facilities  Make a note that we have a new phone and fax numbers for our Department   Please call with any questions or concerns to [phone] and carefully follow the prompts so that you are directed to the right person  All voicemails are confidential  Fax any determinations, approvals, denials, and requests for initial or continue stay review clinical to [phone]  Due to HIGH CALL volume, it would be easier if you could please send faxed requests to expedite your requests and in part, help us provide discharge notifications faster

## 2018-07-09 NOTE — CASE MANAGEMENT
Notification of Discharge  This is a Notification of Discharge from our facility [address]  Please be advised that this patient has been discharge from our facility  Below you will find the admission and discharge date and time including the patients disposition  PRESENTATION DATE: 7/3/2018  8:58 PM  IP ADMISSION DATE: 7/3/18 2217  DISCHARGE DATE: 7/7/2018  6:55 PM  DISPOSITION: Home/Self Care    [address]  [facility] in the [facility] Utilization Review Department  Phone: [phone]; Fax [phone]  ATTENTION: The Network Utilization Review Department is now centralized for our 9 Facilities  Make a note that we have a new phone and fax numbers for our Department  Please call with any questions or concerns to [phone] and carefully follow the prompts so that you are directed to the right person  All voicemails are confidential  Fax any determinations, approvals, denials, and requests for initial or continue stay review clinical to [phone]  Due to HIGH CALL volume, it would be easier if you could please send faxed requests to expedite your requests and in part, help us provide discharge notifications faster

## 2018-07-11 ENCOUNTER — TELEPHONE (OUTPATIENT)
Dept: FAMILY MEDICINE CLINIC | Facility: CLINIC | Age: 38
End: 2018-07-11

## 2018-07-11 DIAGNOSIS — G89.18 POST-OP PAIN: ICD-10-CM

## 2018-07-11 RX ORDER — OXYCODONE HYDROCHLORIDE AND ACETAMINOPHEN 5; 325 MG/1; MG/1
1 TABLET ORAL 3 TIMES DAILY PRN
Qty: 10 TABLET | Refills: 0 | Status: SHIPPED | OUTPATIENT
Start: 2018-07-11 | End: 2018-07-11 | Stop reason: SDUPTHER

## 2018-07-11 RX ORDER — OXYCODONE HYDROCHLORIDE AND ACETAMINOPHEN 5; 325 MG/1; MG/1
1 TABLET ORAL 3 TIMES DAILY PRN
Qty: 10 TABLET | Refills: 0 | Status: SHIPPED | OUTPATIENT
Start: 2018-07-11 | End: 2018-07-14

## 2018-07-11 NOTE — TELEPHONE ENCOUNTER
Pt had C section on 7/3/18  She was prescribed Oxycodone Acetaminaphen on 7/6 for pain  [facility] told her her insurance would only allow 5days worth (15pills out of 21 prescribed))  They said to call Dr to have more authorized  Please call pt,she is out and still having pain  She is scheduled for postpartum on 7/26 with Dr Rajiv Sawant (she had told her to follow up at 3wks after delivery)

## 2018-07-12 NOTE — TELEPHONE ENCOUNTER
I had sent it electronically earlier when I realized it had printed but when I tried to verify it said that transmission had failed - I sent it again just in case  Thanks

## 2018-07-12 NOTE — TELEPHONE ENCOUNTER
Actually, for the second time, It said that transmission to the pharmacy failed  I do not know how to correct that error other than contacting the pharmacy to see if they got it and if not, giving her a printed Rx

## 2018-07-12 NOTE — TELEPHONE ENCOUNTER
Urgent prior auth submitted to [employer] @ [id number]  We can call [phone] to follow up

## 2018-07-12 NOTE — TELEPHONE ENCOUNTER
Pharmacy does have the prescription we need to do the prior auth form is being sent I will complete it upon arrival

## 2018-07-13 ENCOUNTER — OFFICE VISIT (OUTPATIENT)
Dept: OBGYN CLINIC | Facility: HOSPITAL | Age: 38
End: 2018-07-13
Payer: COMMERCIAL

## 2018-07-13 VITALS — SYSTOLIC BLOOD PRESSURE: 147 MMHG | WEIGHT: 181.4 LBS | BODY MASS INDEX: 33.18 KG/M2 | DIASTOLIC BLOOD PRESSURE: 95 MMHG

## 2018-07-13 DIAGNOSIS — Z98.891 S/P EMERGENCY C-SECTION: Primary | ICD-10-CM

## 2018-07-13 DIAGNOSIS — Z09 POSTOPERATIVE EXAMINATION: ICD-10-CM

## 2018-07-13 DIAGNOSIS — I10 HYPERTENSION, UNSPECIFIED TYPE: ICD-10-CM

## 2018-07-13 LAB
SL AMB  POCT GLUCOSE, UA: ABNORMAL
SL AMB LEUKOCYTE ESTERASE,UA: ABNORMAL
SL AMB POCT BILIRUBIN,UA: ABNORMAL
SL AMB POCT BLOOD,UA: ABNORMAL
SL AMB POCT CLARITY,UA: CLEAR
SL AMB POCT COLOR,UA: ABNORMAL
SL AMB POCT KETONES,UA: ABNORMAL
SL AMB POCT NITRITE,UA: ABNORMAL
SL AMB POCT PH,UA: 6
SL AMB POCT SPECIFIC GRAVITY,UA: 1.02
SL AMB POCT URINE PROTEIN: ABNORMAL
SL AMB POCT UROBILINOGEN: 0.2

## 2018-07-13 PROCEDURE — 99024 POSTOP FOLLOW-UP VISIT: CPT | Performed by: OBSTETRICS & GYNECOLOGY

## 2018-07-13 NOTE — PROGRESS NOTES
Price Jarvis is a 40 y o  female who presents to the clinic 1 weeks status post emergency  section for prolapsed cord  Eating a regular diet without difficulty  Bowel movements are normal  Pain is controlled with current analgesics  Medications being used: acetaminophen and ibuprofen (OTC)  Denies fevers, chills, night sweats  Denies purulent or serosanguinous drainage from incision site  Reports baby is doing well and is working on feeding  She does have elevated blood pressures upon arrival today  Denies HA, CP, SOB, RUQ pain  Endorses modest increase in lower extremity swelling bilaterally  The following portions of the patient's history were reviewed and updated as appropriate: allergies, current medications, past family history, past medical history, past social history, past surgical history and problem list     Review of Systems  Pertinent items are noted in HPI  Objective     /100   Wt 82 3 kg (181 lb 6 4 oz)   LMP 2017 (Within Days)   BMI 33 18 kg/m²   General:  alert and oriented, in no acute distress   Abdomen: soft, bowel sounds active, mildly tender to deep palpation on right lower quadrant, otherwise nontender, nondistended   Incision:   healing well, no erythema, no hernia, no seroma, no swelling, no dehiscence, incision well approximated  No purulent or serosanguinous discharge, however, incision does appear moist         Assessment   38yo s/p emergency  section at 34wga for a prolapsed cord on 7/3/18     Plan     1  Continue any current medications  Pt reports Tylenol and ibuprofen are working well for main management at this time  2  Wound care discussed  Discussed importance of keeping incision site clean and dry  3  Activity restrictions: no lifting more than 25 pounds  4  Anticipated return to work: 4-6 weeks  5  Follow up: 2 weeks for routine postpartum appointment      6  Elevated blood pressures upon arrival: UDip in office 2+ protein  Repeat /88  Discussed with Dr Kulwinder Valdez; pt asymptomatic at this time  Will return on Tuesday or Wednesday of next week for repeat blood pressure reading  Discussed signs and symptoms of preeclampsia with precautions to return sooner as needed  Pt demonstrated understanding        Barbara Perez DO  OB/GYN, PGY3  7/13/2018, 10:23 AM

## 2018-07-19 ENCOUNTER — OFFICE VISIT (OUTPATIENT)
Dept: OBGYN CLINIC | Facility: HOSPITAL | Age: 38
End: 2018-07-19
Payer: COMMERCIAL

## 2018-07-19 VITALS
HEIGHT: 62 IN | BODY MASS INDEX: 33.13 KG/M2 | SYSTOLIC BLOOD PRESSURE: 179 MMHG | WEIGHT: 180 LBS | DIASTOLIC BLOOD PRESSURE: 96 MMHG | HEART RATE: 112 BPM

## 2018-07-19 PROBLEM — F17.200 SMOKER: Status: RESOLVED | Noted: 2018-03-06 | Resolved: 2018-07-19

## 2018-07-19 PROBLEM — Z98.891 S/P EMERGENCY C-SECTION: Status: RESOLVED | Noted: 2018-07-03 | Resolved: 2018-07-19

## 2018-07-19 PROBLEM — Z3A.24 24 WEEKS GESTATION OF PREGNANCY: Status: RESOLVED | Noted: 2018-06-26 | Resolved: 2018-07-19

## 2018-07-19 PROBLEM — O09.522 ELDERLY MULTIGRAVIDA IN SECOND TRIMESTER: Status: RESOLVED | Noted: 2018-04-04 | Resolved: 2018-07-19

## 2018-07-19 PROBLEM — O02.1 ABORTION, MISSED: Status: ACTIVE | Noted: 2017-10-12

## 2018-07-19 PROBLEM — O99.212 OBESITY AFFECTING PREGNANCY IN SECOND TRIMESTER: Status: RESOLVED | Noted: 2018-04-04 | Resolved: 2018-07-19

## 2018-07-19 PROBLEM — O40.3XX0 POLYHYDRAMNIOS IN THIRD TRIMESTER: Status: RESOLVED | Noted: 2018-05-18 | Resolved: 2018-07-19

## 2018-07-19 PROCEDURE — 99024 POSTOP FOLLOW-UP VISIT: CPT | Performed by: OBSTETRICS & GYNECOLOGY

## 2018-07-19 RX ORDER — NIFEDIPINE 30 MG/1
30 TABLET, EXTENDED RELEASE ORAL DAILY
Qty: 30 TABLET | Refills: 0 | Status: SHIPPED | OUTPATIENT
Start: 2018-07-19 | End: 2018-08-07 | Stop reason: SDUPTHER

## 2018-07-19 NOTE — PROGRESS NOTES
History of Present Illness:  Joi Bedoya is a 40 y o  female who presents to the clinic  2 weeks status post emergency  section for prolapsed cord on 7/3/2018 for blood pressure recheck and incision check  She had elevated blood pressure last week in clinic: 158/100  On arrival today, her blood pressure was 179/96 and on recheck was 157/93  She denies headache, chest pain, shortness of breath, dizziness, nausea vomiting, and right upper quadrant pain  She has a history of preeclampsia in a previous pregnancy in 2016 requiring early delivery but no medication  She endorses some pain in her incision site on the right side but the pain is controlled with OTC medications  She also reports "draining" from her incision of yellowish fluid mostly on the right side when she has the incision padded  She denies fevers and chills  She has reports diarrhea for the past 2 weeks since hospital discharge  She has approximately 4 small episodes of diarrhea daily  She discontinued her stool softeners 2 days ago  She endorses regular diet  She has no difficulties voiding  Her pain is well controlled with current analgesics: Ibuprofen and Tylenol  Baby girl is doing well and was discharged from NICU on  (7/15/2018)  She is bottle-feeding  The following portions of the patient's history were reviewed and updated as appropriate: allergies, current medications, past family history, past medical history, past social history, past surgical history and problem list      Review of Systems  Pertinent items are noted in HPI       Objective     Vitals:    18 1510   BP: (!) 179/96   Pulse: (!) 112       General:  alert and oriented, in no acute distress   Abdomen: soft, bowel sounds active, mildly tender to deep palpation on right lower quadrant, otherwise nontender, nondistended   Incision:   healing well, small 1cm area of erythema on the right side, no hernia, no seroma, no swelling, no dehiscence, incision well approximated  No purulent or serosanguinous discharge seen on exam Incision does not appear moist           Assessment   38yo s/p emergency  section at 34wga for a prolapsed cord on 7/3/18 with elevated blood pressure, healing incision, diarrhea, and doing well postpartum  Plan    1  Elevated Blood pressure in post partum period  - Patient has continued elevated blood pressures and therefore will begin Procardia 30mg XL   - Return next week for blood pressure check  - Instructed to take blood pressures at home and call for blood pressures >811 systolic or >703 diastolic    - Will not order preeclampsia labs at this time as results would not change current management  - Plan to recheck next week    2  Incision  - Discussed continued importance of keeping incision site dry and clean  No evidence of infection at this time  - Continue any current medications  Pt reports Tylenol and ibuprofen are working well for main management at this time  - Activity restrictions: no lifting more than 25 pounds    3  Diarrhea:  - Discussed discontinuation of stool softeners  - Follow up with PCP if diarrhea persists  4  Postpartum  - Continue routine postpartum care  - Anticipated return to work: 4-6 weeks  - Will follow up for routine postpartum appointment  Appointment will be scheduled after blood pressure issues resolve  Patient has requested to be seen here rather than with her family practice

## 2018-07-26 ENCOUNTER — OFFICE VISIT (OUTPATIENT)
Dept: OBGYN CLINIC | Facility: HOSPITAL | Age: 38
End: 2018-07-26
Payer: COMMERCIAL

## 2018-07-26 VITALS
DIASTOLIC BLOOD PRESSURE: 101 MMHG | BODY MASS INDEX: 32.87 KG/M2 | WEIGHT: 178.6 LBS | HEIGHT: 62 IN | HEART RATE: 89 BPM | SYSTOLIC BLOOD PRESSURE: 145 MMHG

## 2018-07-26 DIAGNOSIS — R39.89 POSSIBLE URINARY TRACT INFECTION: ICD-10-CM

## 2018-07-26 LAB
SL AMB  POCT GLUCOSE, UA: ABNORMAL
SL AMB LEUKOCYTE ESTERASE,UA: ABNORMAL
SL AMB POCT BILIRUBIN,UA: ABNORMAL
SL AMB POCT BLOOD,UA: ABNORMAL
SL AMB POCT CLARITY,UA: CLEAR
SL AMB POCT COLOR,UA: YELLOW
SL AMB POCT KETONES,UA: ABNORMAL
SL AMB POCT NITRITE,UA: ABNORMAL
SL AMB POCT PH,UA: 6.5
SL AMB POCT SPECIFIC GRAVITY,UA: 1.02
SL AMB POCT URINE PROTEIN: ABNORMAL
SL AMB POCT UROBILINOGEN: 0.2

## 2018-07-26 PROCEDURE — 99214 OFFICE O/P EST MOD 30 MIN: CPT | Performed by: OBSTETRICS & GYNECOLOGY

## 2018-07-26 PROCEDURE — 81002 URINALYSIS NONAUTO W/O SCOPE: CPT | Performed by: OBSTETRICS & GYNECOLOGY

## 2018-07-26 RX ORDER — ACETAMINOPHEN 325 MG/1
650 TABLET ORAL EVERY 6 HOURS PRN
COMMUNITY
End: 2018-08-07

## 2018-07-26 NOTE — PATIENT INSTRUCTIONS
Preeclampsia and Eclampsia After Delivery   WHAT YOU NEED TO KNOW:   What are preeclampsia and eclampsia? Preeclampsia is high blood pressure during pregnancy  You may also have protein in your urine  Preeclampsia can lead to eclampsia, a condition that causes one or more seizures  These conditions usually occur during pregnancy, but they can also occur up to 6 weeks after delivery  The risk is highest within a week after delivery  How are preeclampsia and eclampsia treated? Medicines may be given to lower your blood pressure, protect your organs, or prevent seizures  What do I need to know if I had preeclampsia or eclampsia during my pregnancy? Most of the time, preeclampsia and eclampsia go away after you deliver  You may continue to have symptoms for a period of time after you deliver  If you had severe preeclampsia during pregnancy, you may  need to do any of the following:  · See your healthcare provider several times during the week after delivery  He may check your blood pressure and order other tests  Your healthcare provider may ask you to check your blood pressure at home  · Continue to take certain medicines  for up to 6 weeks after delivery  Call 911 for the following: You have a seizure  When should I seek care immediately? · You develop a severe headache that does not go away  · You have new or increased vision changes, such as blurred or spotted vision  · You have new or increased swelling in your face or hands  · You have severe abdominal pain with nausea and vomiting  When should I contact my healthcare provider? · Your blood pressure is higher than you were told it should be  · You have questions or concerns about your condition or care  CARE AGREEMENT:   You have the right to help plan your care  Learn about your health condition and how it may be treated  Discuss treatment options with your caregivers to decide what care you want to receive   You always have the right to refuse treatment  The above information is an  only  It is not intended as medical advice for individual conditions or treatments  Talk to your doctor, nurse or pharmacist before following any medical regimen to see if it is safe and effective for you  © 2017 [address] [employer] Information is for End User's use only and may not be sold, redistributed or otherwise used for commercial purposes  All illustrations and images included in CareNotes® are the copyrighted property of A D [employer] M [employer]  or Alberto Saenz

## 2018-07-26 NOTE — PROGRESS NOTES
History of Present Illness:  Pratik Hart is a 40 y o  female who presents to the clinic 3 weeks status post emergency  section for prolapsed cord on 7/3/2018 for blood pressure recheck and incision check  Blood Pressure: She had elevated blood pressure last week in clinic: 179/96 and 157/93 and was started on Procardia 30 XL  She began taking her Procardia on Saturday and reports having taken it this morning  On arrival today, her blood pressure was 155/101 on the right and 151/102 on the left and on recheck was 145/101  She denies  headache, chest pain, shortness of breath, dizziness, nausea vomiting, and right upper quadrant pain  She has a history of preeclampsia in a previous pregnancy in 2016 requiring early delivery but required no medication  Incision: Patient reports yellow fluid on pad that she keeps over incision  She was advised to keep the incision dry by using a pad  She reports stabbing pain on the right side radiating from her incision  She said that the pain is worse at night  Postpartum: Patient reports a regular diet  Patient endorses decreased appetite  Patient denies nausea and vomiting  Patient is voiding without difficulty  She endorses regular bowel movements  Patient is bottle feeding her   She is taking Tylenol and Ibuprofen for her postoperative pain but reports that her pain is no longer well controlled with this regimen  Dysuria:  Patient reports stabbing pain at end of urination  She denies urinary frequency, urgency, or hematuria       Review of Systems  Review of Systems   Constitution: Positive for decreased appetite, malaise/fatigue and weight loss  Negative for chills and fever  Eyes: Negative for blurred vision and double vision  Cardiovascular: Negative for chest pain  Respiratory: Negative for shortness of breath  Gastrointestinal: Positive for abdominal pain  Negative for change in bowel habit, bowel incontinence, nausea and vomiting  Genitourinary: Positive for dysuria  Negative for bladder incontinence, flank pain, frequency, hematuria, hesitancy, incomplete emptying, non-menstrual bleeding, pelvic pain and urgency  Objective  Vitals:    18 1417   BP: (!) 151/102   Pulse: 89         Physical Exam   Constitutional: She appears well-developed and well-nourished  No distress  Pulmonary/Chest: Effort normal    Abdominal: Soft  She exhibits no distension  There is tenderness  There is no guarding  Appropriate tenderness to palpation over incision area  No evidence of infection, erythema, or bleeding  No evidence of discharge at this time  Incision is close; no dehiscence or evidence of collection of fluid  Small area of incisional puckering on right side but closed  Skin: Skin is warm and dry  Psychiatric: She has a normal mood and affect  Her behavior is normal  Judgment and thought content normal           Assessment   36yo [id number] 3 weeks s/p emergency  section at 35wga for a prolapsed cord on 7/3/18 with elevated blood pressure and painful incision  Plan    1  Elevated Blood pressure in post partum period  - Increased Procardia 30mg XL BID  - Return Monday for blood pressure check  - Instructed to take blood pressures at home and call for blood pressures >597 systolic or >318 diastolic    - UA revealed gross protein  - CBC and CMP ordered  Will follow-up results  - Concern at this time for preeclampsia  2  Incision Closed  - Discussed continued importance of keeping incision site dry and clean  No evidence of infection at this time  - Continue any current medications  Reminded to take Tylenol and Motrin scheduled  Motrin 600mg    - Activity restrictions: no lifting more than 25 pounds    3  Dysuria  - UA showed no evidence of infection  4  Postpartum  - Continue routine postpartum care  - Anticipated return to work: 3-4 weeks  - Will follow up for routine postpartum appointment   Appointment will be scheduled after blood pressure issues resolve  Patient has requested to be seen here rather than with her family practice

## 2018-07-30 ENCOUNTER — APPOINTMENT (OUTPATIENT)
Dept: LAB | Facility: MEDICAL CENTER | Age: 38
End: 2018-07-30
Payer: COMMERCIAL

## 2018-07-30 PROCEDURE — 85027 COMPLETE CBC AUTOMATED: CPT

## 2018-07-30 PROCEDURE — 36415 COLL VENOUS BLD VENIPUNCTURE: CPT

## 2018-07-30 PROCEDURE — 80053 COMPREHEN METABOLIC PANEL: CPT

## 2018-07-31 LAB
ALBUMIN SERPL BCP-MCNC: 3.6 G/DL (ref 3.5–5)
ALP SERPL-CCNC: 93 U/L (ref 46–116)
ALT SERPL W P-5'-P-CCNC: 15 U/L (ref 12–78)
ANION GAP SERPL CALCULATED.3IONS-SCNC: 7 MMOL/L (ref 4–13)
AST SERPL W P-5'-P-CCNC: 11 U/L (ref 5–45)
BILIRUB SERPL-MCNC: 0.75 MG/DL (ref 0.2–1)
BUN SERPL-MCNC: 9 MG/DL (ref 5–25)
CALCIUM SERPL-MCNC: 8.8 MG/DL (ref 8.3–10.1)
CHLORIDE SERPL-SCNC: 104 MMOL/L (ref 100–108)
CO2 SERPL-SCNC: 28 MMOL/L (ref 21–32)
CREAT SERPL-MCNC: 0.51 MG/DL (ref 0.6–1.3)
ERYTHROCYTE [DISTWIDTH] IN BLOOD BY AUTOMATED COUNT: 15.7 % (ref 11.6–15.1)
GFR SERPL CREATININE-BSD FRML MDRD: 124 ML/MIN/1.73SQ M
GLUCOSE SERPL-MCNC: 90 MG/DL (ref 65–140)
HCT VFR BLD AUTO: 31.4 % (ref 34.8–46.1)
HGB BLD-MCNC: 10.7 G/DL (ref 11.5–15.4)
MCH RBC QN AUTO: 32.2 PG (ref 26.8–34.3)
MCHC RBC AUTO-ENTMCNC: 34.1 G/DL (ref 31.4–37.4)
MCV RBC AUTO: 95 FL (ref 82–98)
PLATELET # BLD AUTO: 296 THOUSANDS/UL (ref 149–390)
PMV BLD AUTO: 10.7 FL (ref 8.9–12.7)
POTASSIUM SERPL-SCNC: 4.6 MMOL/L (ref 3.5–5.3)
PROT SERPL-MCNC: 7.2 G/DL (ref 6.4–8.2)
RBC # BLD AUTO: 3.32 MILLION/UL (ref 3.81–5.12)
SODIUM SERPL-SCNC: 139 MMOL/L (ref 136–145)
WBC # BLD AUTO: 8.64 THOUSAND/UL (ref 4.31–10.16)

## 2018-08-07 ENCOUNTER — OFFICE VISIT (OUTPATIENT)
Dept: OBGYN CLINIC | Facility: HOSPITAL | Age: 38
End: 2018-08-07
Payer: COMMERCIAL

## 2018-08-07 VITALS
HEIGHT: 62 IN | HEART RATE: 85 BPM | WEIGHT: 177 LBS | SYSTOLIC BLOOD PRESSURE: 127 MMHG | DIASTOLIC BLOOD PRESSURE: 85 MMHG | BODY MASS INDEX: 32.57 KG/M2

## 2018-08-07 DIAGNOSIS — Z30.013 ENCOUNTER FOR INITIAL PRESCRIPTION OF INJECTABLE CONTRACEPTIVE: Primary | ICD-10-CM

## 2018-08-07 DIAGNOSIS — D50.9 IRON DEFICIENCY ANEMIA, UNSPECIFIED IRON DEFICIENCY ANEMIA TYPE: ICD-10-CM

## 2018-08-07 PROCEDURE — 99212 OFFICE O/P EST SF 10 MIN: CPT | Performed by: OBSTETRICS & GYNECOLOGY

## 2018-08-07 RX ORDER — FERROUS SULFATE 325(65) MG
325 TABLET ORAL
Qty: 30 TABLET | Refills: 0 | Status: SHIPPED | OUTPATIENT
Start: 2018-08-07 | End: 2019-10-16

## 2018-08-07 RX ORDER — NIFEDIPINE 30 MG/1
60 TABLET, EXTENDED RELEASE ORAL DAILY
Qty: 60 TABLET | Refills: 0 | Status: SHIPPED | OUTPATIENT
Start: 2018-08-07 | End: 2019-10-16

## 2018-08-07 RX ORDER — NIFEDIPINE 30 MG/1
30 TABLET, FILM COATED, EXTENDED RELEASE ORAL DAILY
Refills: 0 | COMMUNITY
Start: 2018-07-20 | End: 2018-08-07

## 2018-08-07 RX ORDER — MEDROXYPROGESTERONE ACETATE 150 MG/ML
150 INJECTION, SUSPENSION INTRAMUSCULAR
Qty: 1 ML | Refills: 3 | Status: SHIPPED | OUTPATIENT
Start: 2018-08-07 | End: 2019-10-16

## 2018-08-07 NOTE — PATIENT INSTRUCTIONS
Medroxyprogesterone (By injection)   Medroxyprogesterone ([id number]-dt-proe-ERICA-ter-one)  Prevents pregnancy  Also treats endometriosis and is used with other medicines to help relieve symptoms of cancer, including uterine or kidney cancer  Brand Name(s): Depo-Provera, Depo-Provera Contraceptive, Depo-SubQ Provera 104   There may be other brand names for this medicine  When This Medicine Should Not Be Used: This medicine is not right for everyone  You should not receive it if you had an allergic reaction to medroxyprogesterone or if you have a history of breast cancer or blood clots (including heart attack or stroke)  In most cases, you should not use this medicine while you are pregnant  How to Use This Medicine:   Injectable  · A nurse or other health provider will give you this medicine  This medicine is given as a shot into a muscle or just under the skin  · Your exact treatment schedule depends on the reason you are using this medicine  You doctor will explain your personal schedule  ¨ For treatment of cancer symptoms, you may start with a shot once per week  You may need fewer shots as your treatment goes forward  ¨ For birth control or endometriosis, you will need a shot every 3 months (13 weeks)  Read and follow the patient instructions that come with this medicine  Talk to your doctor or pharmacist if you have any questions  ¨ You might need to have the first shot during the first 5 days of your normal menstrual period, to make sure you are not pregnant  If you have just had a baby, you may receive a shot 5 days after birth if you are not breastfeeding or 6 weeks after birth if you are breastfeeding  · Missed dose: You must receive a shot every 3 months if you want to prevent pregnancy  Talk to your doctor or pharmacist if you do not receive your medicine on time, because you may need another form of birth control    Drugs and Foods to Avoid:   Ask your doctor or pharmacist before using any other medicine, including over-the-counter medicines, vitamins, and herbal products  · Some foods and medicines can affect how medroxyprogesterone works  Tell your doctor if you are using any of the following:  ¨ Aminoglutethimide, bosentan, carbamazepine, felbamate, griseofulvin, nefazodone, oxcarbazepine, phenobarbital, phenytoin, rifabutin, rifampin, rifapentine, Mariel's wort, topiramate  ¨ Medicine to treat an infection (including clarithromycin, itraconazole, ketoconazole, telithromycin, voriconazole)  ¨ Medicine to treat HIV/AIDS (including atazanavir, indinavir, nelfinavir, ritonavir, saquinavir)  Warnings While Using This Medicine:   · Tell your doctor right away if you think you have become pregnant  · Tell your doctor if you are breastfeeding or if you have liver disease, kidney disease, asthma, diabetes, heart disease, seizures, migraine headaches, an eating disorder, osteoporosis, or a history of depression  Tell your doctor if you smoke  · This medicine may cause the following problems:  ¨ Blood clots, which could lead to stroke, heart attack, or other serious problems  ¨ Possible increased risk of breast cancer  ¨ Weak or thin bones, especially with long-term use  · You should not use this medicine for long-term birth control unless you cannot use any other form of birth control  · This medicine will not protect you from HIV/AIDS or other sexually transmitted diseases  · Tell any doctor or dentist who treats you that you are using this medicine  This medicine may affect certain medical test results  · Your doctor will check your progress and the effects of this medicine at regular visits  Keep all appointments    Possible Side Effects While Using This Medicine:   Call your doctor right away if you notice any of these side effects:  · Allergic reaction: Itching or hives, swelling in your face or hands, swelling or tingling in your mouth or throat, chest tightness, trouble breathing  · Chest pain, trouble breathing, or coughing up blood  · Dark urine or pale stools, nausea, vomiting, loss of appetite, stomach pain, yellow skin or eyes  · Heavy or nonstop vaginal bleeding  · Loss of vision, double vision  · Numbness or weakness on one side of your body, sudden or severe headache, problems with vision, speech, or walking  · Severe stomach pain or cramps  If you notice these less serious side effects, talk with your doctor:   · Headache  · Light or missed monthly periods, spotting between periods  · Nervousness or dizziness  · Pain, redness, burning, swelling, or a lump under your skin where the shot was given  · Weight gain  If you notice other side effects that you think are caused by this medicine, tell your doctor  Call your doctor for medical advice about side effects  You may report side effects to FDA at 3-405-FDA-0373  © 2017 [employer] Information is for End User's use only and may not be sold, redistributed or otherwise used for commercial purposes  The above information is an  only  It is not intended as medical advice for individual conditions or treatments  Talk to your doctor, nurse or pharmacist before following any medical regimen to see if it is safe and effective for you

## 2018-08-07 NOTE — PROGRESS NOTES
Subjective:     Pratik Hart is a 40 y o  [id number] female now 4 weeks s/p emergency  section for prolapsed cord on 7/3/2018  She presents for blood pressure recheck  She was found to have elevated blood pressures of 150s-170s/90s during her incision check on  and was started on procardia 30mg XL  However, she continued to have elevated blood pressures 150s/100s on  and was increased to procardia XL 30mg Q12hr  Today, her BP is 127/85  She is doing well without complaints  She denies vision changes, headache, nausea, vomiting, shortness of breath, chest pain, nondependent swelling or edema, right upper quadrant pain  She is eating a regular diet without decreased appetite  She is voiding and having bowel movements regularly  She is bottlefeeding her  and reports good support system at home  Objective:    Vitals: Blood pressure 127/85, pulse 85, height 5' 2" (1 575 m), weight 80 3 kg (177 lb), last menstrual period 2017, not currently breastfeeding  Body mass index is 32 37 kg/m²  Physical Exam  Deferred    Assessment/Plan:    1) Elevated BP in postpartum period  CBC/CMP WNL  Hgb 10 7g/dL  Platelets 073  BP today WNL, 127/85  Will continue patient on Procardia XL 30mg q 12 hours  She is to follow up with [facility] in [facility] weeks  At that time, if blood pressure continues to remain < 140/90, will deescalate her medication to procardia XL 30mg daily with intent of discontinuing procardia completely if BP remain stable after that period  Patient has a history of preeclampsia in previous pregnancy in 2016 but did not require antihypertensive medication in between her pregnancies  2) Contraception  Patient would like to discuss contraception today  Various short-acting and long-acting forms of contraception were discussed with patient including progesterone only pills, NuvaRing, Depo-Provera injection, Nexplanon, Mirena IUD, and ParaGard IUD   Patient has a history of endometriosis and would like to be started on the Depo-Provera  Discussed with patient that the majority of patients on depo will become amenorrheic, which will be effective for reducing symptoms associated with endometriosis  Discussed that depo will increase appetite which may lead to weight gain, but no more than 5 pounds per year  Patient verbalized understanding  Depo provera script sent to pharmacy       D/w Dr Chester Sharma MD  8/7/2018  3:15 PM

## 2018-08-21 ENCOUNTER — OFFICE VISIT (OUTPATIENT)
Dept: OBGYN CLINIC | Facility: HOSPITAL | Age: 38
End: 2018-08-21
Payer: COMMERCIAL

## 2018-08-21 VITALS — DIASTOLIC BLOOD PRESSURE: 92 MMHG | BODY MASS INDEX: 33.11 KG/M2 | SYSTOLIC BLOOD PRESSURE: 135 MMHG | WEIGHT: 181 LBS

## 2018-08-21 DIAGNOSIS — I10 ESSENTIAL (PRIMARY) HYPERTENSION: ICD-10-CM

## 2018-08-21 DIAGNOSIS — Z30.9 ENCOUNTER FOR CONTRACEPTIVE MANAGEMENT, UNSPECIFIED TYPE: ICD-10-CM

## 2018-08-21 PROCEDURE — 99213 OFFICE O/P EST LOW 20 MIN: CPT | Performed by: OBSTETRICS & GYNECOLOGY

## 2018-08-21 RX ORDER — ACETAMINOPHEN AND CODEINE PHOSPHATE 120; 12 MG/5ML; MG/5ML
1 SOLUTION ORAL DAILY
Qty: 28 TABLET | Refills: 3 | Status: SHIPPED | OUTPATIENT
Start: 2018-08-21 | End: 2018-12-10 | Stop reason: SDUPTHER

## 2018-08-21 NOTE — PROGRESS NOTES
Assessment/Plan:     Diagnoses and all orders for this visit:    Hypertension, postpartum condition or complication  - Pt will continue taking Procardia XL q12 till f/u with PCP (original plan was to decrease dose to once daily if BP was lower at this visit)  -Pt advised to f/u with pcp in  [facility] on smoking cessation, and healthy diet  Essential (primary) hypertension    Encounter for contraceptive management, unspecified type  -     norethindrone (MICRONOR) 0 35 MG tablet; Take 1 tablet (0 35 mg total) by mouth daily  - return in 4 months for reassess/refill         Subjective:       Patient ID: Cheri Mcneill is a 40 y o  female  HPI  39 y/o female [id number] is now 7 weeks s/p emergency  section for prolapsed cord on 7/3/2018  She presents for blood pressure recheck  She was found to have elevated blood pressures of 150s-170s/90s during her incision check on  and was started on procardia 30mg XL  However, she continued to have elevated blood pressures 150s/100s on  and was increased to procardia XL 30mg Q12hr  Today her BP on arrival was 135/92, and a repeat BP was 137/93  Patient denies headache, nausea, vomiting, shortness of breath, chest pain or vision changes  No change in appetite  Changed her mind about depot shot, would like progesterone only pill  Pt is not breast feeding  The following portions of the patient's history were reviewed and updated as appropriate: allergies, current medications, past family history, past medical history, past social history, past surgical history and problem list     Review of Systems   Constitutional: Negative for activity change, appetite change, chills, fatigue, fever and unexpected weight change  Respiratory: Negative for apnea, cough, choking, chest tightness and shortness of breath  Cardiovascular: Negative for chest pain, palpitations and leg swelling     Gastrointestinal: Negative for abdominal distention, abdominal pain, blood in stool, constipation, diarrhea, nausea and vomiting  Genitourinary: Negative for difficulty urinating, dyspareunia, dysuria, pelvic pain and urgency  Musculoskeletal: Negative  Skin: Negative  Hematological: Negative  Objective:      /92   Wt 82 1 kg (181 lb)   LMP 08/28/2017 (Within Days)   BMI 33 11 kg/m²          Physical Exam   Constitutional: She is oriented to person, place, and time  She appears well-developed and well-nourished  No distress  HENT:   Head: Normocephalic and atraumatic  Eyes: Conjunctivae and EOM are normal  Pupils are equal, round, and reactive to light  Right eye exhibits no discharge  Left eye exhibits no discharge  Cardiovascular: Normal rate, regular rhythm and normal heart sounds  No murmur heard  Pulmonary/Chest: Effort normal and breath sounds normal  No respiratory distress  She has no wheezes  Abdominal: Soft  Bowel sounds are normal  She exhibits no distension and no mass  There is no tenderness  Scar healing well  Musculoskeletal: Normal range of motion  Neurological: She is alert and oriented to person, place, and time  Skin: Skin is warm  No rash noted  She is not diaphoretic  No erythema  No pallor

## 2018-10-11 RX ORDER — NIFEDIPINE 30 MG/1
TABLET, EXTENDED RELEASE ORAL
Qty: 30 TABLET | Refills: 1 | OUTPATIENT
Start: 2018-10-11

## 2018-11-06 ENCOUNTER — IMMUNIZATION (OUTPATIENT)
Dept: FAMILY MEDICINE CLINIC | Facility: MEDICAL CENTER | Age: 38
End: 2018-11-06
Payer: COMMERCIAL

## 2018-11-06 DIAGNOSIS — Z23 ENCOUNTER FOR IMMUNIZATION: ICD-10-CM

## 2018-11-06 PROCEDURE — 90471 IMMUNIZATION ADMIN: CPT

## 2018-11-06 PROCEDURE — 90686 IIV4 VACC NO PRSV 0.5 ML IM: CPT

## 2018-12-10 DIAGNOSIS — Z30.9 ENCOUNTER FOR CONTRACEPTIVE MANAGEMENT, UNSPECIFIED TYPE: ICD-10-CM

## 2018-12-10 RX ORDER — ACETAMINOPHEN AND CODEINE PHOSPHATE 120; 12 MG/5ML; MG/5ML
SOLUTION ORAL
Qty: 28 TABLET | Refills: 3 | Status: SHIPPED | OUTPATIENT
Start: 2018-12-10 | End: 2019-05-01 | Stop reason: SDUPTHER

## 2019-05-01 DIAGNOSIS — Z30.9 ENCOUNTER FOR CONTRACEPTIVE MANAGEMENT, UNSPECIFIED TYPE: ICD-10-CM

## 2019-05-01 RX ORDER — ACETAMINOPHEN AND CODEINE PHOSPHATE 120; 12 MG/5ML; MG/5ML
1 SOLUTION ORAL DAILY
Qty: 28 TABLET | Refills: 3 | Status: SHIPPED | OUTPATIENT
Start: 2019-05-01 | End: 2019-10-16 | Stop reason: SDUPTHER

## 2019-09-11 ENCOUNTER — OFFICE VISIT (OUTPATIENT)
Dept: URGENT CARE | Facility: MEDICAL CENTER | Age: 39
End: 2019-09-11
Payer: COMMERCIAL

## 2019-09-11 VITALS
BODY MASS INDEX: 36.58 KG/M2 | WEIGHT: 198.8 LBS | RESPIRATION RATE: 18 BRPM | HEART RATE: 112 BPM | OXYGEN SATURATION: 100 % | SYSTOLIC BLOOD PRESSURE: 124 MMHG | HEIGHT: 62 IN | TEMPERATURE: 98.9 F | DIASTOLIC BLOOD PRESSURE: 84 MMHG

## 2019-09-11 DIAGNOSIS — M54.41 ACUTE RIGHT-SIDED LOW BACK PAIN WITH RIGHT-SIDED SCIATICA: Primary | ICD-10-CM

## 2019-09-11 PROCEDURE — 99283 EMERGENCY DEPT VISIT LOW MDM: CPT | Performed by: PHYSICIAN ASSISTANT

## 2019-09-11 PROCEDURE — G0382 LEV 3 HOSP TYPE B ED VISIT: HCPCS | Performed by: PHYSICIAN ASSISTANT

## 2019-09-11 PROCEDURE — 99203 OFFICE O/P NEW LOW 30 MIN: CPT | Performed by: PHYSICIAN ASSISTANT

## 2019-09-11 RX ORDER — METHYLPREDNISOLONE 4 MG/1
TABLET ORAL
Qty: 1 EACH | Refills: 0 | Status: SHIPPED | OUTPATIENT
Start: 2019-09-11 | End: 2019-10-16

## 2019-09-11 RX ORDER — CYCLOBENZAPRINE HCL 10 MG
10 TABLET ORAL 3 TIMES DAILY PRN
Qty: 30 TABLET | Refills: 0 | Status: SHIPPED | OUTPATIENT
Start: 2019-09-11 | End: 2019-10-16

## 2019-09-11 NOTE — PATIENT INSTRUCTIONS
Please take muscle relaxer and steroid as directed   may use heating pad   continue to take ibuprofen as well   follow-up with PCP if symptoms do not resolve    Sciatica   WHAT YOU NEED TO KNOW:   Sciatica is a condition that causes pain along your sciatic nerve  The sciatic nerve runs from your spine through both sides of your buttocks  It then runs down the back of your thigh, into your lower leg and foot  Your sciatic nerve may be compressed, inflamed, irritated, or stretched  DISCHARGE INSTRUCTIONS:   Medicines:   · NSAIDs:  These medicines decrease swelling and pain  NSAIDs are available without a doctor's order  Ask your healthcare provider which medicine is right for you  Ask how much to take and when to take it  Take as directed  NSAIDs can cause stomach bleeding or kidney problems if not taken correctly  · Acetaminophen: This medicine decreases pain  Acetaminophen is available without a doctor's order  Ask how much to take and when to take it  Follow directions  Acetaminophen can cause liver damage if not taken correctly  · Muscle relaxers  help decrease pain and muscle spasms  · Take your medicine as directed  Contact your healthcare provider if you think your medicine is not helping or if you have side effects  Tell him of her if you are allergic to any medicine  Keep a list of the medicines, vitamins, and herbs you take  Include the amounts, and when and why you take them  Bring the list or the pill bottles to follow-up visits  Carry your medicine list with you in case of an emergency  Follow up with your healthcare provider as directed:  Write down your questions so you remember to ask them during your visits  Manage your symptoms:   · Activity:  Decrease your activity  Do not lift heavy objects or twist your back for at least 6 weeks  Slowly return to your usual activity  · Ice:  Ice helps decrease swelling and pain  Ice may also help prevent tissue damage   Use an ice pack, or put crushed ice in a plastic bag  Cover it with a towel and place it on your low back or leg for 15 to 20 minutes every hour or as directed  · Heat:  Heat helps decrease pain and muscle spasms  Apply heat on the area for 20 to 30 minutes every 2 hours for as many days as directed  · Physical therapy:  You may need to see physical therapist to teach you exercises to help improve movement and strength, and to decrease pain  An occupational therapist teaches you skills to help with your daily activities  · Use assistive devices if directed: You may need to wear back support, such as a back brace  You may need crutches, a cane, or a walker to decrease stress on your lower back and leg muscles  Ask your healthcare provider for more information about assistive devices and how to use them correctly  Self-care:   · Avoid pressure on your back and legs:  Do not  lift heavy objects, or stand or sit for long periods of time  · Lift objects safely:  Keep your back straight and bend your knees when you  an object  Do not bend or twist your back when you lift  · Maintain a healthy weight:  Ask your healthcare provider how much you should weigh  Ask him to help you create a weight loss plan if you are overweight  · Exercise:  Ask your healthcare provider about the best stretching, warmup, and exercise plan for you  Contact your healthcare provider if:   · You have pain in your lower back at night or when resting  · You have pain in your lower back with numbness below the knee  · You have weakness in one leg only  · You have questions or concerns about your condition or care  Return to the emergency department if:   · You have trouble holding back your urine or bowel movements  · You have weakness in both legs  · You have numbness in your groin or buttocks    © 2017 [employer] Information is for End User's use only and may not be sold, redistributed or otherwise used for commercial purposes  All illustrations and images included in CareNotes® are the copyrighted property of A [employer] M [employer]  or Alberto Saenz  The above information is an  only  It is not intended as medical advice for individual conditions or treatments  Talk to your doctor, nurse or pharmacist before following any medical regimen to see if it is safe and effective for you

## 2019-09-11 NOTE — PROGRESS NOTES
[facility]        NAME: Jesus Borrero is a 41 y o  female  : 1980    MRN: [id number]  DATE: 2019  TIME: 5:45 PM    Assessment and Plan   Acute right-sided low back pain with right-sided sciatica [M54 41]  1  Acute right-sided low back pain with right-sided sciatica  cyclobenzaprine (FLEXERIL) 10 mg tablet    methylPREDNISolone 4 MG tablet therapy pack         Patient Instructions    Please take muscle relaxer and steroid as directed   may use heating pad   continue to take ibuprofen as well   follow-up with PCP if symptoms do not resolve      Chief Complaint     Chief Complaint   Patient presents with    Back Pain     Right sided low back pain radiating down pt's left leg to the knee x 3 days  Pt denies injury  History of Present Illness        Patient is a 81-year-old female who presents today with right-sided low back pain for the past 3 days  She denies any injury  Pain radiates to her groin and down her right leg to her right knee  She denies any numbness or tingling  She does have a history of sciatica  She denies any UTI symptoms including fever, abdominal pain, dysuria, urgency, frequency  Patient has been taking Tylenol and Motrin with little relief  She denies any bowel or bladder incontinence  Review of Systems   Review of Systems   Constitutional: Negative for fever  Respiratory: Negative for shortness of breath  Cardiovascular: Negative for chest pain  Gastrointestinal: Negative for abdominal pain  Genitourinary: Negative for difficulty urinating and dysuria  Musculoskeletal: Positive for back pain  Skin: Negative for rash           Current Medications       Current Outpatient Medications:     norethindrone (MICRONOR) 0 35 MG tablet, Take 1 tablet (0 35 mg total) by mouth daily, Disp: 28 tablet, Rfl: 3    cyclobenzaprine (FLEXERIL) 10 mg tablet, Take 1 tablet (10 mg total) by mouth 3 (three) times a day as needed for muscle spasms, Disp: 30 tablet, Rfl: 0    ferrous sulfate 325 (65 Fe) mg tablet, Take 1 tablet (325 mg total) by mouth daily with breakfast for 30 days, Disp: 30 tablet, Rfl: 0    medroxyPROGESTERone (DEPO-PROVERA) 150 mg/mL injection, Inject 1 mL (150 mg total) into a muscle every 3 (three) months (Patient not taking: Reported on 2019), Disp: 1 mL, Rfl: 3    methylPREDNISolone 4 MG tablet therapy pack, Use as directed on package, Disp: 1 each, Rfl: 0    NIFEdipine (PROCARDIA XL) 30 mg 24 hr tablet, Take 2 tablets (60 mg total) by mouth daily for 30 days, Disp: 60 tablet, Rfl: 0    Current Allergies     Allergies as of 2019 - Reviewed 2019   Allergen Reaction Noted    Penicillins Other (See Comments) 2016    Ciprofloxacin Rash 2016    Ciprofloxacin Rash 2018    Penicillins Rash 2018    Sulfa antibiotics Rash 10/22/2015            The following portions of the patient's history were reviewed and updated as appropriate: allergies, current medications, past family history, past medical history, past social history, past surgical history and problem list      Past Medical History:   Diagnosis Date    Anemia     Anemia     Endometriosis     Obesity 3/6/2018    Pre-eclampsia in third trimester 2016    Pre-eclampsia in third trimester     Varicella     had chicken pox       Past Surgical History:   Procedure Laterality Date    APPENDECTOMY      APPENDECTOMY      Last Assessed: 10/12/2017- Laparoscopic     EAR SURGERY      Eustachian Tube     EXPLORATORY LAPAROTOMY      for endometriosis    [place]  DELIVERY ONLY Bilateral 7/3/2018    Procedure:  SECTION (); Surgeon: Jesus Qureshi DO;  Location: [facility] ;  Service: Obstetrics    TONSILLECTOMY         Family History   Problem Relation Age of Onset    Diabetes Mother     Diabetes Father          Medications have been verified          Objective   /84   Pulse (!) 112   Temp 98 9 °F (37 2 °C) (Temporal)   Resp 18   Ht 5' 2" (1 575 m)   Wt 90 2 kg (198 lb 12 8 oz)   LMP 09/07/2019 (Exact Date)   SpO2 100%   BMI 36 36 kg/m²        Physical Exam     Physical Exam   Constitutional: She appears well-developed and well-nourished  Cardiovascular: Normal rate and regular rhythm  Pulmonary/Chest: Effort normal and breath sounds normal    Abdominal: Soft  Bowel sounds are normal  There is no tenderness  Musculoskeletal: Normal range of motion  Back:    Skin: Skin is warm and dry  Capillary refill takes less than 2 seconds  No rash noted

## 2019-10-16 ENCOUNTER — ANNUAL EXAM (OUTPATIENT)
Dept: OBGYN CLINIC | Facility: CLINIC | Age: 39
End: 2019-10-16

## 2019-10-16 VITALS
WEIGHT: 197 LBS | DIASTOLIC BLOOD PRESSURE: 86 MMHG | BODY MASS INDEX: 36.25 KG/M2 | HEART RATE: 113 BPM | HEIGHT: 62 IN | SYSTOLIC BLOOD PRESSURE: 145 MMHG

## 2019-10-16 DIAGNOSIS — Z30.9 ENCOUNTER FOR CONTRACEPTIVE MANAGEMENT, UNSPECIFIED TYPE: ICD-10-CM

## 2019-10-16 DIAGNOSIS — Z01.419 ENCOUNTER FOR GYNECOLOGICAL EXAMINATION WITH PAPANICOLAOU SMEAR OF CERVIX: Primary | ICD-10-CM

## 2019-10-16 PROCEDURE — G0145 SCR C/V CYTO,THINLAYER,RESCR: HCPCS | Performed by: OBSTETRICS & GYNECOLOGY

## 2019-10-16 PROCEDURE — 87624 HPV HI-RISK TYP POOLED RSLT: CPT | Performed by: OBSTETRICS & GYNECOLOGY

## 2019-10-16 PROCEDURE — 99395 PREV VISIT EST AGE 18-39: CPT | Performed by: OBSTETRICS & GYNECOLOGY

## 2019-10-16 RX ORDER — ACETAMINOPHEN AND CODEINE PHOSPHATE 120; 12 MG/5ML; MG/5ML
1 SOLUTION ORAL DAILY
Qty: 28 TABLET | Refills: 3 | Status: SHIPPED | OUTPATIENT
Start: 2019-10-16 | End: 2020-01-30

## 2019-10-16 NOTE — PROGRESS NOTES
Assessment   46 y o  [id number] with regular menses who is sexually active and currently using contraception (mini-pill) presenting for annual exam      Plan   Diagnoses and all orders for this visit:    Encounter for gynecological examination with Papanicolaou smear of cervix  - Liquid-based pap, screening        - Benign physical exam        - Discussed tobacco cessation  Patient declined Chantix  Smokes 4 cigs per day and plans to cut back on her own  - Reviewed MVA safety, healthy diet/ nutrition, and exercise      Elevated Blood Pressure         - Elevated BP today 145/82  She has a hx of preeclampsia in her last pregnancy  We discussed that she is at risk for chronic hypertension  Discussed that weight loss and exercise can help to decrease her BP  Encounter for contraceptive management, unspecified type  -     norethindrone (MICRONOR) 0 35 MG tablet; Take 1 tablet (0 35 mg total) by mouth daily      __________________________________________________________________      Subjective     Crystal L Corey Valentino is a 46 y o  [id number]  with regular menses who is sexually active and currently using contraception (mini-pill) presenting for annual exam  She does not have any current complaints  GYN  Complaints: denies  Denies change in menstrual cycle, genital discharge and irregular/heavy menses  Menarche at age 16  Periods are regular, lasting 5 days  Dysmenorrhea:none  Cyclic symptoms include none  Sexually active: Yes - single partner - male  Hx STI: denies   Hx Abnormal pap: denies  Last pap: 2 years ago    OB  [id number]   Pregnancy complications: Preeclampsia with last pregnancy  Last pregnancy resulted in an emergent c/s secondary to cord prolapse     Does not desire future fertility      Complaints: denies  Denies change in urinary stream, dysuria, hematuria, nocturia and urinary frequency/urgency    BREAST  Complaints: denies  Denies: breast lump, breast tenderness, nipple discharge, pruritus, rash and skin color change  Last mammogram: Never  Personal hx: None  Family hx: Endorses family history of endometrial cancer on father's side  Specifically grandmother and her sibling  Denies family history of breast or colon cancers  GENERAL  PMH reviewed/updated and is as below  Patient does follow with a PCP  Denies domestic violence, emotional or sexual abuse  She feels safe at home   Exercise: daily for the last 2 weeks  Running close to a mile  Diet: Well balanced  States that she has removed sugary drinks from her diets and is attempting to eat leaner foods  SCREENING  Cervical Ca: Pap collected today with cotesting  Breast Ca: Breast exam performed, mammogram not indicated given patient age, lack of symptoms or family history  Colon Ca: not indicated  Depression: Negative       Past Medical History:   Diagnosis Date    Anemia     Anemia     Endometriosis     Obesity 3/6/2018    Pre-eclampsia in third trimester 2016    Pre-eclampsia in third trimester     Varicella     had chicken pox       Past Surgical History:   Procedure Laterality Date    APPENDECTOMY      APPENDECTOMY      Last Assessed: 10/12/2017- Laparoscopic     EAR SURGERY      Eustachian Tube     EXPLORATORY LAPAROTOMY      for endometriosis    [place]  DELIVERY ONLY Bilateral 7/3/2018    Procedure:  SECTION ();   Surgeon: Teja Molina DO;  [facility] ;  Service: Obstetrics    TONSILLECTOMY           Current Outpatient Medications:     norethindrone (MICRONOR) 0 35 MG tablet, Take 1 tablet (0 35 mg total) by mouth daily, Disp: 28 tablet, Rfl: 3    Allergies   Allergen Reactions    Penicillins Other (See Comments)     Took as a child and had trouble breathing per pt mom    Ciprofloxacin Rash    Ciprofloxacin Rash    Penicillins Rash       Social History     Socioeconomic History    Marital status: Single     Spouse name: Not on file    Number of children: Not on file    Years of education: Not on file    Highest education level: Not on file   Occupational History    Not on file   Social Needs    Financial resource strain: Not on file    Food insecurity:     Worry: Not on file     Inability: Not on file    Transportation needs:     Medical: Not on file     Non-medical: Not on file   Tobacco Use    Smoking status: Light Tobacco Smoker     Packs/day: 0 20     Years: 20 00     Pack years: 4 00     Types: Cigarettes    Smokeless tobacco: Former User   Substance and Sexual Activity    Alcohol use: No    Drug use: No    Sexual activity: Yes     Partners: Male     Birth control/protection: Pill, Condom Male   Lifestyle    Physical activity:     Days per week: Not on file     Minutes per session: Not on file    Stress: Not on file   Relationships    Social connections:     Talks on phone: Not on file     Gets together: Not on file     Attends Ca[employer]ism service: Not on file     Active member of club or organization: Not on file     Attends meetings of clubs or organizations: Not on file     Relationship status: Not on file    Intimate partner violence:     Fear of current or ex partner: Not on file     Emotionally abused: Not on file     Physically abused: Not on file     Forced sexual activity: Not on file   Other Topics Concern    Not on file   Social History Narrative    ** Merged History Encounter **         Always uses seat belt             Objective      /86 (BP Location: Left arm, Patient Position: Sitting, Cuff Size: Standard)   Pulse (!) 113   Ht 5' 2" (1 575 m)   Wt 89 4 kg (197 lb)   LMP 10/07/2019 (Exact Date)   BMI 36 03 kg/m²     General:   alert and oriented, in no acute distress, alert, appears stated age and cooperative   Heart: regular rate and rhythm, S1, S2 normal, no murmur, click, rub or gallop   Lungs: clear to auscultation bilaterally   Abdomen: soft, non-tender, without masses or organomegaly   Vulva: normal, Bartholin's, Urethra, Shepherd's normal Vagina: normal mucosa, no palpable nodules, scant blood   Cervix: no cervical motion tenderness and no lesions   Uterus: normal size, anteverted   Adnexa: normal adnexa        Chloe Bowers MD, MPH  OB/GYN, PGY4  10/16/2019, 4:04 PM

## 2019-10-17 LAB
HPV HR 12 DNA CVX QL NAA+PROBE: NEGATIVE
HPV16 DNA CVX QL NAA+PROBE: NEGATIVE
HPV18 DNA CVX QL NAA+PROBE: NEGATIVE

## 2019-10-20 LAB
LAB AP GYN PRIMARY INTERPRETATION: NORMAL
Lab: NORMAL

## 2019-12-18 ENCOUNTER — OFFICE VISIT (OUTPATIENT)
Dept: URGENT CARE | Facility: MEDICAL CENTER | Age: 39
End: 2019-12-18
Payer: COMMERCIAL

## 2019-12-18 VITALS
WEIGHT: 197 LBS | SYSTOLIC BLOOD PRESSURE: 130 MMHG | TEMPERATURE: 99.9 F | HEART RATE: 100 BPM | DIASTOLIC BLOOD PRESSURE: 90 MMHG | OXYGEN SATURATION: 100 % | BODY MASS INDEX: 36.25 KG/M2 | HEIGHT: 62 IN | RESPIRATION RATE: 18 BRPM

## 2019-12-18 DIAGNOSIS — K04.7 DENTAL ABSCESS: Primary | ICD-10-CM

## 2019-12-18 PROCEDURE — 99213 OFFICE O/P EST LOW 20 MIN: CPT | Performed by: PHYSICIAN ASSISTANT

## 2019-12-18 PROCEDURE — G0382 LEV 3 HOSP TYPE B ED VISIT: HCPCS | Performed by: PHYSICIAN ASSISTANT

## 2019-12-18 PROCEDURE — 99283 EMERGENCY DEPT VISIT LOW MDM: CPT | Performed by: PHYSICIAN ASSISTANT

## 2019-12-18 RX ORDER — IBUPROFEN 600 MG/1
600 TABLET ORAL EVERY 6 HOURS PRN
COMMUNITY
End: 2020-06-30

## 2019-12-18 RX ORDER — ACETAMINOPHEN 500 MG
500 TABLET ORAL EVERY 6 HOURS PRN
COMMUNITY

## 2019-12-18 RX ORDER — CLINDAMYCIN HYDROCHLORIDE 300 MG/1
300 CAPSULE ORAL 3 TIMES DAILY
Qty: 21 CAPSULE | Refills: 0 | Status: SHIPPED | OUTPATIENT
Start: 2019-12-18 | End: 2019-12-25

## 2019-12-19 NOTE — PROGRESS NOTES
[facility]        NAME: Gus Crowder is a 46 y o  female  : 1980    MRN: [id number]  DATE: 2019  TIME: 4:12 PM    Assessment and Plan   Dental abscess [K04 7]  1  Dental abscess  clindamycin (CLEOCIN) 300 MG capsule         Patient Instructions     Discussed condition with pt  She has dental abscess which I will treat with Clindamycin and rec soft diet, NSAIDs for pain and swelling, ice  She needs to see dentist ASAP  Follow up with PCP in 3-5 days  Proceed to  ER if symptoms worsen  Chief Complaint     Chief Complaint   Patient presents with    Dental Pain     Pt states she had a tooth removed 1 week ago  Today pt has mouth pain  History of Present Illness       Pt presents with what she believes is possible dental infection  She has extraction one week ago and now has pain and swelling in this area with difficulty eating/chewing  Denies fever, chills  She reports it is located in the left lower gingiva  Review of Systems   Review of Systems   Constitutional: Negative  HENT: Positive for dental problem  Respiratory: Negative  Cardiovascular: Negative  Gastrointestinal: Negative  Genitourinary: Negative            Current Medications       Current Outpatient Medications:     acetaminophen (TYLENOL) 500 mg tablet, Take 500 mg by mouth every 6 (six) hours as needed for mild pain, Disp: , Rfl:     ibuprofen (MOTRIN) 600 mg tablet, Take 600 mg by mouth every 6 (six) hours as needed for mild pain, Disp: , Rfl:     norethindrone (MICRONOR) 0 35 MG tablet, Take 1 tablet (0 35 mg total) by mouth daily, Disp: 28 tablet, Rfl: 3    Current Allergies     Allergies as of 2019 - Reviewed 2019   Allergen Reaction Noted    Penicillins Other (See Comments) 2016    Ciprofloxacin Rash 2016    Ciprofloxacin Rash 2018    Penicillins Rash 2018            The following portions of the patient's history were reviewed and updated as appropriate: allergies, current medications, past family history, past medical history, past social history, past surgical history and problem list      Past Medical History:   Diagnosis Date    Anemia     Anemia     Endometriosis     Obesity 3/6/2018    Pre-eclampsia in third trimester 2016    Pre-eclampsia in third trimester     Varicella     had chicken pox       Past Surgical History:   Procedure Laterality Date    APPENDECTOMY      APPENDECTOMY      Last Assessed: 10/12/2017- Laparoscopic     EAR SURGERY      Eustachian Tube     EXPLORATORY LAPAROTOMY      for endometriosis    [place]  DELIVERY ONLY Bilateral 7/3/2018    Procedure:  SECTION (); Surgeon: Melissa Rodriges DO;  [facility] ;  Service: Obstetrics    TONSILLECTOMY         Family History   Problem Relation Age of Onset    Diabetes Mother     Diabetes Father     No Known Problems Brother     No Known Problems Daughter     Diabetes Maternal Grandmother     No Known Problems Brother     No Known Problems Daughter     No Known Problems Daughter          Medications have been verified  Objective   /90   Pulse 100   Temp 99 9 °F (37 7 °C) (Temporal)   Resp 18   Ht 5' 2" (1 575 m)   Wt 89 4 kg (197 lb)   LMP 2019 (Exact Date)   SpO2 100%   BMI 36 03 kg/m²        Physical Exam     Physical Exam   Constitutional: She is oriented to person, place, and time  She appears well-developed and well-nourished  No distress  HENT:   Left lower gingiva with evidence of recent dental extraction of molar  Appears to be abscess formation but no active drainage/bleeding  STS left side of face over mandible visualized  Neurological: She is alert and oriented to person, place, and time  Psychiatric: She has a normal mood and affect  Vitals reviewed

## 2019-12-19 NOTE — PATIENT INSTRUCTIONS
Dental Abscess   WHAT YOU NEED TO KNOW:   A dental abscess is a collection of pus in or around a tooth  A dental abscess is caused by bacteria  The bacteria usually enter the tooth when the enamel (outer part of the tooth) is damaged by tooth decay  Bacteria may also enter the tooth through a break or chip in the tooth, or a cut in the gum  Food particles that are stuck between the teeth for a long time may also lead to an abscess  DISCHARGE INSTRUCTIONS:   Return to the emergency department if:   · You have severe pain  · You have trouble breathing because of pain or swelling  Contact your healthcare provider if:   · Your symptoms get worse, even after treatment  · Your mouth is bleeding  · You cannot eat or drink because of pain or swelling  · Your abscess returns  · You have an injury that causes a crack in your tooth  · You have questions or concerns about your condition or care  Medicines: You may  need any of the following:  · Antibiotics  help treat a bacterial infection  · NSAIDs , such as ibuprofen, help decrease swelling, pain, and fever  This medicine is available with or without a doctor's order  NSAIDs can cause stomach bleeding or kidney problems in certain people  If you take blood thinner medicine, always ask your healthcare provider if NSAIDs are safe for you  Always read the medicine label and follow directions  · Acetaminophen  decreases pain and fever  It is available without a doctor's order  Ask how much to take and how often to take it  Follow directions  Read the labels of all other medicines you are using to see if they also contain acetaminophen, or ask your doctor or pharmacist  Acetaminophen can cause liver damage if not taken correctly  Do not use more than 4 grams (4,000 milligrams) total of acetaminophen in one day  · Prescription pain medicine  may be given  Ask your healthcare provider how to take this medicine safely   Some prescription pain medicines contain acetaminophen  Do not take other medicines that contain acetaminophen without talking to your healthcare provider  Too much acetaminophen may cause liver damage  Prescription pain medicine may cause constipation  Ask your healthcare provider how to prevent or treat constipation  · Take your medicine as directed  Contact your healthcare provider if you think your medicine is not helping or if you have side effects  Tell him of her if you are allergic to any medicine  Keep a list of the medicines, vitamins, and herbs you take  Include the amounts, and when and why you take them  Bring the list or the pill bottles to follow-up visits  Carry your medicine list with you in case of an emergency  Self-care:   · Rinse your mouth every 2 hours with salt water  This will help keep the area clean  · Gently brush your teeth twice a day with a soft tooth brush  This will help keep the area clean  · Eat soft foods as directed  Soft foods may cause less pain  Examples include applesauce, yogurt, and cooked pasta  Ask your healthcare provider how long to follow this instruction  · Apply a warm compress to your tooth or gum  Use a cotton ball or gauze soaked in warm water  Remove the compress in 10 minutes or when it becomes cool  Repeat 3 times a day  Prevent another abscess:   · Brush your teeth at least 2 times a day with fluoride toothpaste  · Use dental floss to clean between your teeth at least once a day  · Rinse your mouth with water or mouthwash after meals and snacks  · Chew sugarless gum after meals and snacks  · Limit foods that are sticky and high in sugar such as raisons  Also limit drinks high in sugar, such as soda  · See your dentist every 6 months for dental cleanings and oral exams  Follow up with your healthcare provider in 24 hours: Your healthcare provider will need to check your teeth and gums   Write down your questions so you remember to ask them during your visits  © 2017 [facility] Information is for End User's use only and may not be sold, redistributed or otherwise used for commercial purposes  All illustrations and images included in CareNotes® are the copyrighted property of A D A M , Inc  or Alberto Saenz  The above information is an  only  It is not intended as medical advice for individual conditions or treatments  Talk to your doctor, nurse or pharmacist before following any medical regimen to see if it is safe and effective for you

## 2020-01-30 DIAGNOSIS — Z30.9 ENCOUNTER FOR CONTRACEPTIVE MANAGEMENT, UNSPECIFIED TYPE: ICD-10-CM

## 2020-01-30 RX ORDER — ACETAMINOPHEN AND CODEINE PHOSPHATE 120; 12 MG/5ML; MG/5ML
SOLUTION ORAL
Qty: 28 TABLET | Refills: 0 | Status: SHIPPED | OUTPATIENT
Start: 2020-01-30 | End: 2020-02-28

## 2020-02-25 ENCOUNTER — OFFICE VISIT (OUTPATIENT)
Dept: OBGYN CLINIC | Facility: CLINIC | Age: 40
End: 2020-02-25

## 2020-02-25 VITALS
HEIGHT: 62 IN | DIASTOLIC BLOOD PRESSURE: 101 MMHG | HEART RATE: 93 BPM | BODY MASS INDEX: 36.36 KG/M2 | WEIGHT: 197.6 LBS | SYSTOLIC BLOOD PRESSURE: 154 MMHG

## 2020-02-25 DIAGNOSIS — R10.32 LEFT LOWER QUADRANT ABDOMINAL PAIN: Primary | ICD-10-CM

## 2020-02-25 PROCEDURE — T1015 CLINIC SERVICE: HCPCS | Performed by: STUDENT IN AN ORGANIZED HEALTH CARE EDUCATION/TRAINING PROGRAM

## 2020-02-25 PROCEDURE — 99213 OFFICE O/P EST LOW 20 MIN: CPT | Performed by: STUDENT IN AN ORGANIZED HEALTH CARE EDUCATION/TRAINING PROGRAM

## 2020-02-25 NOTE — PROGRESS NOTES
Anna Jones is a 46 y o  female with a history of endometriosis presenting for irregular period and LLQ abdominal mass/pain  She states that her for past 2 months, her periods have been occurring more frequently every 2 weeks, lighter flow but lasting longer than usual for about 8- 10 days  Prior to these past 2 months, cycles were regular every 28 days lasting for about 2-3 days with moderate flow  Periods have also been much more painful, ibuprofen and tylenol don't help  During this time, she has also noticed an increasing mass in her LLQ that is tender to the touch  She feels that it has gotten larger in size  She has consistently been taking Micronor since her last delivery, which was a  in 2018 for a prolapsed umbilical cord  Obstetric History  OB History    Para Term  AB Living   4 3 2 1 1 3   SAB TAB Ectopic Multiple Live Births   0 0 0 0 1      # Outcome Date GA Lbr Enoch/2nd Weight Sex Delivery Anes PTL Lv   4  18 34w5d  2300 g (5 lb 1 1 oz) F CS-LVertical Gen Y LELE      Complications: Prolapse of umbilical cord, single or unspecified fetus   3 AB            2 Term            1 Term                Objective  Vitals:    20 1524   BP: (!) 154/101   BP Location: Left arm   Pulse: 93   Weight: 89 6 kg (197 lb 9 6 oz)   Height: 5' 2" (1 575 m)     Physical:  General: Patient pleasant, no acute distress  Abdominal: Approximately 4-5 cm firm but mobile mass palpated in the LLQ, non-reducible, moderately tender to touch    Assessment  This is a 46year old female who presents for irregular periods along with a tender mass in her LLQ  There is a possibility that this is a cyst, ovarian mass, or hernia  The pain and mass are not acute in nature, so less concerning for an emergent situation  Will obtain imaging and have patient come back to discuss        Plan  - Complete TVUS of the pelvis and CT abdomen and pelvis with contrast   - Continue on Micronor  - RTC after imaging to discuss results and symptoms      D/w Dr Estevan Flores

## 2020-02-26 DIAGNOSIS — Z30.9 ENCOUNTER FOR CONTRACEPTIVE MANAGEMENT, UNSPECIFIED TYPE: ICD-10-CM

## 2020-02-28 RX ORDER — ACETAMINOPHEN AND CODEINE PHOSPHATE 120; 12 MG/5ML; MG/5ML
SOLUTION ORAL
Qty: 28 TABLET | Refills: 0 | Status: SHIPPED | OUTPATIENT
Start: 2020-02-28 | End: 2020-03-28

## 2020-03-02 ENCOUNTER — TELEPHONE (OUTPATIENT)
Dept: OBGYN CLINIC | Facility: CLINIC | Age: 40
End: 2020-03-02

## 2020-03-02 ENCOUNTER — HOSPITAL ENCOUNTER (OUTPATIENT)
Dept: ULTRASOUND IMAGING | Facility: HOSPITAL | Age: 40
Discharge: HOME | End: 2020-03-02
Payer: COMMERCIAL

## 2020-03-02 ENCOUNTER — TELEPHONE (OUTPATIENT)
Dept: LABOR AND DELIVERY | Facility: HOSPITAL | Age: 40
End: 2020-03-02

## 2020-03-02 DIAGNOSIS — R10.32 LEFT LOWER QUADRANT ABDOMINAL PAIN: ICD-10-CM

## 2020-03-02 PROCEDURE — 76856 US EXAM PELVIC COMPLETE: CPT

## 2020-03-02 PROCEDURE — 76830 TRANSVAGINAL US NON-OB: CPT

## 2020-03-03 NOTE — TELEPHONE ENCOUNTER
Spoke with patient regarding ultrasound results  Explained that nothing emergent and no issues with the ovaries, however recommended a f/u CT scan of the abdomen and pelvis  She has this scheduled already for 3/10  Instructed to make a f/u appointment after she gets her scan  She reports the pain is still there but no worse  Advised that if pain gets much worse then go to ED or make another appointment

## 2020-03-09 ENCOUNTER — HOSPITAL ENCOUNTER (OUTPATIENT)
Dept: CT IMAGING | Facility: HOSPITAL | Age: 40
Discharge: HOME | End: 2020-03-09
Payer: COMMERCIAL

## 2020-03-09 DIAGNOSIS — R10.32 LEFT LOWER QUADRANT ABDOMINAL PAIN: ICD-10-CM

## 2020-03-09 PROCEDURE — 74177 CT ABD & PELVIS W/CONTRAST: CPT

## 2020-03-09 RX ADMIN — IOHEXOL 100 ML: 350 INJECTION, SOLUTION INTRAVENOUS at 22:18

## 2020-03-13 ENCOUNTER — TELEPHONE (OUTPATIENT)
Dept: OBGYN CLINIC | Facility: CLINIC | Age: 40
End: 2020-03-13

## 2020-03-13 ENCOUNTER — TELEPHONE (OUTPATIENT)
Dept: LABOR AND DELIVERY | Facility: HOSPITAL | Age: 40
End: 2020-03-13

## 2020-03-13 NOTE — TELEPHONE ENCOUNTER
Pt called to discuss CT imaging results  She had few questions, all of which were answered to her satisfaction  She is aware that she has an appointment scheduled on 3/16 with Dr Arvind Yeung; I encouraged her to keep this appointment to discuss management options  Pt is aware and in agreement at this time  She had no further questions  Pt to keep appt 3/16      Ana Paula Jin DO  OB/GYN, PGY4  3/13/2020, 3:08 PM

## 2020-03-16 DIAGNOSIS — R19.04 ABDOMINAL MASS, LLQ (LEFT LOWER QUADRANT): ICD-10-CM

## 2020-03-16 DIAGNOSIS — N94.6 DYSMENORRHEA: Primary | ICD-10-CM

## 2020-03-16 RX ORDER — KETOROLAC TROMETHAMINE 10 MG/1
10 TABLET, FILM COATED ORAL EVERY 6 HOURS PRN
Qty: 20 TABLET | Refills: 0 | Status: SHIPPED | OUTPATIENT
Start: 2020-03-16 | End: 2020-06-30 | Stop reason: ALTCHOICE

## 2020-03-16 NOTE — PROGRESS NOTES
Spoke with patient and reviewed recommendation for repeat US in 3 months  Patient states that she started menses last night and is having a lot of pain  We discussed a trial of Toradol  Rx sent to patients pharmacy  All questions answered  Patient will follow up in 3 months or sooner if needed      Jess Arevalo MD, MPH  OB/GYN, PGY4  3/16/2020, 9:00 AM

## 2020-03-27 DIAGNOSIS — Z30.9 ENCOUNTER FOR CONTRACEPTIVE MANAGEMENT, UNSPECIFIED TYPE: ICD-10-CM

## 2020-03-28 RX ORDER — ACETAMINOPHEN AND CODEINE PHOSPHATE 120; 12 MG/5ML; MG/5ML
SOLUTION ORAL
Qty: 28 TABLET | Refills: 0 | Status: SHIPPED | OUTPATIENT
Start: 2020-03-28 | End: 2020-05-01

## 2020-04-26 DIAGNOSIS — Z30.9 ENCOUNTER FOR CONTRACEPTIVE MANAGEMENT, UNSPECIFIED TYPE: ICD-10-CM

## 2020-05-01 RX ORDER — ACETAMINOPHEN AND CODEINE PHOSPHATE 120; 12 MG/5ML; MG/5ML
SOLUTION ORAL
Qty: 28 TABLET | Refills: 0 | Status: SHIPPED | OUTPATIENT
Start: 2020-05-01 | End: 2020-06-01

## 2020-06-01 DIAGNOSIS — Z30.9 ENCOUNTER FOR CONTRACEPTIVE MANAGEMENT, UNSPECIFIED TYPE: ICD-10-CM

## 2020-06-01 RX ORDER — ACETAMINOPHEN AND CODEINE PHOSPHATE 120; 12 MG/5ML; MG/5ML
SOLUTION ORAL
Qty: 28 TABLET | Refills: 0 | Status: SHIPPED | OUTPATIENT
Start: 2020-06-01 | End: 2020-07-02 | Stop reason: SDUPTHER

## 2020-06-23 ENCOUNTER — TELEPHONE (OUTPATIENT)
Dept: OBGYN CLINIC | Facility: CLINIC | Age: 40
End: 2020-06-23

## 2020-06-24 ENCOUNTER — OFFICE VISIT (OUTPATIENT)
Dept: OBGYN CLINIC | Facility: CLINIC | Age: 40
End: 2020-06-24

## 2020-06-24 ENCOUNTER — TELEPHONE (OUTPATIENT)
Dept: OBGYN CLINIC | Facility: CLINIC | Age: 40
End: 2020-06-24

## 2020-06-24 VITALS
HEIGHT: 62 IN | BODY MASS INDEX: 34.27 KG/M2 | WEIGHT: 186.2 LBS | SYSTOLIC BLOOD PRESSURE: 157 MMHG | HEART RATE: 100 BPM | DIASTOLIC BLOOD PRESSURE: 77 MMHG

## 2020-06-24 DIAGNOSIS — N80.6 ENDOMETRIOSIS IN SCAR OF SKIN: Primary | ICD-10-CM

## 2020-06-24 PROCEDURE — 99213 OFFICE O/P EST LOW 20 MIN: CPT | Performed by: OBSTETRICS & GYNECOLOGY

## 2020-06-25 ENCOUNTER — PREP FOR PROCEDURE (OUTPATIENT)
Dept: OTHER | Facility: HOSPITAL | Age: 40
End: 2020-06-25

## 2020-06-25 ENCOUNTER — TELEPHONE (OUTPATIENT)
Dept: OBGYN CLINIC | Facility: CLINIC | Age: 40
End: 2020-06-25

## 2020-06-25 DIAGNOSIS — N80.9 ENDOMETRIOSIS: Primary | ICD-10-CM

## 2020-06-25 PROBLEM — N80.6 ENDOMETRIOSIS IN SCAR OF SKIN: Status: ACTIVE | Noted: 2020-06-25

## 2020-06-30 ENCOUNTER — OFFICE VISIT (OUTPATIENT)
Dept: FAMILY MEDICINE CLINIC | Facility: MEDICAL CENTER | Age: 40
End: 2020-06-30
Payer: COMMERCIAL

## 2020-06-30 ENCOUNTER — TELEPHONE (OUTPATIENT)
Dept: FAMILY MEDICINE CLINIC | Facility: MEDICAL CENTER | Age: 40
End: 2020-06-30

## 2020-06-30 VITALS
WEIGHT: 185 LBS | RESPIRATION RATE: 16 BRPM | DIASTOLIC BLOOD PRESSURE: 78 MMHG | TEMPERATURE: 99.3 F | HEART RATE: 80 BPM | SYSTOLIC BLOOD PRESSURE: 136 MMHG | BODY MASS INDEX: 34.04 KG/M2 | HEIGHT: 62 IN

## 2020-06-30 DIAGNOSIS — Z13.29 SCREENING FOR THYROID DISORDER: ICD-10-CM

## 2020-06-30 DIAGNOSIS — Z00.00 PHYSICAL EXAM, ANNUAL: Primary | ICD-10-CM

## 2020-06-30 DIAGNOSIS — Z11.4 SCREENING FOR HIV (HUMAN IMMUNODEFICIENCY VIRUS): ICD-10-CM

## 2020-06-30 DIAGNOSIS — Z13.220 SCREENING FOR LIPID DISORDERS: ICD-10-CM

## 2020-06-30 DIAGNOSIS — Z13.0 SCREENING, ANEMIA, DEFICIENCY, IRON: ICD-10-CM

## 2020-06-30 DIAGNOSIS — Z11.59 NEED FOR HEPATITIS C SCREENING TEST: ICD-10-CM

## 2020-06-30 DIAGNOSIS — Z13.1 SCREENING FOR DIABETES MELLITUS: ICD-10-CM

## 2020-06-30 DIAGNOSIS — B35.3 TINEA PEDIS OF BOTH FEET: ICD-10-CM

## 2020-06-30 PROBLEM — O02.1 ABORTION, MISSED: Status: RESOLVED | Noted: 2017-10-12 | Resolved: 2020-06-30

## 2020-06-30 PROCEDURE — 99395 PREV VISIT EST AGE 18-39: CPT | Performed by: FAMILY MEDICINE

## 2020-06-30 RX ORDER — CLOTRIMAZOLE AND BETAMETHASONE DIPROPIONATE 10; .64 MG/G; MG/G
CREAM TOPICAL 2 TIMES DAILY PRN
Qty: 45 G | Refills: 0 | Status: SHIPPED | OUTPATIENT
Start: 2020-06-30 | End: 2020-10-27 | Stop reason: ALTCHOICE

## 2020-06-30 NOTE — TELEPHONE ENCOUNTER
Patient seen by me in the office today  Blood pressure slightly elevated initially but did improve by the end of the visit  Medication not initiated today

## 2020-07-01 ENCOUNTER — TELEPHONE (OUTPATIENT)
Dept: OBGYN CLINIC | Facility: CLINIC | Age: 40
End: 2020-07-01

## 2020-07-02 DIAGNOSIS — Z30.9 ENCOUNTER FOR CONTRACEPTIVE MANAGEMENT, UNSPECIFIED TYPE: ICD-10-CM

## 2020-07-02 RX ORDER — ACETAMINOPHEN AND CODEINE PHOSPHATE 120; 12 MG/5ML; MG/5ML
1 SOLUTION ORAL DAILY
Qty: 28 TABLET | Refills: 11 | Status: SHIPPED | OUTPATIENT
Start: 2020-07-02 | End: 2020-10-27

## 2020-07-06 ENCOUNTER — TELEPHONE (OUTPATIENT)
Dept: OBGYN CLINIC | Facility: CLINIC | Age: 40
End: 2020-07-06

## 2020-07-13 ENCOUNTER — TELEPHONE (OUTPATIENT)
Dept: OBGYN CLINIC | Facility: CLINIC | Age: 40
End: 2020-07-13

## 2020-07-13 NOTE — TELEPHONE ENCOUNTER
Patient returned call, she is  planning on going for blood work that's been ordered by her PCP which includes a CBC

## 2020-07-14 ENCOUNTER — APPOINTMENT (OUTPATIENT)
Dept: LAB | Facility: MEDICAL CENTER | Age: 40
End: 2020-07-14
Payer: COMMERCIAL

## 2020-07-14 ENCOUNTER — TELEPHONE (OUTPATIENT)
Dept: FAMILY MEDICINE CLINIC | Facility: MEDICAL CENTER | Age: 40
End: 2020-07-14

## 2020-07-14 DIAGNOSIS — Z13.220 SCREENING FOR LIPID DISORDERS: ICD-10-CM

## 2020-07-14 DIAGNOSIS — Z11.59 NEED FOR HEPATITIS C SCREENING TEST: ICD-10-CM

## 2020-07-14 DIAGNOSIS — Z13.0 SCREENING, ANEMIA, DEFICIENCY, IRON: ICD-10-CM

## 2020-07-14 DIAGNOSIS — Z13.1 SCREENING FOR DIABETES MELLITUS: ICD-10-CM

## 2020-07-14 DIAGNOSIS — Z11.4 SCREENING FOR HIV (HUMAN IMMUNODEFICIENCY VIRUS): ICD-10-CM

## 2020-07-14 DIAGNOSIS — Z13.29 SCREENING FOR THYROID DISORDER: ICD-10-CM

## 2020-07-14 LAB
ALBUMIN SERPL BCP-MCNC: 3.4 G/DL (ref 3.5–5)
ALP SERPL-CCNC: 73 U/L (ref 46–116)
ALT SERPL W P-5'-P-CCNC: 16 U/L (ref 12–78)
ANION GAP SERPL CALCULATED.3IONS-SCNC: 7 MMOL/L (ref 4–13)
AST SERPL W P-5'-P-CCNC: 9 U/L (ref 5–45)
BASOPHILS # BLD AUTO: 0.02 THOUSANDS/ΜL (ref 0–0.1)
BASOPHILS NFR BLD AUTO: 0 % (ref 0–1)
BILIRUB SERPL-MCNC: 0.56 MG/DL (ref 0.2–1)
BUN SERPL-MCNC: 6 MG/DL (ref 5–25)
CALCIUM SERPL-MCNC: 8.9 MG/DL (ref 8.3–10.1)
CHLORIDE SERPL-SCNC: 106 MMOL/L (ref 100–108)
CHOLEST SERPL-MCNC: 187 MG/DL (ref 50–200)
CO2 SERPL-SCNC: 24 MMOL/L (ref 21–32)
CREAT SERPL-MCNC: 0.45 MG/DL (ref 0.6–1.3)
EOSINOPHIL # BLD AUTO: 0.12 THOUSAND/ΜL (ref 0–0.61)
EOSINOPHIL NFR BLD AUTO: 2 % (ref 0–6)
ERYTHROCYTE [DISTWIDTH] IN BLOOD BY AUTOMATED COUNT: 18 % (ref 11.6–15.1)
FERRITIN SERPL-MCNC: 106 NG/ML (ref 8–388)
GFR SERPL CREATININE-BSD FRML MDRD: 128 ML/MIN/1.73SQ M
GLUCOSE SERPL-MCNC: 86 MG/DL (ref 65–140)
HCT VFR BLD AUTO: 27.6 % (ref 34.8–46.1)
HDLC SERPL-MCNC: 54 MG/DL
HGB BLD-MCNC: 9.9 G/DL (ref 11.5–15.4)
IMM GRANULOCYTES # BLD AUTO: 0.04 THOUSAND/UL (ref 0–0.2)
IMM GRANULOCYTES NFR BLD AUTO: 1 % (ref 0–2)
IRON SATN MFR SERPL: 27 %
IRON SERPL-MCNC: 76 UG/DL (ref 50–170)
LDLC SERPL CALC-MCNC: 115 MG/DL (ref 0–100)
LYMPHOCYTES # BLD AUTO: 1.61 THOUSANDS/ΜL (ref 0.6–4.47)
LYMPHOCYTES NFR BLD AUTO: 22 % (ref 14–44)
MCH RBC QN AUTO: 34.5 PG (ref 26.8–34.3)
MCHC RBC AUTO-ENTMCNC: 35.9 G/DL (ref 31.4–37.4)
MCV RBC AUTO: 96 FL (ref 82–98)
MONOCYTES # BLD AUTO: 0.38 THOUSAND/ΜL (ref 0.17–1.22)
MONOCYTES NFR BLD AUTO: 5 % (ref 4–12)
NEUTROPHILS # BLD AUTO: 5.25 THOUSANDS/ΜL (ref 1.85–7.62)
NEUTS SEG NFR BLD AUTO: 70 % (ref 43–75)
NRBC BLD AUTO-RTO: 0 /100 WBCS
PLATELET # BLD AUTO: 235 THOUSANDS/UL (ref 149–390)
PMV BLD AUTO: 10.5 FL (ref 8.9–12.7)
POTASSIUM SERPL-SCNC: 3.9 MMOL/L (ref 3.5–5.3)
PROT SERPL-MCNC: 7.4 G/DL (ref 6.4–8.2)
RBC # BLD AUTO: 2.87 MILLION/UL (ref 3.81–5.12)
SODIUM SERPL-SCNC: 137 MMOL/L (ref 136–145)
T4 FREE SERPL-MCNC: 0.96 NG/DL (ref 0.76–1.46)
TIBC SERPL-MCNC: 277 UG/DL (ref 250–450)
TRIGL SERPL-MCNC: 88 MG/DL
TSH SERPL DL<=0.05 MIU/L-ACNC: 1.2 UIU/ML (ref 0.36–3.74)
WBC # BLD AUTO: 7.42 THOUSAND/UL (ref 4.31–10.16)

## 2020-07-14 PROCEDURE — 86803 HEPATITIS C AB TEST: CPT

## 2020-07-14 PROCEDURE — 84443 ASSAY THYROID STIM HORMONE: CPT

## 2020-07-14 PROCEDURE — 83550 IRON BINDING TEST: CPT

## 2020-07-14 PROCEDURE — 85025 COMPLETE CBC W/AUTO DIFF WBC: CPT

## 2020-07-14 PROCEDURE — 80061 LIPID PANEL: CPT

## 2020-07-14 PROCEDURE — 80053 COMPREHEN METABOLIC PANEL: CPT

## 2020-07-14 PROCEDURE — 82728 ASSAY OF FERRITIN: CPT

## 2020-07-14 PROCEDURE — 87389 HIV-1 AG W/HIV-1&-2 AB AG IA: CPT

## 2020-07-14 PROCEDURE — 36415 COLL VENOUS BLD VENIPUNCTURE: CPT

## 2020-07-14 PROCEDURE — 84439 ASSAY OF FREE THYROXINE: CPT

## 2020-07-14 PROCEDURE — 83036 HEMOGLOBIN GLYCOSYLATED A1C: CPT

## 2020-07-14 PROCEDURE — 83540 ASSAY OF IRON: CPT

## 2020-07-14 NOTE — TELEPHONE ENCOUNTER
She was here to establish, the surgeon needs clearance from PCP prior to schedulinng  It is an  endometrial implant removal    They will see addendum to the note

## 2020-07-15 ENCOUNTER — PREP FOR PROCEDURE (OUTPATIENT)
Dept: OBGYN CLINIC | Facility: CLINIC | Age: 40
End: 2020-07-15

## 2020-07-15 ENCOUNTER — TELEPHONE (OUTPATIENT)
Dept: FAMILY MEDICINE CLINIC | Facility: MEDICAL CENTER | Age: 40
End: 2020-07-15

## 2020-07-15 ENCOUNTER — TELEPHONE (OUTPATIENT)
Dept: OBGYN CLINIC | Facility: CLINIC | Age: 40
End: 2020-07-15

## 2020-07-15 DIAGNOSIS — R10.32 LEFT LOWER QUADRANT PAIN: Primary | ICD-10-CM

## 2020-07-15 LAB
HCV AB SER QL: NORMAL
HIV 1+2 AB+HIV1 P24 AG SERPL QL IA: NORMAL

## 2020-07-15 NOTE — TELEPHONE ENCOUNTER
Informed patient of surgery date 7/31/20 and H&P on 7/22/20  Patient aware of covid test 10 days prior to surgery  Surgery booklet mailed to patient  Pre auth not required  Mirena script will be sent to Pharmacy prior to surgery

## 2020-07-15 NOTE — TELEPHONE ENCOUNTER
Alda from [facility] called ext [phone]  They need her  note to state that she's medically cleared for surgery

## 2020-07-15 NOTE — TELEPHONE ENCOUNTER
Patient seen by me on 6/30/2020  Her blood pressure was normal in the office  Do you need any additional information from me? See message from patient

## 2020-07-17 LAB — HBA1C MFR BLD: NORMAL %

## 2020-07-20 DIAGNOSIS — N80.9 ENDOMETRIOSIS: ICD-10-CM

## 2020-07-21 ENCOUNTER — TELEPHONE (OUTPATIENT)
Dept: FAMILY MEDICINE CLINIC | Facility: MEDICAL CENTER | Age: 40
End: 2020-07-21

## 2020-07-21 ENCOUNTER — TELEPHONE (OUTPATIENT)
Dept: OBGYN CLINIC | Facility: CLINIC | Age: 40
End: 2020-07-21

## 2020-07-21 DIAGNOSIS — D64.9 ANEMIA, UNSPECIFIED TYPE: Primary | ICD-10-CM

## 2020-07-21 NOTE — TELEPHONE ENCOUNTER
----- Message from Ke Barber DO sent at 7/21/2020  4:01 PM EDT -----  Patient remains anemic  I would like her to see Hematology  Hemoglobin A1c appears to have been canceled  Glucose otherwise unremarkable  Remainder of labs unremarkable

## 2020-07-22 ENCOUNTER — OFFICE VISIT (OUTPATIENT)
Dept: OBGYN CLINIC | Facility: CLINIC | Age: 40
End: 2020-07-22

## 2020-07-22 VITALS
DIASTOLIC BLOOD PRESSURE: 89 MMHG | HEIGHT: 62 IN | SYSTOLIC BLOOD PRESSURE: 142 MMHG | BODY MASS INDEX: 33.68 KG/M2 | TEMPERATURE: 98.4 F | WEIGHT: 183 LBS

## 2020-07-22 DIAGNOSIS — R10.32 LEFT LOWER QUADRANT PAIN: ICD-10-CM

## 2020-07-22 DIAGNOSIS — Z30.014 ENCOUNTER FOR INITIAL PRESCRIPTION OF INTRAUTERINE CONTRACEPTIVE DEVICE (IUD): Primary | ICD-10-CM

## 2020-07-22 DIAGNOSIS — Z01.818 ENCOUNTER FOR PREOPERATIVE EXAMINATION FOR GENERAL SURGICAL PROCEDURE: Primary | ICD-10-CM

## 2020-07-22 PROCEDURE — 3008F BODY MASS INDEX DOCD: CPT | Performed by: FAMILY MEDICINE

## 2020-07-22 PROCEDURE — 3008F BODY MASS INDEX DOCD: CPT | Performed by: OBSTETRICS & GYNECOLOGY

## 2020-07-22 PROCEDURE — 99214 OFFICE O/P EST MOD 30 MIN: CPT | Performed by: OBSTETRICS & GYNECOLOGY

## 2020-07-22 PROCEDURE — 3079F DIAST BP 80-89 MM HG: CPT | Performed by: OBSTETRICS & GYNECOLOGY

## 2020-07-22 PROCEDURE — 3077F SYST BP >= 140 MM HG: CPT | Performed by: OBSTETRICS & GYNECOLOGY

## 2020-07-22 PROCEDURE — U0003 INFECTIOUS AGENT DETECTION BY NUCLEIC ACID (DNA OR RNA); SEVERE ACUTE RESPIRATORY SYNDROME CORONAVIRUS 2 (SARS-COV-2) (CORONAVIRUS DISEASE [COVID-19]), AMPLIFIED PROBE TECHNIQUE, MAKING USE OF HIGH THROUGHPUT TECHNOLOGIES AS DESCRIBED BY CMS-2020-01-R: HCPCS | Performed by: OBSTETRICS & GYNECOLOGY

## 2020-07-22 PROCEDURE — 1036F TOBACCO NON-USER: CPT | Performed by: OBSTETRICS & GYNECOLOGY

## 2020-07-22 NOTE — PRE-PROCEDURE INSTRUCTIONS
Pre-Surgery Instructions:   Medication Instructions    acetaminophen (TYLENOL) 500 mg tablet Instructed patient per Anesthesia Guidelines   clotrimazole-betamethasone (LOTRISONE) 1-0 05 % cream Instructed patient per Anesthesia Guidelines   norethindrone (MICRONOR) 0 35 MG tablet Instructed patient per Anesthesia Guidelines  Spoke to pt  Medication list reviewed & instructed   As of 7/24 pt instructed on tylenol only   Am DOS no medications   Showering instructions provided by surgeon office, reviewed @ time of call   Negative COVID screening   All instructions verbally understood by patient  All questions answered   Callback number provided

## 2020-07-22 NOTE — PROGRESS NOTES
H&P Exam - Obstetrics   Bon Santizo 46 y o  female MRN: [id number]  Unit/Bed#:  Encounter: [id number]      ASSESSMENT:  Patient here for an H&P for surgical removal of endometrial implant and Mirena IUD  Plan:  - Refer to prior note for further documentation on counseling  - Patient counseled again today regarding the risks to procedure including infection, bleeding, reoccurrence, anesthesia risks  - Surgery scheduled for 2020  - Hibiclens cleanse and instructions given today  - Patient notified that she will be called the night before her surgery and told what time her case is scheduled for        History of Present Illness     HPI:  Carlos Hughes is a 46 y o  [id number] female who presents for an H&P  She is feeling well overall with no complains  OB History    Para Term  AB Living   4 3 2 1 1 3   SAB TAB Ectopic Multiple Live Births   0 0 0 0 1      # Outcome Date GA Lbr Enoch/2nd Weight Sex Delivery Anes PTL Lv   4  18 34w5d  2300 g (5 lb 1 1 oz) F CS-LVertical Gen Y LELE      Complications: Prolapse of umbilical cord, single or unspecified fetus   3 AB            2 Term            1 Term                    Review of Systems   Constitutional: Negative for chills and fever  Eyes: Negative for visual disturbance  Respiratory: Negative for cough and shortness of breath  Cardiovascular: Negative for chest pain, palpitations and leg swelling  Gastrointestinal: Negative for abdominal pain, diarrhea, nausea and vomiting  Genitourinary: Negative for dysuria, hematuria, pelvic pain, vaginal bleeding, vaginal discharge and vaginal pain  Neurological: Negative for dizziness, light-headedness and headaches           Historical Information   Past Medical History:   Diagnosis Date    , missed 10/12/2017    Anemia     Anemia     Endometriosis     Essential (primary) hypertension 3/31/2016    Hypertension, postpartum condition or complication     Obesity 3/6/2018    Pre-eclampsia in third trimester 2016    Pre-eclampsia in third trimester     Varicella     had chicken pox     Past Surgical History:   Procedure Laterality Date    APPENDECTOMY      APPENDECTOMY      Last Assessed: 10/12/2017- Laparoscopic     EAR SURGERY      Eustachian Tube     EXPLORATORY LAPAROTOMY      for endometriosis    [place]  DELIVERY ONLY Bilateral 7/3/2018    Procedure:  SECTION (); Surgeon: Mina Wood DO;  Location: [facility];  Service: Obstetrics    TONSILLECTOMY       Social History   Social History     Substance and Sexual Activity   Alcohol Use No     Social History     Substance and Sexual Activity   Drug Use No     Social History     Tobacco Use   Smoking Status Former Smoker    Packs/day: 0 20    Years: 20 00    Pack years: 4 00    Types: Cigarettes    Last attempt to quit: 2019    Years since quittin 9   Smokeless Tobacco Never Used     Family History: non-contributory    Meds/Allergies      (Not in a hospital admission)     Allergies   Allergen Reactions    Penicillins Other (See Comments)     Took as a child and had trouble breathing per pt mom    Ciprofloxacin Rash    Ciprofloxacin Rash    Penicillins Rash       OBJECTIVE:    Vitals: Blood pressure 142/89, temperature 98 4 °F (36 9 °C), height 5' 2" (1 575 m), weight 83 kg (183 lb), last menstrual period 2020, not currently breastfeeding  Body mass index is 33 47 kg/m²  Physical Exam   Constitutional: She is oriented to person, place, and time  She appears well-developed and well-nourished  No distress  HENT:   Head: Normocephalic and atraumatic  Cardiovascular: Normal rate, regular rhythm and normal heart sounds  Pulmonary/Chest: Effort normal and breath sounds normal  No respiratory distress  Abdominal: Soft  There is no tenderness  There is no guarding     Small mass on the LLQ, above her old incision, mildly tender to palpation    Musculoskeletal: She exhibits no edema or tenderness  Neurological: She is alert and oriented to person, place, and time  Skin: Skin is warm and dry  Psychiatric: She has a normal mood and affect   Her behavior is normal  Thought content normal

## 2020-07-23 ENCOUNTER — TELEPHONE (OUTPATIENT)
Dept: HEMATOLOGY ONCOLOGY | Facility: CLINIC | Age: 40
End: 2020-07-23

## 2020-07-23 DIAGNOSIS — Z30.014 ENCOUNTER FOR INITIAL PRESCRIPTION OF INTRAUTERINE CONTRACEPTIVE DEVICE (IUD): Primary | ICD-10-CM

## 2020-07-23 LAB — SARS-COV-2 RNA SPEC QL NAA+PROBE: NOT DETECTED

## 2020-07-23 NOTE — TELEPHONE ENCOUNTER
New Patient Encounter    New Patient Intake Form   Patient Details:  Ekaterina Whelan  1980  [id number]    Background Information:  [address] starts by opening a telephone encounter and gathering the following information   Who is calling to schedule? If not self, relationship to patient? self   Referring Provider    What is the diagnosis? fe def anemia   Is this diagnosis confirmed? Yes   When was the diagnosis? childhood   Is there a confirmed diagnosis from a biopsy/tissue reviewed by pathology? Is patient aware of diagnosis? Yes   Is there a personal history and what kind? Is there a family history and what kind? Reason for visit? History Of   Have you had any imaging or labs done? If so: when, where? yes  [facility]   Are records in EPIC? yes   Was the patient told to bring a disk? no   Does the patient smoke or Vape? no   If yes, how many packs or cartridges per day? Scheduling Information:   Preferred [place]:  [facility]     Are there any dates/time the patient cannot be seen? Miscellaneous:    After completing the above information, please route to Financial Counselor and the appropriate Nurse Navigator for review

## 2020-07-28 NOTE — TELEPHONE ENCOUNTER
Pharmacy was called to make sure Mirena device was ordered and ready to be delivery for surgery on 7/31/20  All was confirmed by [place]

## 2020-07-31 ENCOUNTER — HOSPITAL ENCOUNTER (OUTPATIENT)
Facility: HOSPITAL | Age: 40
Setting detail: OUTPATIENT SURGERY
Discharge: HOME | End: 2020-07-31
Attending: OBSTETRICS & GYNECOLOGY | Admitting: OBSTETRICS & GYNECOLOGY
Payer: COMMERCIAL

## 2020-07-31 VITALS
RESPIRATION RATE: 16 BRPM | HEIGHT: 62 IN | DIASTOLIC BLOOD PRESSURE: 67 MMHG | OXYGEN SATURATION: 100 % | SYSTOLIC BLOOD PRESSURE: 140 MMHG | TEMPERATURE: 97.1 F | HEART RATE: 93 BPM | WEIGHT: 183 LBS | BODY MASS INDEX: 33.68 KG/M2

## 2020-07-31 LAB
B-HCG SERPL-ACNC: ABNORMAL MIU/ML
EXT PREGNANCY TEST URINE: POSITIVE
EXT. CONTROL: ABNORMAL

## 2020-07-31 PROCEDURE — 81025 URINE PREGNANCY TEST: CPT | Performed by: OBSTETRICS & GYNECOLOGY

## 2020-07-31 PROCEDURE — 84702 CHORIONIC GONADOTROPIN TEST: CPT | Performed by: OBSTETRICS & GYNECOLOGY

## 2020-07-31 RX ORDER — LIDOCAINE HYDROCHLORIDE 10 MG/ML
0.5 INJECTION, SOLUTION EPIDURAL; INFILTRATION; INTRACAUDAL; PERINEURAL ONCE AS NEEDED
Status: DISCONTINUED | OUTPATIENT
Start: 2020-07-31 | End: 2020-07-31 | Stop reason: HOSPADM

## 2020-07-31 RX ORDER — SODIUM CHLORIDE, SODIUM LACTATE, POTASSIUM CHLORIDE, CALCIUM CHLORIDE 600; 310; 30; 20 MG/100ML; MG/100ML; MG/100ML; MG/100ML
125 INJECTION, SOLUTION INTRAVENOUS CONTINUOUS
Status: DISCONTINUED | OUTPATIENT
Start: 2020-07-31 | End: 2020-07-31 | Stop reason: HOSPADM

## 2020-08-04 ENCOUNTER — TELEPHONE (OUTPATIENT)
Dept: OBGYN CLINIC | Facility: CLINIC | Age: 40
End: 2020-08-04

## 2020-08-04 NOTE — TELEPHONE ENCOUNTER
Called patient in regards to her surgery being cancelled on day of surgery  Waiting for return call

## 2020-08-12 ENCOUNTER — TELEPHONE (OUTPATIENT)
Dept: OBGYN CLINIC | Facility: CLINIC | Age: 40
End: 2020-08-12

## 2020-08-12 NOTE — TELEPHONE ENCOUNTER
COVID Pre-Visit Screening     1  Is this a family member screening? No  2  Have you traveled outside of your state in the past 2 weeks? No  3  Do you presently have a fever or flu-like symptoms? No  4  Do you have symptoms of an upper respiratory infection like runny nose, sore throat, or cough? No  5  Are you suffering from new headache that you have not had in the past?  No  6  Do you have/have you experienced any new shortness of breath recently? No  7  Do you have any new diarrhea, nausea or vomiting? No  8  Have you been in contact with anyone who has been sick or diagnosed with COVID-19? Yes  9  Do you have any new loss of taste or smell? No  10  Are you able to wear a mask without a valve for the entire visit?  Yes

## 2020-10-19 ENCOUNTER — ULTRASOUND (OUTPATIENT)
Dept: OBGYN CLINIC | Facility: CLINIC | Age: 40
End: 2020-10-19

## 2020-10-19 VITALS
BODY MASS INDEX: 34.41 KG/M2 | DIASTOLIC BLOOD PRESSURE: 69 MMHG | SYSTOLIC BLOOD PRESSURE: 148 MMHG | HEIGHT: 62 IN | WEIGHT: 187 LBS | HEART RATE: 112 BPM | TEMPERATURE: 97.7 F

## 2020-10-19 DIAGNOSIS — N91.2 AMENORRHEA: Primary | ICD-10-CM

## 2020-10-19 PROCEDURE — 99214 OFFICE O/P EST MOD 30 MIN: CPT | Performed by: OBSTETRICS & GYNECOLOGY

## 2020-10-19 PROCEDURE — 1036F TOBACCO NON-USER: CPT | Performed by: OBSTETRICS & GYNECOLOGY

## 2020-10-19 PROCEDURE — 3008F BODY MASS INDEX DOCD: CPT | Performed by: OBSTETRICS & GYNECOLOGY

## 2020-10-19 RX ORDER — CHOLECALCIFEROL (VITAMIN D3) 25 MCG
1 TABLET,CHEWABLE ORAL DAILY
Qty: 30 CAPSULE | Refills: 12 | Status: SHIPPED | OUTPATIENT
Start: 2020-10-19

## 2020-10-27 ENCOUNTER — INITIAL PRENATAL (OUTPATIENT)
Dept: OBGYN CLINIC | Facility: CLINIC | Age: 40
End: 2020-10-27

## 2020-10-27 VITALS
DIASTOLIC BLOOD PRESSURE: 83 MMHG | BODY MASS INDEX: 34.96 KG/M2 | WEIGHT: 190 LBS | TEMPERATURE: 97.1 F | HEIGHT: 62 IN | SYSTOLIC BLOOD PRESSURE: 125 MMHG | HEART RATE: 102 BPM

## 2020-10-27 DIAGNOSIS — O10.919 CHRONIC HYPERTENSION AFFECTING PREGNANCY: ICD-10-CM

## 2020-10-27 DIAGNOSIS — Z3A.23 23 WEEKS GESTATION OF PREGNANCY: ICD-10-CM

## 2020-10-27 DIAGNOSIS — O09.299 HX OF PREECLAMPSIA, PRIOR PREGNANCY, CURRENTLY PREGNANT: ICD-10-CM

## 2020-10-27 DIAGNOSIS — Z34.92 PREGNANT AND NOT YET DELIVERED IN SECOND TRIMESTER: ICD-10-CM

## 2020-10-27 DIAGNOSIS — O09.899 H/O PRETERM DELIVERY, CURRENTLY PREGNANT: ICD-10-CM

## 2020-10-27 DIAGNOSIS — Z98.891 HISTORY OF C-SECTION: ICD-10-CM

## 2020-10-27 DIAGNOSIS — O09.522 MULTIGRAVIDA OF ADVANCED MATERNAL AGE IN SECOND TRIMESTER: Primary | ICD-10-CM

## 2020-10-27 DIAGNOSIS — O09.30 LATE PRENATAL CARE: ICD-10-CM

## 2020-10-27 PROCEDURE — T1001 NURSING ASSESSMENT/EVALUATN: HCPCS

## 2020-10-27 RX ORDER — FERROUS SULFATE 325(65) MG
325 TABLET ORAL
COMMUNITY

## 2020-10-28 ENCOUNTER — TELEPHONE (OUTPATIENT)
Dept: PERINATAL CARE | Facility: CLINIC | Age: 40
End: 2020-10-28

## 2020-10-28 PROBLEM — Z01.419 ENCOUNTER FOR GYNECOLOGICAL EXAMINATION WITH PAPANICOLAOU SMEAR OF CERVIX: Status: RESOLVED | Noted: 2019-10-16 | Resolved: 2020-10-28

## 2020-10-28 PROBLEM — Z30.9 ENCOUNTER FOR CONTRACEPTIVE MANAGEMENT: Status: RESOLVED | Noted: 2019-10-16 | Resolved: 2020-10-28

## 2020-10-28 PROBLEM — O99.210 OBESITY AFFECTING PREGNANCY: Status: ACTIVE | Noted: 2018-03-06

## 2020-10-28 PROBLEM — O34.219 HISTORY OF CESAREAN DELIVERY AFFECTING PREGNANCY: Status: ACTIVE | Noted: 2020-10-28

## 2020-10-28 PROBLEM — O09.899 HISTORY OF PRETERM DELIVERY, CURRENTLY PREGNANT: Status: ACTIVE | Noted: 2020-10-28

## 2020-10-28 PROBLEM — O09.299 HX OF PREECLAMPSIA, PRIOR PREGNANCY, CURRENTLY PREGNANT: Status: ACTIVE | Noted: 2020-10-28

## 2020-10-30 ENCOUNTER — PATIENT OUTREACH (OUTPATIENT)
Dept: OBGYN CLINIC | Facility: CLINIC | Age: 40
End: 2020-10-30

## 2020-11-09 ENCOUNTER — PATIENT OUTREACH (OUTPATIENT)
Dept: OBGYN CLINIC | Facility: CLINIC | Age: 40
End: 2020-11-09

## 2020-11-10 ENCOUNTER — PATIENT OUTREACH (OUTPATIENT)
Dept: OBGYN CLINIC | Facility: CLINIC | Age: 40
End: 2020-11-10

## 2020-11-11 ENCOUNTER — TELEPHONE (OUTPATIENT)
Dept: PERINATAL CARE | Facility: OTHER | Age: 40
End: 2020-11-11

## 2020-11-12 ENCOUNTER — TELEPHONE (OUTPATIENT)
Dept: OBGYN CLINIC | Facility: CLINIC | Age: 40
End: 2020-11-12

## 2020-11-13 ENCOUNTER — TELEPHONE (OUTPATIENT)
Dept: PERINATAL CARE | Facility: OTHER | Age: 40
End: 2020-11-13

## 2020-11-16 ENCOUNTER — ROUTINE PRENATAL (OUTPATIENT)
Dept: PERINATAL CARE | Facility: OTHER | Age: 40
End: 2020-11-16
Payer: COMMERCIAL

## 2020-11-16 ENCOUNTER — TELEPHONE (OUTPATIENT)
Dept: PERINATAL CARE | Facility: CLINIC | Age: 40
End: 2020-11-16

## 2020-11-16 VITALS
WEIGHT: 190.7 LBS | DIASTOLIC BLOOD PRESSURE: 78 MMHG | SYSTOLIC BLOOD PRESSURE: 132 MMHG | BODY MASS INDEX: 35.09 KG/M2 | TEMPERATURE: 96.6 F | HEART RATE: 92 BPM | HEIGHT: 62 IN

## 2020-11-16 DIAGNOSIS — O09.522 MULTIGRAVIDA OF ADVANCED MATERNAL AGE IN SECOND TRIMESTER: Primary | ICD-10-CM

## 2020-11-16 DIAGNOSIS — N91.2 AMENORRHEA: ICD-10-CM

## 2020-11-16 DIAGNOSIS — O34.219 HISTORY OF CESAREAN DELIVERY AFFECTING PREGNANCY: ICD-10-CM

## 2020-11-16 DIAGNOSIS — Z3A.27 27 WEEKS GESTATION OF PREGNANCY: ICD-10-CM

## 2020-11-16 PROCEDURE — 76811 OB US DETAILED SNGL FETUS: CPT | Performed by: OBSTETRICS & GYNECOLOGY

## 2020-11-16 PROCEDURE — 99213 OFFICE O/P EST LOW 20 MIN: CPT | Performed by: OBSTETRICS & GYNECOLOGY

## 2020-11-16 PROCEDURE — 1036F TOBACCO NON-USER: CPT | Performed by: OBSTETRICS & GYNECOLOGY

## 2020-11-20 ENCOUNTER — TELEPHONE (OUTPATIENT)
Dept: OBGYN CLINIC | Facility: CLINIC | Age: 40
End: 2020-11-20

## 2020-11-23 ENCOUNTER — TELEPHONE (OUTPATIENT)
Dept: OBGYN CLINIC | Facility: CLINIC | Age: 40
End: 2020-11-23

## 2020-11-23 PROBLEM — Z3A.28 28 WEEKS GESTATION OF PREGNANCY: Status: ACTIVE | Noted: 2020-11-16

## 2020-11-25 ENCOUNTER — TELEPHONE (OUTPATIENT)
Dept: OBGYN CLINIC | Facility: CLINIC | Age: 40
End: 2020-11-25

## 2020-11-27 ENCOUNTER — ROUTINE PRENATAL (OUTPATIENT)
Dept: OBGYN CLINIC | Facility: CLINIC | Age: 40
End: 2020-11-27

## 2020-11-27 VITALS
HEIGHT: 62 IN | DIASTOLIC BLOOD PRESSURE: 71 MMHG | BODY MASS INDEX: 35.88 KG/M2 | WEIGHT: 195 LBS | SYSTOLIC BLOOD PRESSURE: 146 MMHG | HEART RATE: 113 BPM

## 2020-11-27 DIAGNOSIS — Z23 FLU VACCINE NEED: ICD-10-CM

## 2020-11-27 DIAGNOSIS — N89.8 VAGINAL DISCHARGE DURING PREGNANCY IN SECOND TRIMESTER: ICD-10-CM

## 2020-11-27 DIAGNOSIS — O26.892 VAGINAL DISCHARGE DURING PREGNANCY IN SECOND TRIMESTER: ICD-10-CM

## 2020-11-27 DIAGNOSIS — Z3A.28 28 WEEKS GESTATION OF PREGNANCY: Primary | ICD-10-CM

## 2020-11-27 LAB
SL AMB  POCT GLUCOSE, UA: NEGATIVE
SL AMB LEUKOCYTE ESTERASE,UA: NEGATIVE
SL AMB POCT BILIRUBIN,UA: NEGATIVE
SL AMB POCT BLOOD,UA: ABNORMAL
SL AMB POCT KETONES,UA: NEGATIVE
SL AMB POCT NITRITE,UA: NEGATIVE
SL AMB POCT PH,UA: 6
SL AMB POCT SPECIFIC GRAVITY,UA: 1
SL AMB POCT URINE PROTEIN: NEGATIVE
SL AMB POCT UROBILINOGEN: NEGATIVE

## 2020-11-27 PROCEDURE — 3008F BODY MASS INDEX DOCD: CPT | Performed by: OBSTETRICS & GYNECOLOGY

## 2020-11-27 PROCEDURE — 81002 URINALYSIS NONAUTO W/O SCOPE: CPT | Performed by: OBSTETRICS & GYNECOLOGY

## 2020-11-27 PROCEDURE — 99213 OFFICE O/P EST LOW 20 MIN: CPT | Performed by: OBSTETRICS & GYNECOLOGY

## 2020-11-27 PROCEDURE — 90686 IIV4 VACC NO PRSV 0.5 ML IM: CPT | Performed by: OBSTETRICS & GYNECOLOGY

## 2020-11-27 PROCEDURE — 90471 IMMUNIZATION ADMIN: CPT | Performed by: OBSTETRICS & GYNECOLOGY

## 2020-11-27 PROCEDURE — 90715 TDAP VACCINE 7 YRS/> IM: CPT | Performed by: OBSTETRICS & GYNECOLOGY

## 2020-11-27 PROCEDURE — 90472 IMMUNIZATION ADMIN EACH ADD: CPT | Performed by: OBSTETRICS & GYNECOLOGY

## 2020-11-27 PROCEDURE — 87591 N.GONORRHOEAE DNA AMP PROB: CPT | Performed by: OBSTETRICS & GYNECOLOGY

## 2020-11-27 PROCEDURE — 87491 CHLMYD TRACH DNA AMP PROBE: CPT | Performed by: OBSTETRICS & GYNECOLOGY

## 2020-11-27 RX ORDER — METRONIDAZOLE 500 MG/1
500 TABLET ORAL EVERY 12 HOURS SCHEDULED
Qty: 14 TABLET | Refills: 0 | Status: SHIPPED | OUTPATIENT
Start: 2020-11-27 | End: 2020-12-04

## 2020-11-29 LAB
C TRACH DNA SPEC QL NAA+PROBE: NEGATIVE
N GONORRHOEA DNA SPEC QL NAA+PROBE: NEGATIVE

## 2020-12-10 ENCOUNTER — TELEPHONE (OUTPATIENT)
Dept: OBGYN CLINIC | Facility: CLINIC | Age: 40
End: 2020-12-10

## 2020-12-10 PROBLEM — Z3A.30 30 WEEKS GESTATION OF PREGNANCY: Status: ACTIVE | Noted: 2020-11-16

## 2020-12-29 ENCOUNTER — TELEPHONE (OUTPATIENT)
Dept: PERINATAL CARE | Facility: CLINIC | Age: 40
End: 2020-12-29

## 2020-12-30 ENCOUNTER — TELEPHONE (OUTPATIENT)
Dept: PERINATAL CARE | Facility: CLINIC | Age: 40
End: 2020-12-30

## 2021-01-05 ENCOUNTER — ROUTINE PRENATAL (OUTPATIENT)
Dept: OBGYN CLINIC | Facility: CLINIC | Age: 41
End: 2021-01-05

## 2021-01-05 VITALS
BODY MASS INDEX: 36.47 KG/M2 | DIASTOLIC BLOOD PRESSURE: 83 MMHG | WEIGHT: 198.2 LBS | HEIGHT: 62 IN | HEART RATE: 103 BPM | SYSTOLIC BLOOD PRESSURE: 165 MMHG

## 2021-01-05 DIAGNOSIS — Z3A.34 34 WEEKS GESTATION OF PREGNANCY: Primary | ICD-10-CM

## 2021-01-05 LAB
SL AMB  POCT GLUCOSE, UA: NEGATIVE
SL AMB LEUKOCYTE ESTERASE,UA: ABNORMAL
SL AMB POCT BILIRUBIN,UA: NEGATIVE
SL AMB POCT BLOOD,UA: ABNORMAL
SL AMB POCT KETONES,UA: NEGATIVE
SL AMB POCT NITRITE,UA: NEGATIVE
SL AMB POCT URINE PROTEIN: NEGATIVE

## 2021-01-05 PROCEDURE — 1036F TOBACCO NON-USER: CPT | Performed by: OBSTETRICS & GYNECOLOGY

## 2021-01-05 PROCEDURE — 3008F BODY MASS INDEX DOCD: CPT | Performed by: OBSTETRICS & GYNECOLOGY

## 2021-01-05 PROCEDURE — 99213 OFFICE O/P EST LOW 20 MIN: CPT | Performed by: OBSTETRICS & GYNECOLOGY

## 2021-01-05 PROCEDURE — 81002 URINALYSIS NONAUTO W/O SCOPE: CPT | Performed by: OBSTETRICS & GYNECOLOGY

## 2021-01-05 NOTE — PROGRESS NOTES
OB/GYN prenatal visit    S: 45 y o  [id number] 34w3d here for PN visit  She denies obstetric complaints, including pelvic pain, contractions, vaginal bleeding, loss of fluid, or decreased fetal movement  This pregnancy is complicated by late to prenatal care (at 28 weeks), history of preeclampsia, history of postpartum preeclampsia, and  delivery at 34w5d due to PPROM and cord prolapse  Her medical history is significant for endometriosis and GBS positive status in prior pregnancy  O:  Vitals:    21 1150   BP: 165/83   Pulse: 103       Gen: no acute distress, nonlabored breathing  FHT: 150bpm  Fundal height: 34cm    A/P:      IUP at 34w3d  [facility] US needs to be scheduled (she plans to schedule today)  Flu vaccine 20, TDAP vaccine 20  Breastfeeding: Hayden Roberto  Discussed  labor precautions and fetal kick counts    Return to clinic in 1 week for BP check    History of preeclampsia in prior pregnancy, postpartum preeclampsia in subsequent pregnancy  BP in office today 165/83  Denies dizziness, changes in vision, RUQ pain, nausea, vomiting, nondependent swelling or edema  Given her severe range blood pressure in office and history of preeclampsia, along with inadequate prenatal care this pregnancy, I'm sending patient over to [address] [place] [address] L&D for evaluation and completion of prenatal labs (prenatal panel, CMP, P/C, 1hr GTT)  Late to prenatal care at 28 weeks  She has not had prenatal labs drawn this pregnancy due to difficulty with coordinating transportation  Prenatal labs can be drawn on L&D    Contraception  Plan for permanent sterilization  MA consent form signed on 20  She is aware that this is a permanent and irreversible method of contraception and would like to proceed  Discussed completion of BTL at time of RLTCS if able      History of T incision for C/S in 2018  Reviewed op note and discussed with Dr Oscar Sneed - will treat as classical  section with plan to deliver starting at 36 weeks  Patient is scheduled for RLTCS+BTL on 1/18/21 at 1230 or sooner if she develops preeclampsia with severe features    COVID 19 precautions were discussed with patient at length, reviewed symptoms, hygiene, social distancing, patient to call office 24/7 with questions/concerns      Jeovanny Roach MD  1/5/2021  12:19 PM

## 2021-01-05 NOTE — PATIENT INSTRUCTIONS
Pregnancy at 31 to 34 Weeks   AMBULATORY CARE:   What changes are happening to your body: You may continue to have symptoms such as shortness of breath, heartburn, contractions, or swelling of your ankles and feet  You may be gaining about 1 pound a week now  Seek care immediately if:   · You develop a severe headache that does not go away  · You have new or increased vision changes, such as blurred or spotted vision  · You have new or increased swelling in your face or hands  · You have vaginal spotting or bleeding  · Your water broke or you feel warm water gushing or trickling from your vagina  Contact your healthcare provider if:   · You have more than 5 contractions in 1 hour  · You notice any changes in your baby's movements  · You have abdominal cramps, pressure, or tightening  · You have a change in vaginal discharge  · You have chills or a fever  · You have vaginal itching, burning, or pain  · You have yellow, green, white, or foul-smelling vaginal discharge  · You have pain or burning when you urinate, less urine than usual, or pink or bloody urine  · You have questions or concerns about your condition or care  How to care for yourself at this stage of your pregnancy:   · Eat a variety of healthy foods  Healthy foods include fruits, vegetables, whole-grain breads, low-fat dairy foods, beans, lean meats, and fish  Drink liquids as directed  Ask how much liquid to drink each day and which liquids are best for you  Limit caffeine to less than 200 milligrams each day  Limit your intake of fish to 2 servings each week  Choose fish low in mercury such as canned light tuna, shrimp, salmon, cod, or tilapia  Do not  eat fish high in mercury such as swordfish, tilefish, justin mackerel, and shark  · Manage heartburn  by eating 4 or 5 small meals each day instead of large meals  Avoid spicy food  · Manage swelling  by lying down and putting your feet up  · Take prenatal vitamins as directed  Your need for certain vitamins and minerals, such as folic acid, increases during pregnancy  Prenatal vitamins provide some of the extra vitamins and minerals you need  Prenatal vitamins may also help to decrease the risk of certain birth defects  · Talk to your healthcare provider about exercise  Moderate exercise can help you stay fit  Your healthcare provider will help you plan an exercise program that is safe for you during pregnancy  · Do not smoke  Smoking increases your risk of a miscarriage and other health problems during your pregnancy  Smoking can cause your baby to be born too early or weigh less at birth  Ask your healthcare provider for information if you need help quitting  · Do not drink alcohol  Alcohol passes from your body to your baby through the placenta  It can affect your baby's brain development and cause fetal alcohol syndrome (FAS)  FAS is a group of conditions that causes mental, behavior, and growth problems  · Talk to your healthcare provider before you take any medicines  Many medicines may harm your baby if you take them when you are pregnant  Do not take any medicines, vitamins, herbs, or supplements without first talking to your healthcare provider  Never use illegal or street drugs (such as marijuana or cocaine) while you are pregnant  Safety tips during pregnancy:   · Avoid hot tubs and saunas  Do not use a hot tub or sauna while you are pregnant, especially during your first trimester  Hot tubs and saunas may raise your baby's temperature and increase the risk of birth defects  · Avoid toxoplasmosis  This is an infection caused by eating raw meat or being around infected cat feces  It can cause birth defects, miscarriages, and other problems  Wash your hands after you touch raw meat  Make sure any meat is well-cooked before you eat it  Avoid raw eggs and unpasteurized milk   Use gloves or ask someone else to clean your cat's litter box while you are pregnant  Changes that are happening with your baby:  By 34 weeks, your baby may weigh more than 5 pounds  Your baby will be about 12 ½ inches long from the top of the head to the rump (baby's bottom)  Your baby is gaining about ½ pound a week  Your baby's eyes open and close now  Your baby's kicks and movements are more forceful at this time  What you need to know about prenatal care: Your healthcare provider will check your blood pressure and weight  You may also need the following:  · A urine test  may also be done to check for sugar and protein  These can be signs of gestational diabetes or infection  Protein in your urine may also be a sign of preeclampsia  Preeclampsia is a condition that can develop during week 20 or later of your pregnancy  It causes high blood pressure, and it can cause problems with your kidneys and other organs  · A Tdap vaccine  may be recommended by your healthcare provider  · Fundal height  is a measurement of your uterus to check your baby's growth  This number is usually the same as the number of weeks that you have been pregnant  Your healthcare provider may also check your baby's position  · Your baby's heart rate  will be checked  © Copyright [address] Information is for End User's use only and may not be sold, redistributed or otherwise used for commercial purposes  All illustrations and images included in CareNotes® are the copyrighted property of A D A M , Inc  or [facility] Sheila Burgess   The above information is an  only  It is not intended as medical advice for individual conditions or treatments  Talk to your doctor, nurse or pharmacist before following any medical regimen to see if it is safe and effective for you

## 2021-01-06 ENCOUNTER — TELEPHONE (OUTPATIENT)
Dept: LABOR AND DELIVERY | Facility: HOSPITAL | Age: 41
End: 2021-01-06

## 2021-01-06 NOTE — TELEPHONE ENCOUNTER
Patient is a 45year old [id number] at 34w4d today  This pregnancy is complicated by late to prenatal care (at 28 weeks), history of preeclampsia, history of postpartum preeclampsia, and  delivery via C/S (T incision) at 34w5d due to PPROM and cord prolapse  Her medical history is significant for endometriosis and GBS positive status in prior pregnancy  I saw her for her prenatal visit yesterday 21  During her visit, the following points were addressed:  1) Need for [facility] to be re-scheduled  2) Completion of prenatal panel, 1hr GTT, CMP, P/C ratio  3) RLTCS+BTL at 36w2d scheduled on 2021 at 1230 given her history of T incision with prior pregnancy (will treat as classical section)  During her office visit, she denied obstetric concerns  She denied nausea, vomiting, chest pain, shortness of breath vision changes, right upper quadrant pain, swelling or edema  During her office visit, she had a blood pressure of 165/83  Given her severe range blood pressure and history of preeclampsia, along with inadequate prenatal care this pregnancy, I sent Gaviota over to [facility] for evaluation and completion of her prenatal labs  Gaviota did not show up to [facility] yesterday  I called patient to touch base with her around 1400 today  Patient did not show up because she checked her blood pressures at home after relaxing and found them to be normal   Given difficulty with transportation, she was not able to come for evaluation  I discussed importance of evaluation given her complicated obstetric history  She currently denies obstetric concern or symptoms of preeclampsia  She reports that she takes her blood pressures at home on a daily basis  I recommended her to come to [facility] for blood pressure evaluation and lab work today  If she is unwilling to do so, I recommended for her to make an appointment to see us next week in office for blood pressure check and continued prenatal care   I gave Gaviota harvey precautions for when to present for evaluation and discussed her earlier  at 36 weeks 2 days given prior T-incision  Patient states that she will get lab work done as soon as possible and will make an appointment for [address] [place] [address] (missed last appointment) and [facility] for prenatal care  Will task [facility]  to call patient and assist with making appointment      Leonila Stewart MD  OBGYN, PGY-4  2021  2:21 PM

## 2021-01-17 ENCOUNTER — ANESTHESIA EVENT (INPATIENT)
Dept: LABOR AND DELIVERY | Facility: HOSPITAL | Age: 41
DRG: 540 | End: 2021-01-17
Payer: COMMERCIAL

## 2021-01-18 ENCOUNTER — ANESTHESIA (INPATIENT)
Dept: LABOR AND DELIVERY | Facility: HOSPITAL | Age: 41
DRG: 540 | End: 2021-01-18
Payer: COMMERCIAL

## 2021-01-18 ENCOUNTER — HOSPITAL ENCOUNTER (INPATIENT)
Facility: HOSPITAL | Age: 41
LOS: 3 days | Discharge: HOME | DRG: 540 | End: 2021-01-21
Attending: OBSTETRICS & GYNECOLOGY | Admitting: OBSTETRICS & GYNECOLOGY
Payer: COMMERCIAL

## 2021-01-18 VITALS — HEART RATE: 85 BPM

## 2021-01-18 DIAGNOSIS — Z3A.36 36 WEEKS GESTATION OF PREGNANCY: ICD-10-CM

## 2021-01-18 DIAGNOSIS — Z98.891 HISTORY OF CLASSICAL CESAREAN SECTION: ICD-10-CM

## 2021-01-18 DIAGNOSIS — Z98.891 S/P CESAREAN SECTION: Primary | ICD-10-CM

## 2021-01-18 LAB
ABO GROUP BLD: NORMAL
ALBUMIN SERPL BCP-MCNC: 2.9 G/DL (ref 3.5–5)
ALP SERPL-CCNC: 126 U/L (ref 46–116)
ALT SERPL W P-5'-P-CCNC: 11 U/L (ref 12–78)
ANION GAP SERPL CALCULATED.3IONS-SCNC: 12 MMOL/L (ref 4–13)
AST SERPL W P-5'-P-CCNC: 8 U/L (ref 5–45)
BASE EXCESS BLDCOA CALC-SCNC: -4.3 MMOL/L (ref 3–11)
BASE EXCESS BLDCOV CALC-SCNC: -2.4 MMOL/L (ref 1–9)
BILIRUB SERPL-MCNC: 0.76 MG/DL (ref 0.2–1)
BLD GP AB SCN SERPL QL: NEGATIVE
BUN SERPL-MCNC: 6 MG/DL (ref 5–25)
CALCIUM ALBUM COR SERPL-MCNC: 9.4 MG/DL (ref 8.3–10.1)
CALCIUM SERPL-MCNC: 8.5 MG/DL (ref 8.3–10.1)
CHLORIDE SERPL-SCNC: 103 MMOL/L (ref 100–108)
CO2 SERPL-SCNC: 23 MMOL/L (ref 21–32)
CREAT SERPL-MCNC: 0.42 MG/DL (ref 0.6–1.3)
ERYTHROCYTE [DISTWIDTH] IN BLOOD BY AUTOMATED COUNT: 19.2 % (ref 11.6–15.1)
GFR SERPL CREATININE-BSD FRML MDRD: 130 ML/MIN/1.73SQ M
GLUCOSE SERPL-MCNC: 92 MG/DL (ref 65–140)
HBV SURFACE AG SER QL: NORMAL
HCO3 BLDCOA-SCNC: 22 MMOL/L (ref 17.3–27.3)
HCO3 BLDCOV-SCNC: 23.7 MMOL/L (ref 12.2–28.6)
HCT VFR BLD AUTO: 27.5 % (ref 34.8–46.1)
HGB BLD-MCNC: 9.5 G/DL (ref 11.5–15.4)
MCH RBC QN AUTO: 35.1 PG (ref 26.8–34.3)
MCHC RBC AUTO-ENTMCNC: 34.5 G/DL (ref 31.4–37.4)
MCV RBC AUTO: 102 FL (ref 82–98)
O2 CT VFR BLDCOA CALC: 8 ML/DL
OXYHGB MFR BLDCOA: 39.7 %
OXYHGB MFR BLDCOV: 48.5 %
PCO2 BLDCOA: 44.6 MM[HG] (ref 30–60)
PCO2 BLDCOV: 45.9 MM HG (ref 27–43)
PH BLDCOA: 7.31 [PH] (ref 7.23–7.43)
PH BLDCOV: 7.33 [PH] (ref 7.19–7.49)
PLATELET # BLD AUTO: 215 THOUSANDS/UL (ref 149–390)
PMV BLD AUTO: 9.5 FL (ref 8.9–12.7)
PO2 BLDCOA: 17.7 MM HG (ref 5–25)
PO2 BLDCOV: 20.4 MM HG (ref 15–45)
POTASSIUM SERPL-SCNC: 3.2 MMOL/L (ref 3.5–5.3)
PROT SERPL-MCNC: 7 G/DL (ref 6.4–8.2)
RBC # BLD AUTO: 2.71 MILLION/UL (ref 3.81–5.12)
RH BLD: POSITIVE
RPR SER QL: NORMAL
RUBV IGG SERPL IA-ACNC: >175 IU/ML
SAO2 % BLDCOV: 10.1 ML/DL
SODIUM SERPL-SCNC: 138 MMOL/L (ref 136–145)
SPECIMEN EXPIRATION DATE: NORMAL
WBC # BLD AUTO: 13.33 THOUSAND/UL (ref 4.31–10.16)

## 2021-01-18 PROCEDURE — 86850 RBC ANTIBODY SCREEN: CPT | Performed by: OBSTETRICS & GYNECOLOGY

## 2021-01-18 PROCEDURE — 4A1HXCZ MONITORING OF PRODUCTS OF CONCEPTION, CARDIAC RATE, EXTERNAL APPROACH: ICD-10-PCS | Performed by: OBSTETRICS & GYNECOLOGY

## 2021-01-18 PROCEDURE — 86592 SYPHILIS TEST NON-TREP QUAL: CPT | Performed by: OBSTETRICS & GYNECOLOGY

## 2021-01-18 PROCEDURE — 86901 BLOOD TYPING SEROLOGIC RH(D): CPT | Performed by: OBSTETRICS & GYNECOLOGY

## 2021-01-18 PROCEDURE — 30233N1 TRANSFUSION OF NONAUTOLOGOUS RED BLOOD CELLS INTO PERIPHERAL VEIN, PERCUTANEOUS APPROACH: ICD-10-PCS | Performed by: OBSTETRICS & GYNECOLOGY

## 2021-01-18 PROCEDURE — 59514 CESAREAN DELIVERY ONLY: CPT | Performed by: OBSTETRICS & GYNECOLOGY

## 2021-01-18 PROCEDURE — 85027 COMPLETE CBC AUTOMATED: CPT | Performed by: OBSTETRICS & GYNECOLOGY

## 2021-01-18 PROCEDURE — 82805 BLOOD GASES W/O2 SATURATION: CPT | Performed by: OBSTETRICS & GYNECOLOGY

## 2021-01-18 PROCEDURE — 88312 SPECIAL STAINS GROUP 1: CPT | Performed by: PATHOLOGY

## 2021-01-18 PROCEDURE — 0JB80ZZ EXCISION OF ABDOMEN SUBCUTANEOUS TISSUE AND FASCIA, OPEN APPROACH: ICD-10-PCS | Performed by: OBSTETRICS & GYNECOLOGY

## 2021-01-18 PROCEDURE — 86762 RUBELLA ANTIBODY: CPT | Performed by: OBSTETRICS & GYNECOLOGY

## 2021-01-18 PROCEDURE — 87340 HEPATITIS B SURFACE AG IA: CPT | Performed by: OBSTETRICS & GYNECOLOGY

## 2021-01-18 PROCEDURE — 86900 BLOOD TYPING SEROLOGIC ABO: CPT | Performed by: OBSTETRICS & GYNECOLOGY

## 2021-01-18 PROCEDURE — 88307 TISSUE EXAM BY PATHOLOGIST: CPT | Performed by: PATHOLOGY

## 2021-01-18 PROCEDURE — 86923 COMPATIBILITY TEST ELECTRIC: CPT

## 2021-01-18 PROCEDURE — NC001 PR NO CHARGE: Performed by: OBSTETRICS & GYNECOLOGY

## 2021-01-18 PROCEDURE — 80053 COMPREHEN METABOLIC PANEL: CPT | Performed by: OBSTETRICS & GYNECOLOGY

## 2021-01-18 RX ORDER — ACETAMINOPHEN 325 MG/1
650 TABLET ORAL EVERY 6 HOURS
Status: DISCONTINUED | OUTPATIENT
Start: 2021-01-18 | End: 2021-01-21

## 2021-01-18 RX ORDER — CLINDAMYCIN PHOSPHATE 900 MG/50ML
900 INJECTION INTRAVENOUS ONCE
Status: COMPLETED | OUTPATIENT
Start: 2021-01-18 | End: 2021-01-18

## 2021-01-18 RX ORDER — OXYTOCIN/RINGER'S LACTATE 30/500 ML
62.5 PLASTIC BAG, INJECTION (ML) INTRAVENOUS CONTINUOUS
Status: ACTIVE | OUTPATIENT
Start: 2021-01-18 | End: 2021-01-18

## 2021-01-18 RX ORDER — OXYCODONE HYDROCHLORIDE AND ACETAMINOPHEN 5; 325 MG/1; MG/1
1 TABLET ORAL EVERY 6 HOURS PRN
Status: DISCONTINUED | OUTPATIENT
Start: 2021-01-18 | End: 2021-01-21

## 2021-01-18 RX ORDER — METOCLOPRAMIDE HYDROCHLORIDE 5 MG/ML
10 INJECTION INTRAMUSCULAR; INTRAVENOUS ONCE AS NEEDED
Status: DISCONTINUED | OUTPATIENT
Start: 2021-01-18 | End: 2021-01-19

## 2021-01-18 RX ORDER — NALOXONE HYDROCHLORIDE 0.4 MG/ML
0.1 INJECTION, SOLUTION INTRAMUSCULAR; INTRAVENOUS; SUBCUTANEOUS
Status: ACTIVE | OUTPATIENT
Start: 2021-01-18 | End: 2021-01-19

## 2021-01-18 RX ORDER — ONDANSETRON 2 MG/ML
4 INJECTION INTRAMUSCULAR; INTRAVENOUS EVERY 8 HOURS PRN
Status: DISCONTINUED | OUTPATIENT
Start: 2021-01-18 | End: 2021-01-18

## 2021-01-18 RX ORDER — DEXAMETHASONE SODIUM PHOSPHATE 4 MG/ML
INJECTION, SOLUTION INTRA-ARTICULAR; INTRALESIONAL; INTRAMUSCULAR; INTRAVENOUS; SOFT TISSUE AS NEEDED
Status: DISCONTINUED | OUTPATIENT
Start: 2021-01-18 | End: 2021-01-18

## 2021-01-18 RX ORDER — MORPHINE SULFATE 0.5 MG/ML
INJECTION, SOLUTION EPIDURAL; INTRATHECAL; INTRAVENOUS AS NEEDED
Status: DISCONTINUED | OUTPATIENT
Start: 2021-01-18 | End: 2021-01-18

## 2021-01-18 RX ORDER — SODIUM CHLORIDE, SODIUM LACTATE, POTASSIUM CHLORIDE, CALCIUM CHLORIDE 600; 310; 30; 20 MG/100ML; MG/100ML; MG/100ML; MG/100ML
125 INJECTION, SOLUTION INTRAVENOUS CONTINUOUS
Status: DISCONTINUED | OUTPATIENT
Start: 2021-01-18 | End: 2021-01-21 | Stop reason: HOSPADM

## 2021-01-18 RX ORDER — BUPIVACAINE HYDROCHLORIDE 7.5 MG/ML
INJECTION, SOLUTION INTRASPINAL AS NEEDED
Status: DISCONTINUED | OUTPATIENT
Start: 2021-01-18 | End: 2021-01-18

## 2021-01-18 RX ORDER — METOCLOPRAMIDE HYDROCHLORIDE 5 MG/ML
5 INJECTION INTRAMUSCULAR; INTRAVENOUS EVERY 6 HOURS PRN
Status: DISCONTINUED | OUTPATIENT
Start: 2021-01-18 | End: 2021-01-19

## 2021-01-18 RX ORDER — FENTANYL CITRATE 50 UG/ML
INJECTION, SOLUTION INTRAMUSCULAR; INTRAVENOUS AS NEEDED
Status: DISCONTINUED | OUTPATIENT
Start: 2021-01-18 | End: 2021-01-18

## 2021-01-18 RX ORDER — HYDROXYZINE HYDROCHLORIDE 25 MG/1
25 TABLET, FILM COATED ORAL EVERY 6 HOURS PRN
Status: DISCONTINUED | OUTPATIENT
Start: 2021-01-18 | End: 2021-01-21 | Stop reason: HOSPADM

## 2021-01-18 RX ORDER — LIDOCAINE HYDROCHLORIDE 10 MG/ML
INJECTION, SOLUTION EPIDURAL; INFILTRATION; INTRACAUDAL; PERINEURAL AS NEEDED
Status: DISCONTINUED | OUTPATIENT
Start: 2021-01-18 | End: 2021-01-18

## 2021-01-18 RX ORDER — KETOROLAC TROMETHAMINE 30 MG/ML
INJECTION, SOLUTION INTRAMUSCULAR; INTRAVENOUS AS NEEDED
Status: DISCONTINUED | OUTPATIENT
Start: 2021-01-18 | End: 2021-01-18

## 2021-01-18 RX ORDER — OXYTOCIN/RINGER'S LACTATE 30/500 ML
PLASTIC BAG, INJECTION (ML) INTRAVENOUS CONTINUOUS PRN
Status: DISCONTINUED | OUTPATIENT
Start: 2021-01-18 | End: 2021-01-18

## 2021-01-18 RX ORDER — HYDROMORPHONE HCL/PF 1 MG/ML
0.5 SYRINGE (ML) INJECTION
Status: DISCONTINUED | OUTPATIENT
Start: 2021-01-18 | End: 2021-01-19

## 2021-01-18 RX ORDER — ONDANSETRON 2 MG/ML
4 INJECTION INTRAMUSCULAR; INTRAVENOUS EVERY 4 HOURS PRN
Status: ACTIVE | OUTPATIENT
Start: 2021-01-18 | End: 2021-01-19

## 2021-01-18 RX ORDER — DIPHENHYDRAMINE HYDROCHLORIDE 50 MG/ML
25 INJECTION INTRAMUSCULAR; INTRAVENOUS EVERY 6 HOURS PRN
Status: ACTIVE | OUTPATIENT
Start: 2021-01-18 | End: 2021-01-19

## 2021-01-18 RX ORDER — DEXAMETHASONE SODIUM PHOSPHATE 4 MG/ML
8 INJECTION, SOLUTION INTRA-ARTICULAR; INTRALESIONAL; INTRAMUSCULAR; INTRAVENOUS; SOFT TISSUE ONCE AS NEEDED
Status: ACTIVE | OUTPATIENT
Start: 2021-01-18 | End: 2021-01-19

## 2021-01-18 RX ORDER — FENTANYL CITRATE/PF 50 MCG/ML
50 SYRINGE (ML) INJECTION
Status: DISCONTINUED | OUTPATIENT
Start: 2021-01-18 | End: 2021-01-19

## 2021-01-18 RX ORDER — KETOROLAC TROMETHAMINE 30 MG/ML
30 INJECTION, SOLUTION INTRAMUSCULAR; INTRAVENOUS EVERY 6 HOURS SCHEDULED
Status: COMPLETED | OUTPATIENT
Start: 2021-01-18 | End: 2021-01-19

## 2021-01-18 RX ORDER — ONDANSETRON 2 MG/ML
INJECTION INTRAMUSCULAR; INTRAVENOUS AS NEEDED
Status: DISCONTINUED | OUTPATIENT
Start: 2021-01-18 | End: 2021-01-18

## 2021-01-18 RX ADMIN — MORPHINE SULFATE 0.15 MG: 0.5 INJECTION, SOLUTION EPIDURAL; INTRATHECAL; INTRAVENOUS at 11:52

## 2021-01-18 RX ADMIN — BUPIVACAINE HYDROCHLORIDE IN DEXTROSE 1.6 ML: 7.5 INJECTION, SOLUTION SUBARACHNOID at 11:52

## 2021-01-18 RX ADMIN — ONDANSETRON 4 MG: 2 INJECTION INTRAMUSCULAR; INTRAVENOUS at 11:53

## 2021-01-18 RX ADMIN — DEXAMETHASONE SODIUM PHOSPHATE 4 MG: 4 INJECTION INTRA-ARTICULAR; INTRALESIONAL; INTRAMUSCULAR; INTRAVENOUS; SOFT TISSUE at 11:54

## 2021-01-18 RX ADMIN — KETOROLAC TROMETHAMINE 30 MG: 30 INJECTION, SOLUTION INTRAMUSCULAR at 20:05

## 2021-01-18 RX ADMIN — SODIUM CHLORIDE, SODIUM LACTATE, POTASSIUM CHLORIDE, AND CALCIUM CHLORIDE: .6; .31; .03; .02 INJECTION, SOLUTION INTRAVENOUS at 13:18

## 2021-01-18 RX ADMIN — PHENYLEPHRINE HYDROCHLORIDE 50 MCG/MIN: 10 INJECTION INTRAVENOUS at 11:54

## 2021-01-18 RX ADMIN — OXYCODONE HYDROCHLORIDE AND ACETAMINOPHEN 1 TABLET: 5; 325 TABLET ORAL at 17:55

## 2021-01-18 RX ADMIN — FENTANYL CITRATE 15 MCG: 50 INJECTION, SOLUTION INTRAMUSCULAR; INTRAVENOUS at 11:52

## 2021-01-18 RX ADMIN — Medication 62.5 MILLI-UNITS/MIN: at 14:31

## 2021-01-18 RX ADMIN — SODIUM CHLORIDE, SODIUM LACTATE, POTASSIUM CHLORIDE, AND CALCIUM CHLORIDE 125 ML/HR: .6; .31; .03; .02 INJECTION, SOLUTION INTRAVENOUS at 09:26

## 2021-01-18 RX ADMIN — GENTAMICIN SULFATE 100 MG: 40 INJECTION, SOLUTION INTRAMUSCULAR; INTRAVENOUS at 11:54

## 2021-01-18 RX ADMIN — SODIUM CHLORIDE, SODIUM LACTATE, POTASSIUM CHLORIDE, AND CALCIUM CHLORIDE 125 ML/HR: .6; .31; .03; .02 INJECTION, SOLUTION INTRAVENOUS at 18:08

## 2021-01-18 RX ADMIN — FENTANYL CITRATE 50 MCG: 50 INJECTION, SOLUTION INTRAMUSCULAR; INTRAVENOUS at 14:35

## 2021-01-18 RX ADMIN — HYDROXYZINE HYDROCHLORIDE 25 MG: 25 TABLET ORAL at 14:11

## 2021-01-18 RX ADMIN — FENTANYL CITRATE 50 MCG: 50 INJECTION, SOLUTION INTRAMUSCULAR; INTRAVENOUS at 15:11

## 2021-01-18 RX ADMIN — KETOROLAC TROMETHAMINE 30 MG: 30 INJECTION, SOLUTION INTRAMUSCULAR at 13:09

## 2021-01-18 RX ADMIN — SODIUM CHLORIDE, SODIUM LACTATE, POTASSIUM CHLORIDE, AND CALCIUM CHLORIDE 999 ML/HR: .6; .31; .03; .02 INJECTION, SOLUTION INTRAVENOUS at 23:21

## 2021-01-18 RX ADMIN — CLINDAMYCIN PHOSPHATE 900 MG: 18 INJECTION, SOLUTION INTRAMUSCULAR; INTRAVENOUS at 12:00

## 2021-01-18 RX ADMIN — PHENYLEPHRINE HYDROCHLORIDE 200 MCG: 10 INJECTION INTRAVENOUS at 12:31

## 2021-01-18 RX ADMIN — ACETAMINOPHEN 650 MG: 325 TABLET, FILM COATED ORAL at 22:20

## 2021-01-18 RX ADMIN — SODIUM CHLORIDE, SODIUM LACTATE, POTASSIUM CHLORIDE, AND CALCIUM CHLORIDE 125 ML/HR: .6; .31; .03; .02 INJECTION, SOLUTION INTRAVENOUS at 10:12

## 2021-01-18 RX ADMIN — LIDOCAINE HYDROCHLORIDE 3 ML: 10 INJECTION, SOLUTION EPIDURAL; INFILTRATION; INTRACAUDAL; PERINEURAL at 11:50

## 2021-01-18 RX ADMIN — Medication 250 MILLI-UNITS/MIN: at 12:19

## 2021-01-18 NOTE — ANESTHESIA PREPROCEDURE EVALUATION
Procedure:   SECTION () REPEAT (N/A Uterus)    Relevant Problems   GYN   (+) 36 weeks gestation of pregnancy      HEMATOLOGY   (+) Anemia      NEURO/PSYCH   (+) History of  delivery, currently pregnant   (+) Hx of preeclampsia, prior pregnancy, currently pregnant        Physical Exam    Airway    Mallampati score: I  TM Distance: >3 FB  Neck ROM: full     Dental   No notable dental hx     Cardiovascular      Pulmonary      Other Findings        Anesthesia Plan  ASA Score- 3     Anesthesia Type- spinal with ASA Monitors  Additional Monitors:   Airway Plan:           Plan Factors-Exercise tolerance (METS): >4 METS  Chart reviewed  Existing labs reviewed  Patient summary reviewed  Patient is not a current smoker  Obstructive sleep apnea risk education given perioperatively  Induction-     Postoperative Plan- Plan for postoperative opioid use  Informed Consent- Anesthetic plan and risks discussed with patient  I personally reviewed this patient with the CRNA  Discussed and agreed on the Anesthesia Plan with the CRNA  Aline Huber

## 2021-01-18 NOTE — DISCHARGE SUMMARY
Discharge Summary - OB/GYN   Caryl Santizo 45 y o  female MRN: [id number]  Unit/Bed#: LD OR 2 Encounter: [id number]      Admission Date: 2021     Discharge Date: ***    Admitting Diagnosis:   1  Pregnancy at 36w2d  2  ***    Discharge Diagnosis:   Same, delivered  ***    Procedures: {delivery outcome:57989}    Attending: Michael Almeida MD    Hospital Course:     Nakia Paiz is a 45 y o  G***P*** at 36w2d wks who was initially admitted for ***  She delivered a viable {Desc; male/female:39927}  on *** at ***  Weight ***lbs ***oz via {delivery outcome:85406}  Apgars were *** (1 min) and *** (5 min)   was transferred to *** nursery  Patient tolerated the procedure well and was transferred to recovery in stable condition  Her post-operative course was complicated by ***  Preoperative hemaglobin was ***, postoperative was ***  Her postoperative pain was well controlled with oral analgesics  On day of discharge, she was ambulating and able to reasonably perform all ADLs  She was voiding and had appropriate bowel function  Pain was well controlled  She was discharged home on post-operative day #*** without complications  Patient was instructed to follow up with her OB as an outpatient and was given appropriate warnings to call provider if she develops signs of infection or uncontrolled pain  Complications: none apparent    Condition at discharge: {condition:33534}     Discharge instructions/Information to patient and family:   See after visit summary for information provided to patient and family  Provisions for Follow-Up Care:  See after visit summary for information related to follow-up care and any pertinent home health orders  Disposition: {Discharge Disposition:25591}    Planned Readmission: {EXAM; YES/NO:89055::"No"}    Discharge Medications: For a complete list of the patient's medications, please refer to her med rec

## 2021-01-18 NOTE — ANESTHESIA PROCEDURE NOTES
Spinal Block    Patient location during procedure: OR  Start time: 1/18/2021 11:52 AM  Reason for block: primary anesthetic  Staffing  Resident/CRNA: Christopher Newman CRNA  Performed: resident/CRNA   Preanesthetic Checklist  Completed: patient identified, surgical consent, pre-op evaluation, timeout performed, IV checked, risks and benefits discussed and monitors and equipment checked  Spinal Block  Patient position: sitting  Prep: ChloraPrep  Patient monitoring: heart rate, continuous pulse ox and frequent blood pressure checks  Approach: midline  Location: L3-4  Injection technique: single-shot  Needle  Needle type: pencil-tip   Needle gauge: 25 G  Needle length: 5 cm  Assessment  Sensory level: T4  Events: cerebrospinal fluid  Injection Assessment:  negative aspiration for heme, no paresthesia on injection and positive aspiration for clear CSF    Post-procedure:  site cleaned  Additional Notes  1 attempt

## 2021-01-18 NOTE — ANESTHESIA POSTPROCEDURE EVALUATION
Post-Op Assessment Note    CV Status:  Stable    Pain management: adequate     Mental Status:  Alert and awake   Hydration Status:  Stable   PONV Controlled:  None   Airway Patency:  Patent      Post Op Vitals Reviewed: Yes      Staff: CRNA         No complications documented      BP   126/87   Temp      Pulse 86   Resp   16   SpO2   99% on RA   Postop VS in PACU noted above, SV non-obstructed

## 2021-01-18 NOTE — DISCHARGE SUMMARY
Discharge Summary - OB/GYN   Talia Santizo 45 y o  female MRN: [id number]  Unit/Bed#:  OR  Encounter: [id number]      Admission Date: 2021     Discharge Date: 2021    Admitting Diagnosis:   1  Pregnancy at 36w2d  2  Prior  x1 with T incision   3  H/o preeclampsia   4  Anemia     Discharge Diagnosis:   Same, delivered    Procedures: repeat  section, low transverse incision    Admitted and Delivery Attending: Yvonne Palacios MD  Discharge Attending: Dr Renetta Downing:     Joshua Mcconnell is a 45 y o  [id number] at 36w2d wks who was initially admitted for a RLTCS  She delivered a viable female  on 21 at 1218  Weight 5lbs 11oz via repeat  section, low transverse incision  Apgars were 9 (1 min) and 9 (5 min)   was transferred to  nursery  Patient tolerated the procedure well and was transferred to recovery in stable condition  Her post-operative course was complicated by  with a Hgb drop from 9 5 --> 6 7  The patient was transfused 1 U pRBC, f/o Hgb was 7 1 --> 7 4  Her postoperative pain was well controlled with oral analgesics  On day of discharge, she was ambulating and able to reasonably perform all ADLs  She was voiding and had appropriate bowel function  Pain was well controlled  She was discharged home on post-operative day #3 without complications  Patient was instructed to follow up with her OB as an outpatient and was given appropriate warnings to call provider if she develops signs of infection or uncontrolled pain  Complications: none apparent    Condition at discharge: good     Discharge instructions/Information to patient and family:   See after visit summary for information provided to patient and family  Provisions for Follow-Up Care:  See after visit summary for information related to follow-up care and any pertinent home health orders        Disposition: Home    Planned Readmission: No    Discharge Medications: For a complete list of the patient's medications, please refer to her med rec      Alice Kaplan MD  PGY-1, OB/GYN  1/21/2021   5:38 PM

## 2021-01-18 NOTE — PLAN OF CARE

## 2021-01-18 NOTE — OP NOTE
Operative Report - OB/GYN   Coit Margaux Santizo 45 y o  female MRN: [id number]  Unit/Bed#:  OR  Encounter: [id number]    Indications: Hx LVCS    Pre-operative Diagnosis:   1  36 week 2 day pregnancy  2  Hx LVCS  3  Anemia  4  Rh negative status     Post-operative Diagnosis: same, delivered    Surgeon: Bayron Sutherland MD    Assistant(s): Adelaida Ortega DO    Findings:  1  Delivery of viable remale on 21 at 1218, weight 5 lbs 11 oz;  Apgar scores of 9 at one minute and 9 at five minutes  Umbilical artery pH 4 224 (base excess -4 3)  2  Normal appearing placenta with centrally-inserted 3 vessel cord  3  Clear amniotic fluid  4  Grossly normal uterus, tubes, and ovaries  5  Minimal intra-abdominal adhesive disease  6  Suspected left-side subcutaneous lipoma versus endometrioma, status-post excision     Specimens:   1  Arterial and venous cord gases  2  Cord blood  3  Segment of umbilical cord  4  Placenta to storage     Quantified blood loss (mL): 528    Drains: Hammonds catheter           Complications:  None; patient tolerated the procedure well  Disposition: PACU            Condition: stable    Procedure Details   Decision was made to proceed with  section due to hx low vertical  section, recommendation for delivery at 36 weeks  Patient was made aware of these findings and the proposed plan  Risks, benefits, possible complications, alternate treatment options, and expected outcomes were discussed with the patient  The patient agreed with the proposed plan and gave informed consent  The patient was taken to the operating room where she was properly identified to the OR staff and attending physician  She received spinal anesthesia preoperatively  Fetal heart tones were appreciated and found to be appropriate  A Hammonds catheter was aseptically inserted and SCDs were placed  The vagina was prepped with betadine and the abdomen was prepped with Chloraprep   Following appropriate drying time, the patient was draped in the usual sterile manner for a Pfannenstiel incision  The patient had received Ancef 2 g IV pre-operatively for prophylaxis  A Time Out was held and the above information confirmed  The patient was identified as Lizette Morocho and the procedure verified as  Delivery  A Pfannenstiel incision was made and carried down through the underlying subcutaneous tissue to the fascia using a scalpel  Rectus fascia was then incised in the midline and extended laterally using James scissors  The superior edge of the fascia was grasped with Kocher clamps, tented upward, and the underlying muscle was bluntly dissected off  The inferior edge was grasped with Kocher clamps and cleared in similar fashion  All anatomic layers were well-demarcated  The rectus muscles were  and the peritoneum was identified and subsequently entered and extended longitudinally with blunt dissection  The bladder blade was inserted  A low transverse uterine incision was made with the scalpel and extended laterally with blunt dissection  The amnion was entered sharply  Surgeons hand was inserted through the hysterotomy and the fetal head was palpated, elevated, and delivered through the uterine incision with the assistance of fundal pressure  Baby had spontaneous cry with good color and tone  The umbilical cord was clamped and cut  The infant was handed off to the  providers  Arterial and venous cord gases, cord blood, and a segment of umbilical cord were obtained for evaluation and promptly sent to the lab  The placenta delivered spontaneously with uterine fundal massage and was noted to have a centrally inserted 3 vessel cord  The uterus was exteriorized and a moist lap sponge was used to clear the cavity of clots and products of conception  The uterine incision was closed with a running locked suture of 0 Vicryl   A second layer of the same suture was used to imbricate the first   Good hemostasis was confirmed upon uterine closure  Warmed normal saline solution was used to irrigate the posterior culdesac and the uterus was returned to the abdomen  The paracolic gutters were inspected and cleared of all clots and debris with moist lap sponges  The fascia was closed with a running suture of 0 Vicryl  Prior to closure of subcutaneous tissues, a suspected left sided lipoma versus endometrioma was excised and sent to pathology for evaluation  The subcutaneous tissues were closed with 2-0 Vicryl suture  The skin was closed with 4-0 Monocryl in a subcuticular fashion  Sterile dressing was applied and an abdominal binder was then placed  At the conclusion of the procedure, all needle, sponge, and instrument counts were noted to be correct x2  The patient tolerated the procedure well and was transferred to her the recovery room in stable condition  Dr Rosa Cisneros and Dr Winter Wolff were present and participated in all key portions of the case      [place], DO

## 2021-01-18 NOTE — H&P
H&P Exam - Obstetrics   [address]  [facility] 45 y o  female MRN: [id number]  Unit/Bed#: LD PACU-03 Encounter: [id number]      ASSESSMENT:  43yo  at 36w2d weeks gestation who is being admitted for a RLTCS and bilateral salpingectomy with a history of T incision  PLAN:    Pregnancy at 36w2d  Admit  Follow up CBC, RPR, Blood Type  Plan for spinal anesthesia   Plan for clindamycin and gentamicin preoperatively     Discussed with Dr Maria Isabel Villaseñor       This patient will be an INPATIENT  and I certify the anticipated length of stay is >2 Midnights  History of Present Illness     Chief Complaint: Here for RLTCS    HPI:  Kelly Rodas is a 45 y o  [id number] female with an ALBERTO of 2021, by Ultrasound at 36w2d weeks gestation who is being admitted for a RLTCS and bilateral salpingectomy  She is feeling well and denies contractions, leakage of fluid,or vaginal bleeding  She has good fetal movement  Her obstetric history is significant for 2 SVDs followed by 1 1LTCS in 2018 for a prolapsed umbilical cord  A T incision was made due to an extremely thick lower uterine segment and difficulty extracting the baby  She is a SWOB patient  PREGNANCY COMPLICATIONS:   1  Prior  x1 with T incision   2  H/o preeclampsia   3   Anemia (9 5)    OB History    Para Term  AB Living   5 3 2 1 1 3   SAB TAB Ectopic Multiple Live Births   1 0 0 0 3      # Outcome Date GA Lbr Enoch/2nd Weight Sex Delivery Anes PTL Lv   5 Current            4  18 34w5d  2300 g (5 lb 1 1 oz) F CS-LVertical Gen Y LELE      Birth Comments: transverse incision T'd      Complications: Prolapse of umbilical cord, single or unspecified fetus   3 SAB 2017     SAB      2 Term 16 38w0d  2381 g (5 lb 4 oz) F Vag-Spont EPI N LELE      Complications: Preeclampsia   1 Term 02 39w0d  3033 g (6 lb 11 oz) F Vag-Spont EPI N LELE       Baby complications/comments: None    Review of Systems   Constitutional: Negative for chills and fever  Eyes: Negative for visual disturbance  Respiratory: Negative for cough and shortness of breath  Cardiovascular: Negative for chest pain, palpitations and leg swelling  Gastrointestinal: Negative for abdominal pain, diarrhea, nausea and vomiting  Genitourinary: Negative for dysuria, hematuria, pelvic pain, vaginal bleeding, vaginal discharge and vaginal pain  Neurological: Negative for dizziness, light-headedness and headaches  Historical Information   Past Medical History:   Diagnosis Date    , missed 10/12/2017    AMA (advanced maternal age) multigravida 33+     Anemia     Anemia     Complication of anesthesia     Deep vein thrombosis ([facility] [address] )     2018    Endometriosis     History of transfusion     Hypertension     Pre-E, and essential    Obesity 3/6/2018    Pre-eclampsia in third trimester     Varicella     had chicken pox     Past Surgical History:   Procedure Laterality Date    APPENDECTOMY      APPENDECTOMY      Last Assessed: 10/12/2017- Laparoscopic      SECTION      EAR SURGERY      Eustachian Tube     EXPLORATORY LAPAROTOMY      for endometriosis    WA  DELIVERY ONLY Bilateral 7/3/2018    Procedure:  SECTION ();   Surgeon: Deanna Bullard DO;  Location: [facility] ;  Service: Obstetrics    TONSILLECTOMY       Social History   Social History     Substance and Sexual Activity   Alcohol Use No    Frequency: Never    Binge frequency: Never     Social History     Substance and Sexual Activity   Drug Use No     Social History     Tobacco Use   Smoking Status Former Smoker    Packs/day: 0 20    Years: 20 00    Pack years: 4 00    Types: Cigarettes    Quit date: 2019    Years since quittin 4   Smokeless Tobacco Never Used     Family History: non-contributory    Meds/Allergies      Medications Prior to Admission   Medication    acetaminophen (TYLENOL) 500 mg tablet    ferrous sulfate 325 (65 Fe) mg tablet  Prenatal Vit-Fe Fum-FA-Omega (Prenatal Multi +DHA) 27-0 8-228 MG CAPS        Allergies   Allergen Reactions    Penicillins Other (See Comments)     Took as a child and had trouble breathing per pt mom    Ciprofloxacin Rash       OBJECTIVE:    Vitals: Blood pressure 139/90, pulse (!) 111, temperature 98 °F (36 7 °C), temperature source Oral, resp  rate 18, height 5' 2" (1 575 m), weight 89 8 kg (198 lb), last menstrual period 06/29/2020, SpO2 99 %, not currently breastfeeding  Body mass index is 36 21 kg/m²  Physical Exam  Constitutional:       General: She is not in acute distress  Appearance: She is well-developed  HENT:      Head: Normocephalic and atraumatic  Cardiovascular:      Rate and Rhythm: Normal rate and regular rhythm  Heart sounds: Normal heart sounds  Pulmonary:      Effort: Pulmonary effort is normal  No respiratory distress  Breath sounds: Normal breath sounds  Abdominal:      Tenderness: There is no abdominal tenderness  There is no guarding  Comments: Gravid uterus   Musculoskeletal:         General: No tenderness  Skin:     General: Skin is warm and dry  Neurological:      Mental Status: She is alert and oriented to person, place, and time  Psychiatric:         Behavior: Behavior normal          Thought Content:  Thought content normal          Fetal heart rate:   Baseline Rate: 140 bpm  Variability: Moderate 6-25 bpm  Accelerations: 15 x 15 or greater  Decelerations: None    [place]:   Contraction Frequency (minutes): 0  Contraction Duration (seconds): -  Contraction Quality: Not applicable        Prenatal Labs:   Blood Type:   Lab Results   Component Value Date/Time    ABO Grouping B 01/18/2021 09:26 AM     , D (Rh type):   Lab Results   Component Value Date/Time    Rh Factor Positive 01/18/2021 09:26 AM   HCT/HGB:   Lab Results   Component Value Date/Time    Hematocrit 27 5 (L) 01/18/2021 09:26 AM    Hemoglobin 9 5 (L) 01/18/2021 09:26 AM      , MCV:   Lab Results   Component Value Date/Time     (H) 01/18/2021 09:26 AM      , Platelets:   Lab Results   Component Value Date/Time    Platelets 469 39/54/6788 09:26 AM      , 1 hour Glucola:   Lab Results   Component Value Date/Time    Glucose 132 06/26/2018 01:07 PM    Rubella:   Lab Results   Component Value Date/Time    Rubella IgG Quant >175 0 06/26/2018 01:07 PM        , VDRL/RPR:   Lab Results   Component Value Date/Time    RPR Non-Reactive 07/03/2018 10:24 PM      , Urine Culture/Screen:   Lab Results   Component Value Date/Time    Urine Culture No Growth <1000 cfu/mL 06/26/2018 01:07 PM    Hep B:   Lab Results   Component Value Date/Time    Hepatitis B Surface Ag Non-reactive 06/26/2018 01:07 PM    HIV:   Lab Results   Component Value Date/Time    HIV-1/HIV-2 Ab Non-Reactive 07/14/2020 02:16 PM     , Chlamydia: Negative  Gonorrhea:   Lab Results   Component Value Date/Time    N gonorrhoeae, DNA Probe Negative 11/27/2020 12:19 PM     , Group B Strep:    Lab Results   Component Value Date/Time    Strep Grp B PCR Positive for Beta Hemolytic Strep Grp B by PCR (A) 03/24/2016 08:56 PM          Invasive Devices     Peripheral Intravenous Line            Peripheral IV 03/09/20 Right Antecubital 314 days    Peripheral IV 01/18/21 Left;Ventral (anterior) Forearm less than 1 day

## 2021-01-18 NOTE — PLAN OF CARE
Problem: BIRTH - VAGINAL/ SECTION  Goal: Fetal and maternal status remain reassuring during the birth process  Description: INTERVENTIONS:  - Monitor vital signs  - Monitor fetal heart rate  - Monitor uterine activity  - Monitor labor progression (vaginal delivery)  - DVT prophylaxis  - Antibiotic prophylaxis  Outcome: Completed  Goal: Emotionally satisfying birthing experience for mother/fetus  Description: Interventions:  - Assess, plan, implement and evaluate the nursing care given to the patient in labor  - Advocate the philosophy that each childbirth experience is a unique experience and support the family's chosen level of involvement and control during the labor process   - Actively participate in both the patient's and family's teaching of the birth process  - Consider cultural, Jew and age-specific factors and plan care for the patient in labor  Outcome: Completed

## 2021-01-19 LAB
ABO GROUP BLD BPU: NORMAL
BPU ID: NORMAL
CROSSMATCH: NORMAL
ERYTHROCYTE [DISTWIDTH] IN BLOOD BY AUTOMATED COUNT: 19.3 % (ref 11.6–15.1)
ERYTHROCYTE [DISTWIDTH] IN BLOOD BY AUTOMATED COUNT: 19.4 % (ref 11.6–15.1)
EXTERNAL GROUP B STREP ANTIGEN: NORMAL
HCT VFR BLD AUTO: 19 % (ref 34.8–46.1)
HCT VFR BLD AUTO: 20.9 % (ref 34.8–46.1)
HGB BLD-MCNC: 6.7 G/DL (ref 11.5–15.4)
HGB BLD-MCNC: 7.1 G/DL (ref 11.5–15.4)
MCH RBC QN AUTO: 34.8 PG (ref 26.8–34.3)
MCH RBC QN AUTO: 36.4 PG (ref 26.8–34.3)
MCHC RBC AUTO-ENTMCNC: 34 G/DL (ref 31.4–37.4)
MCHC RBC AUTO-ENTMCNC: 35.3 G/DL (ref 31.4–37.4)
MCV RBC AUTO: 103 FL (ref 82–98)
MCV RBC AUTO: 103 FL (ref 82–98)
PLATELET # BLD AUTO: 135 THOUSANDS/UL (ref 149–390)
PLATELET # BLD AUTO: 168 THOUSANDS/UL (ref 149–390)
PMV BLD AUTO: 9.3 FL (ref 8.9–12.7)
PMV BLD AUTO: 9.4 FL (ref 8.9–12.7)
RBC # BLD AUTO: 1.84 MILLION/UL (ref 3.81–5.12)
RBC # BLD AUTO: 2.04 MILLION/UL (ref 3.81–5.12)
UNIT DISPENSE STATUS: NORMAL
UNIT PRODUCT CODE: NORMAL
UNIT RH: NORMAL
WBC # BLD AUTO: 7.23 THOUSAND/UL (ref 4.31–10.16)
WBC # BLD AUTO: 9.87 THOUSAND/UL (ref 4.31–10.16)

## 2021-01-19 PROCEDURE — 85027 COMPLETE CBC AUTOMATED: CPT | Performed by: OBSTETRICS & GYNECOLOGY

## 2021-01-19 PROCEDURE — NC001 PR NO CHARGE: Performed by: OBSTETRICS & GYNECOLOGY

## 2021-01-19 PROCEDURE — P9016 RBC LEUKOCYTES REDUCED: HCPCS

## 2021-01-19 RX ORDER — DOCUSATE SODIUM 100 MG/1
100 CAPSULE, LIQUID FILLED ORAL 2 TIMES DAILY
Status: DISCONTINUED | OUTPATIENT
Start: 2021-01-19 | End: 2021-01-21 | Stop reason: HOSPADM

## 2021-01-19 RX ORDER — DIAPER,BRIEF,INFANT-TODD,DISP
1 EACH MISCELLANEOUS DAILY PRN
Status: DISCONTINUED | OUTPATIENT
Start: 2021-01-19 | End: 2021-01-21 | Stop reason: HOSPADM

## 2021-01-19 RX ORDER — ONDANSETRON 2 MG/ML
4 INJECTION INTRAMUSCULAR; INTRAVENOUS EVERY 8 HOURS PRN
Status: DISCONTINUED | OUTPATIENT
Start: 2021-01-19 | End: 2021-01-21 | Stop reason: HOSPADM

## 2021-01-19 RX ORDER — OXYCODONE HYDROCHLORIDE 10 MG/1
10 TABLET ORAL EVERY 4 HOURS PRN
Status: DISCONTINUED | OUTPATIENT
Start: 2021-01-19 | End: 2021-01-21 | Stop reason: HOSPADM

## 2021-01-19 RX ORDER — ACETAMINOPHEN 325 MG/1
650 TABLET ORAL EVERY 4 HOURS PRN
Status: DISCONTINUED | OUTPATIENT
Start: 2021-01-19 | End: 2021-01-21

## 2021-01-19 RX ORDER — IBUPROFEN 600 MG/1
600 TABLET ORAL EVERY 6 HOURS PRN
Status: DISCONTINUED | OUTPATIENT
Start: 2021-01-19 | End: 2021-01-21

## 2021-01-19 RX ORDER — OXYCODONE HYDROCHLORIDE 5 MG/1
5 TABLET ORAL EVERY 4 HOURS PRN
Status: DISCONTINUED | OUTPATIENT
Start: 2021-01-19 | End: 2021-01-21 | Stop reason: HOSPADM

## 2021-01-19 RX ORDER — CALCIUM CARBONATE 200(500)MG
1000 TABLET,CHEWABLE ORAL DAILY PRN
Status: DISCONTINUED | OUTPATIENT
Start: 2021-01-19 | End: 2021-01-21 | Stop reason: HOSPADM

## 2021-01-19 RX ORDER — DIPHENHYDRAMINE HCL 25 MG
25 TABLET ORAL EVERY 6 HOURS PRN
Status: DISCONTINUED | OUTPATIENT
Start: 2021-01-19 | End: 2021-01-21 | Stop reason: HOSPADM

## 2021-01-19 RX ADMIN — OXYCODONE HYDROCHLORIDE 10 MG: 10 TABLET ORAL at 21:20

## 2021-01-19 RX ADMIN — ACETAMINOPHEN 650 MG: 325 TABLET, FILM COATED ORAL at 13:34

## 2021-01-19 RX ADMIN — KETOROLAC TROMETHAMINE 30 MG: 30 INJECTION, SOLUTION INTRAMUSCULAR at 16:29

## 2021-01-19 RX ADMIN — KETOROLAC TROMETHAMINE 30 MG: 30 INJECTION, SOLUTION INTRAMUSCULAR at 02:08

## 2021-01-19 RX ADMIN — OXYCODONE HYDROCHLORIDE AND ACETAMINOPHEN 1 TABLET: 5; 325 TABLET ORAL at 00:03

## 2021-01-19 RX ADMIN — KETOROLAC TROMETHAMINE 30 MG: 30 INJECTION, SOLUTION INTRAMUSCULAR at 23:03

## 2021-01-19 RX ADMIN — ACETAMINOPHEN 650 MG: 325 TABLET, FILM COATED ORAL at 05:55

## 2021-01-19 RX ADMIN — ACETAMINOPHEN 650 MG: 325 TABLET, FILM COATED ORAL at 19:38

## 2021-01-19 RX ADMIN — OXYCODONE HYDROCHLORIDE AND ACETAMINOPHEN 1 TABLET: 5; 325 TABLET ORAL at 11:17

## 2021-01-19 RX ADMIN — KETOROLAC TROMETHAMINE 30 MG: 30 INJECTION, SOLUTION INTRAMUSCULAR at 09:07

## 2021-01-19 NOTE — QUICK NOTE
Subjective:   Patient was seen due to drop in hemoglobin since  delivery last night  Crystal is comfortably sitting up in bed  She has no current complaints except for mild right shoulder pain and incisional discomfort  She denies SOB, chest pain, palpitations, dizziness and lightheadedness       Objective:      Physical exam  Gen: On exam she appears comfortable and well sitting in bed  Well-hydrated  Cariovascular: radial and PT pulses 2+ bilaterally, normal heart sounds with no murmurs, rubs or gallops, regular rate and rhythm   Pulm: Lung sounds clear to auscultation bilaterally  Abd: tender to palpation in lower abdomen consistent with post-operative tenderness  No rebound, psoas sign negative  Abdominal incision is clean and dry, non-erythematous  No other bruising or tenderness noted  Extremities: Mild non-pitting edema in the legs bilaterally consistent with pregnant state  Urinary:  Hammonds still in place, saritha in color  Vaginal: Has noted about half a pad's worth of blood since surgery  Vitals          Vitals:     21 1745 21 1847 21 0040   BP: 129/67 140/73 139/81 112/71   BP Location: Right arm Right arm Right arm Left arm   Pulse: 89 97 99 93   Resp: 18 20 20 18   Temp: 97 8 °F (36 6 °C) 98 2 °F (36 8 °C) 97 5 °F (36 4 °C) 98 °F (36 7 °C)   TempSrc: Oral Oral Oral Oral   SpO2: 100% 100% 99% 99%   Weight:           Height:                   Plan:              - hemodynamically stable with no signs of acute blood loss              - has known history of macrocytic anemia and has received a blood transfusion with her last birth              - plan to transfuse 1 unit pRBCs               - recommend follow-up with hematology outpatient for chronic anemia

## 2021-01-19 NOTE — PROGRESS NOTES
Progress Note - OB/GYN   Jaja Santizo 45 y o  female MRN: [id number]  Unit/Bed#: -01 Encounter: [id number]    Assessment:  45 y o  [id number] s/p Repeat low transverse  section and Tubal Ligation post-operative day 1  Pregnancy c/b h/o PreE, h/o T-incision (in 2018), late presentation to care @28w  Patient recovering well, Stable    Plan:  1  Postpartum  Continue routine post partum care  Pain management PRN  Encourage ambulation    2  H/o PreE  117-144/56-88 since delivery    3    Hgb 9 5 --> 6 7 (@0200) --> 1u pRBC --> AM CBC pending  UOP 1 95 cc/kg/hr  VT today    4   Discharge  Anticipate d/c POD #3/4      Subjective/Objective   Chief Complaint:    Postpartum state    Subjective:   Pain: yes, cramping, improved with meds  Tolerating PO: yes  Voiding: yes  Flatus: yes  BM: no  Ambulating: yes  Breastfeeding:  no  Chest pain: no  Shortness of breath: no  Leg pain: no  Lochia: minimal    Objective:     Vitals: Temp:  [97 5 °F (36 4 °C)-98 2 °F (36 8 °C)] 98 2 °F (36 8 °C)  HR:  [] 99  Resp:  [16-20] 18  BP: (112-144)/(56-90) 119/69       Intake/Output Summary (Last 24 hours) at 2021 8612  Last data filed at 2021 7962  Gross per 24 hour   Intake 3356 25 ml   Output 2503 ml   Net 853 25 ml         Physical Exam:   General: NAD, alert, oriented  Cardio: Regular rate and rhythm, no murmur  Resp: nonlabored breathing, clear to auscultation bilaterally  Abdomen: Soft, no distension/rebound/guarding/tenderness   Fundus: Firm, non-tender, fundus: at umbilicus  Incision: C/D/I  G/U: Minimal lochia noted on pad  Lower Extremities: Non-tender, no palpable cords    Medications:  Current Facility-Administered Medications   Medication Dose Route Frequency    acetaminophen (TYLENOL) tablet 650 mg  650 mg Oral Q6H    dexamethasone (DECADRON) injection 8 mg  8 mg Intravenous Once PRN    diphenhydrAMINE (BENADRYL) injection 25 mg  25 mg Intravenous Q6H PRN    fentaNYL (SUBLIMAZE) injection 50 mcg  50 mcg Intravenous Q3 min PRN    HYDROmorphone (DILAUDID) injection 0 5 mg  0 5 mg Intravenous Q5 Min PRN    hydrOXYzine HCL (ATARAX) tablet 25 mg  25 mg Oral Q6H PRN    ketorolac (TORADOL) injection 30 mg  30 mg Intravenous Q6H [facility]    lactated ringers infusion  125 mL/hr Intravenous Continuous    metoclopramide (REGLAN) injection 10 mg  10 mg Intravenous Once PRN    metoclopramide (REGLAN) injection 5 mg  5 mg Intravenous Q6H PRN    naloxone (NARCAN) injection 0 1 mg  0 1 mg Intravenous Q3 min PRN    ondansetron (ZOFRAN) injection 4 mg  4 mg Intravenous Q4H PRN    oxyCODONE-acetaminophen (PERCOCET) 5-325 mg per tablet 1 tablet  1 tablet Oral Q6H PRN       Labs:   Recent Results (from the past 24 hour(s))   CBC    Collection Time: 01/18/21  9:26 AM   Result Value Ref Range    WBC 13 33 (H) 4 31 - 10 16 Thousand/uL    RBC 2 71 (L) 3 81 - 5 12 Million/uL    Hemoglobin 9 5 (L) 11 5 - 15 4 g/dL    Hematocrit 27 5 (L) 34 8 - 46 1 %     (H) 82 - 98 fL    MCH 35 1 (H) 26 8 - 34 3 pg    MCHC 34 5 31 4 - 37 4 g/dL    RDW 19 2 (H) 11 6 - 15 1 %    Platelets 626 871 - 593 Thousands/uL    MPV 9 5 8 9 - 12 7 fL   RPR    Collection Time: 01/18/21  9:26 AM   Result Value Ref Range    RPR Non-Reactive Non-Reactive   Type and screen and continue to monitor patient    Collection Time: 01/18/21  9:26 AM   Result Value Ref Range    ABO Grouping B     Rh Factor Positive     Antibody Screen Negative     Specimen Expiration Date [id number]    Comprehensive metabolic panel    Collection Time: 01/18/21  9:26 AM   Result Value Ref Range    Sodium 138 136 - 145 mmol/L    Potassium 3 2 (L) 3 5 - 5 3 mmol/L    Chloride 103 100 - 108 mmol/L    CO2 23 21 - 32 mmol/L    ANION GAP 12 4 - 13 mmol/L    BUN 6 5 - 25 mg/dL    Creatinine 0 42 (L) 0 60 - 1 30 mg/dL    Glucose 92 65 - 140 mg/dL    Calcium 8 5 8 3 - 10 1 mg/dL    Corrected Calcium 9 4 8 3 - 10 1 mg/dL    AST 8 5 - 45 U/L    ALT 11 (L) 12 - 78 U/L    Alkaline Phosphatase 126 (H) 46 - 116 U/L    Total Protein 7 0 6 4 - 8 2 g/dL    Albumin 2 9 (L) 3 5 - 5 0 g/dL    Total Bilirubin 0 76 0 20 - 1 00 mg/dL    eGFR 130 ml/min/1 73sq m   Hepatitis B surface antigen    Collection Time: 01/18/21  9:26 AM   Result Value Ref Range    Hepatitis B Surface Ag Non-reactive Non-reactive, NonReactive - Confirmed   Rubella antibody, IgG    Collection Time: 01/18/21  9:26 AM   Result Value Ref Range    Rubella IgG Quant >175 0 >9 9 IU/mL   Blood gas, venous, cord    Collection Time: 01/18/21 12:19 PM   Result Value Ref Range    pH, Cord Jaime 7 331 7 190 - 7 490    pCO2, Cord Jaime 45 9 (H) 27 0 - 43 0 mm HG    pO2, Cord Jaime 20 4 15 0 - 45 0 mm HG    HCO3, Cord Jaime 23 7 12 2 - 28 6 mmol/L    Base Exc, Cord Jaime -2 4 (L) 1 0 - 9 0 mmol/L    O2 Cont, Cord Jaime 10 1 mL/dL    O2 HGB,VENOUS CORD 48 5 %   Blood gas, arterial, cord    Collection Time: 01/18/21 12:19 PM   Result Value Ref Range    pH, Cord Art 7 310 7 230 - 7 430    pCO2, Cord Art 44 6 30 0 - 60 0    pO2, Cord Art 17 7 5 0 - 25 0 mm HG    HCO3, Cord Art 22 0 17 3 - 27 3 mmol/L    Base Exc, Cord Art -4 3 (L) 3 0 - 11 0 mmol/L    O2 Content, Cord Art 8 0 ml/dl    O2 Hgb, Arterial Cord 39 7 %   CBC    Collection Time: 01/19/21  1:52 AM   Result Value Ref Range    WBC 9 87 4 31 - 10 16 Thousand/uL    RBC 1 84 (L) 3 81 - 5 12 Million/uL    Hemoglobin 6 7 (LL) 11 5 - 15 4 g/dL    Hematocrit 19 0 (L) 34 8 - 46 1 %     (H) 82 - 98 fL    MCH 36 4 (H) 26 8 - 34 3 pg    MCHC 35 3 31 4 - 37 4 g/dL    RDW 19 3 (H) 11 6 - 15 1 %    Platelets 009 248 - 915 Thousands/uL    MPV 9 4 8 9 - 12 7 fL   Prepare Leukoreduced RBC: 1 Units    Collection Time: 01/19/21  5:47 AM   Result Value Ref Range    Unit Product Code B6262V00     Unit Number [id number]     Unit ABO B     Unit [facility] NEG     Crossmatch Compatible     Unit Dispense Status Presumed [address] [facility] [address]  Ob/Gyn PGY-1  1/19/2021  7:07 AM

## 2021-01-19 NOTE — QUICK NOTE
Subjective:   Patient was seen due to drop in hemoglobin since  delivery last night  Crystal is comfortably sitting up in bed  She has no current complaints except for mild right shoulder pain and incisional discomfort  She denies SOB, chest pain, palpitations, dizziness and lightheadedness  Objective:     Physical exam  Gen: On exam she appears comfortable and well sitting in bed  Well-hydrated  Cariovascular: radial and PT pulses 2+ bilaterally, normal heart sounds with no murmurs, rubs or gallops, regular rate and rhythm   Pulm: Lung sounds clear to auscultation bilaterally  Abd: tender to palpation in lower abdomen consistent with post-operative tenderness  No rebound, psoas sign negative  Abdominal incision is clean and dry, non-erythematous  No other bruising or tenderness noted  Extremities: Mild non-pitting edema in the legs bilaterally consistent with pregnant state  Urinary:  Hammonds still in place, saritha in color  Vaginal: Has noted about half a pad's worth of blood since surgery  Vitals:    21 1745 21 1847 21 2005 21 0040   BP: 129/67 140/73 139/81 112/71   BP Location: Right arm Right arm Right arm Left arm   Pulse: 89 97 99 93   Resp: 18  18   Temp: 97 8 °F (36 6 °C) 98 2 °F (36 8 °C) 97 5 °F (36 4 °C) 98 °F (36 7 °C)   TempSrc: Oral Oral Oral Oral   SpO2: 100% 100% 99% 99%   Weight:       Height:           Plan:   - hemodynamically stable with no signs of acute blood loss   - has known history of macrocytic anemia and has received a blood transfusion with her last birth   - plan to transfuse 1 unit pRBCs    - recommend follow-up with hematology outpatient for chronic anemia

## 2021-01-20 LAB
ERYTHROCYTE [DISTWIDTH] IN BLOOD BY AUTOMATED COUNT: 19.7 % (ref 11.6–15.1)
HCT VFR BLD AUTO: 22.5 % (ref 34.8–46.1)
HGB BLD-MCNC: 7.4 G/DL (ref 11.5–15.4)
MCH RBC QN AUTO: 33.9 PG (ref 26.8–34.3)
MCHC RBC AUTO-ENTMCNC: 32.9 G/DL (ref 31.4–37.4)
MCV RBC AUTO: 103 FL (ref 82–98)
PLATELET # BLD AUTO: 140 THOUSANDS/UL (ref 149–390)
PMV BLD AUTO: 9.4 FL (ref 8.9–12.7)
RBC # BLD AUTO: 2.18 MILLION/UL (ref 3.81–5.12)
WBC # BLD AUTO: 7.81 THOUSAND/UL (ref 4.31–10.16)

## 2021-01-20 PROCEDURE — NC001 PR NO CHARGE: Performed by: OBSTETRICS & GYNECOLOGY

## 2021-01-20 PROCEDURE — 85027 COMPLETE CBC AUTOMATED: CPT | Performed by: OBSTETRICS & GYNECOLOGY

## 2021-01-20 RX ORDER — MEDROXYPROGESTERONE ACETATE 150 MG/ML
150 INJECTION, SUSPENSION INTRAMUSCULAR ONCE
Status: DISCONTINUED | OUTPATIENT
Start: 2021-01-20 | End: 2021-01-21 | Stop reason: HOSPADM

## 2021-01-20 RX ADMIN — OXYCODONE HYDROCHLORIDE 5 MG: 5 TABLET ORAL at 08:41

## 2021-01-20 RX ADMIN — IBUPROFEN 600 MG: 600 TABLET ORAL at 16:13

## 2021-01-20 RX ADMIN — DOCUSATE SODIUM 100 MG: 100 CAPSULE, LIQUID FILLED ORAL at 16:14

## 2021-01-20 RX ADMIN — OXYCODONE HYDROCHLORIDE 10 MG: 10 TABLET ORAL at 18:53

## 2021-01-20 RX ADMIN — DOCUSATE SODIUM 100 MG: 100 CAPSULE, LIQUID FILLED ORAL at 07:47

## 2021-01-20 RX ADMIN — ACETAMINOPHEN 650 MG: 325 TABLET, FILM COATED ORAL at 16:14

## 2021-01-20 RX ADMIN — OXYCODONE HYDROCHLORIDE 10 MG: 10 TABLET ORAL at 13:16

## 2021-01-20 RX ADMIN — OXYCODONE HYDROCHLORIDE 10 MG: 10 TABLET ORAL at 04:22

## 2021-01-20 NOTE — PROGRESS NOTES
Progress Note - OB/GYN   Eddlaurenbossman Santizo 45 y o  female MRN: [id number]  Unit/Bed#: -01 Encounter: [id number]    Assessment:  45 y o  [id number] s/p Repeat low transverse  section and Tubal Ligation post-operative day 2  Pregnancy c/b h/o PreE, h/o T-incision (in 2018), late presentation to care @28w  Patient recovering well, Stable     Plan:  1  Postpartum  Continue routine post partum care  Pain management PRN  Encourage ambulation  Patient desires BTL with Depo bridge     2  H/o PreE  BP in last 24 hours 119-155/63-86     3    Hgb 9 5 --> 6 7 (@0200) --> 1u pRBC --> 7 1 --> 7 4  Voiding     4  Discharge  Anticipate early d/c      Subjective/Objective   Chief Complaint:    Postpartum state    Subjective:   Pain: yes, cramping, improved with meds  Tolerating PO: yes  Voiding: yes  Flatus: yes  BM: no  Ambulating: yes  Breastfeeding:  yes  Chest pain: no  Shortness of breath: no  Leg pain: no  Lochia: minimal    Objective:     Vitals: Temp:  [97 6 °F (36 4 °C)-98 4 °F (36 9 °C)] 98 2 °F (36 8 °C)  HR:  [] 99  Resp:  [18-19] 18  BP: (121-155)/(63-86) 148/86       Intake/Output Summary (Last 24 hours) at 2021 0617  Last data filed at 2021 1301  Gross per 24 hour   Intake    Output 1400 ml   Net -1400 ml         Physical Exam:   General: NAD, alert, oriented  Cardio: Regular rate and rhythm, no murmur  Resp: nonlabored breathing, clear to auscultation bilaterally  Abdomen: Soft, no distension/rebound/guarding/tenderness   Fundus: Firm, non-tender, fundus:   At umbilicus  Incision: C/D/I  G/U:  Minimal lochia noted on pad  Lower Extremities: Non-tender, no palpable cords    Medications:  Current Facility-Administered Medications   Medication Dose Route Frequency    acetaminophen (TYLENOL) tablet 650 mg  650 mg Oral Q6H    acetaminophen (TYLENOL) tablet 650 mg  650 mg Oral Q4H PRN    benzocaine-menthol-lanolin-aloe (DERMOPLAST) 20-0 5 % topical spray 1 application  1 application Topical Q6H PRN    calcium carbonate (TUMS) chewable tablet 1,000 mg  1,000 mg Oral Daily PRN    diphenhydrAMINE (BENADRYL) tablet 25 mg  25 mg Oral Q6H PRN    docusate sodium (COLACE) capsule 100 mg  100 mg Oral BID    hydrocortisone 1 % cream 1 application  1 application Topical Daily PRN    hydrOXYzine HCL (ATARAX) tablet 25 mg  25 mg Oral Q6H PRN    ibuprofen (MOTRIN) tablet 600 mg  600 mg Oral Q6H PRN    lactated ringers infusion  125 mL/hr Intravenous Continuous    ondansetron (ZOFRAN) injection 4 mg  4 mg Intravenous Q8H PRN    oxyCODONE (ROXICODONE) immediate release tablet 10 mg  10 mg Oral Q4H PRN    oxyCODONE (ROXICODONE) IR tablet 5 mg  5 mg Oral Q4H PRN    oxyCODONE-acetaminophen (PERCOCET) 5-325 mg per tablet 1 tablet  1 tablet Oral Q6H PRN    witch hazel-glycerin (TUCKS) topical pad 1 pad  1 pad Topical Q4H PRN       Labs:   Recent Results (from the past 24 hour(s))   CBC    Collection Time: 01/19/21  8:21 AM   Result Value Ref Range    WBC 7 23 4 31 - 10 16 Thousand/uL    RBC 2 04 (L) 3 81 - 5 12 Million/uL    Hemoglobin 7 1 (L) 11 5 - 15 4 g/dL    Hematocrit 20 9 (L) 34 8 - 46 1 %     (H) 82 - 98 fL    MCH 34 8 (H) 26 8 - 34 3 pg    MCHC 34 0 31 4 - 37 4 g/dL    RDW 19 4 (H) 11 6 - 15 1 %    Platelets 105 (L) 825 - 390 Thousands/uL    MPV 9 3 8 9 - 12 7 fL   CBC and Platelet    Collection Time: 01/20/21  5:27 AM   Result Value Ref Range    WBC 7 81 4 31 - 10 16 Thousand/uL    RBC 2 18 (L) 3 81 - 5 12 Million/uL    Hemoglobin 7 4 (L) 11 5 - 15 4 g/dL    Hematocrit 22 5 (L) 34 8 - 46 1 %     (H) 82 - 98 fL    MCH 33 9 26 8 - 34 3 pg    MCHC 32 9 31 4 - 37 4 g/dL    RDW 19 7 (H) 11 6 - 15 1 %    Platelets 392 (L) 725 - 390 Thousands/uL    MPV 9 4 8 9 - 12 7 fL         Carmelita Whitman  Ob/Gyn PGY-1  1/20/2021  6:17 AM

## 2021-01-21 VITALS
BODY MASS INDEX: 36.44 KG/M2 | TEMPERATURE: 98.3 F | WEIGHT: 198 LBS | HEIGHT: 62 IN | HEART RATE: 96 BPM | SYSTOLIC BLOOD PRESSURE: 134 MMHG | DIASTOLIC BLOOD PRESSURE: 64 MMHG | RESPIRATION RATE: 20 BRPM | OXYGEN SATURATION: 100 %

## 2021-01-21 PROCEDURE — NC001 PR NO CHARGE: Performed by: OBSTETRICS & GYNECOLOGY

## 2021-01-21 RX ORDER — IBUPROFEN 600 MG/1
600 TABLET ORAL EVERY 6 HOURS PRN
Qty: 30 TABLET | Refills: 0 | Status: CANCELLED
Start: 2021-01-21

## 2021-01-21 RX ORDER — SIMETHICONE 80 MG
80 TABLET,CHEWABLE ORAL EVERY 6 HOURS PRN
Status: DISCONTINUED | OUTPATIENT
Start: 2021-01-21 | End: 2021-01-21 | Stop reason: HOSPADM

## 2021-01-21 RX ORDER — DOCUSATE SODIUM 100 MG/1
100 CAPSULE, LIQUID FILLED ORAL 2 TIMES DAILY
Qty: 10 CAPSULE | Refills: 0
Start: 2021-01-21

## 2021-01-21 RX ORDER — IBUPROFEN 600 MG/1
600 TABLET ORAL EVERY 6 HOURS
Qty: 30 TABLET | Refills: 3 | Status: SHIPPED | OUTPATIENT
Start: 2021-01-21

## 2021-01-21 RX ORDER — ACETAMINOPHEN 325 MG/1
650 TABLET ORAL EVERY 4 HOURS
Status: DISCONTINUED | OUTPATIENT
Start: 2021-01-21 | End: 2021-01-21 | Stop reason: HOSPADM

## 2021-01-21 RX ORDER — IBUPROFEN 600 MG/1
600 TABLET ORAL EVERY 6 HOURS
Status: DISCONTINUED | OUTPATIENT
Start: 2021-01-21 | End: 2021-01-21 | Stop reason: HOSPADM

## 2021-01-21 RX ORDER — OXYCODONE HYDROCHLORIDE 5 MG/1
5 TABLET ORAL EVERY 4 HOURS PRN
Qty: 10 TABLET | Refills: 0 | Status: SHIPPED | OUTPATIENT
Start: 2021-01-21 | End: 2021-01-31

## 2021-01-21 RX ADMIN — OXYCODONE HYDROCHLORIDE 10 MG: 10 TABLET ORAL at 06:23

## 2021-01-21 RX ADMIN — ACETAMINOPHEN 650 MG: 325 TABLET, FILM COATED ORAL at 02:53

## 2021-01-21 RX ADMIN — OXYCODONE HYDROCHLORIDE 10 MG: 10 TABLET ORAL at 16:50

## 2021-01-21 RX ADMIN — DOCUSATE SODIUM 100 MG: 100 CAPSULE, LIQUID FILLED ORAL at 09:55

## 2021-01-21 RX ADMIN — IBUPROFEN 600 MG: 600 TABLET ORAL at 02:53

## 2021-01-21 RX ADMIN — OXYCODONE HYDROCHLORIDE 10 MG: 10 TABLET ORAL at 11:47

## 2021-01-21 RX ADMIN — IBUPROFEN 600 MG: 600 TABLET ORAL at 09:54

## 2021-01-21 RX ADMIN — ACETAMINOPHEN 650 MG: 325 TABLET, FILM COATED ORAL at 09:54

## 2021-01-21 RX ADMIN — DOCUSATE SODIUM 100 MG: 100 CAPSULE, LIQUID FILLED ORAL at 16:49

## 2021-01-21 RX ADMIN — OXYCODONE HYDROCHLORIDE 10 MG: 10 TABLET ORAL at 00:00

## 2021-01-21 RX ADMIN — SIMETHICONE 80 MG: 80 TABLET, CHEWABLE ORAL at 09:55

## 2021-01-21 NOTE — PROGRESS NOTES
Progress Note - OB/GYN   Nelia Lashon Santizo 45 y o  female MRN: [id number]  Unit/Bed#: -01 Encounter: [id number]    Assessment:  45 y o   s/p Repeat low transverse  section post-operative day 3  Pregnancy c/b h/o PreE, h/o T-incision (in 2018), late presentation to care @28w  Patient recovering well, Stable     Plan:  1   Postpartum  Continue routine post partum care  Pain management PRN --> change to scheduled; add simethicone for gas pain  Encourage ambulation  Patient desires BTL with Depo bridge     2  H/o PreE  Patient with a severe pressure 165/85 @0034   No 15 min re-check, 3 hour re-check 142/71  Otherwise, BP in last 24 hours 138-142/71-85  (CBC, CMP WNL, no U P:C collected)     3    Hgb 9 5 --> 6 7 (@0200) --> 1u pRBC --> 7 1 --> 7 4  Voiding     4  Discharge  Anticipate d/c today      Subjective/Objective   Chief Complaint:    Postpartum state    Subjective:   Pain: yes, cramping, improved with meds  Tolerating PO: yes  Voiding: yes  Flatus: yes  BM: no  Ambulating: yes  Breastfeeding:  yes  Chest pain: no  Shortness of breath: no  Leg pain: no  Lochia: minimal    Objective:     Vitals: Temp:  [98 1 °F (36 7 °C)-98 2 °F (36 8 °C)] 98 1 °F (36 7 °C)  HR:  [] 99  Resp:  [18-20] 18  BP: (138-165)/(71-85) 142/71     No intake or output data in the 24 hours ending 21 0615      Physical Exam:   General: NAD, alert, oriented  Cardio: Regular rate and rhythm, no murmur  Resp: nonlabored breathing, clear to auscultation bilaterally  Abdomen: Soft, no distension/rebound/guarding/tenderness   Fundus: Firm, non-tender, fundus: U-1  Incision: C/D/I  G/U: Minimal lochia noted on pad  Lower Extremities: Non-tender, no palpable cords    Medications:  Current Facility-Administered Medications   Medication Dose Route Frequency    acetaminophen (TYLENOL) tablet 650 mg  650 mg Oral Q6H    acetaminophen (TYLENOL) tablet 650 mg  650 mg Oral Q4H PRN    benzocaine-menthol-lanolin-aloe (DERMOPLAST) 20-0 5 % topical spray 1 application  1 application Topical C7P PRN    calcium carbonate (TUMS) chewable tablet 1,000 mg  1,000 mg Oral Daily PRN    diphenhydrAMINE (BENADRYL) tablet 25 mg  25 mg Oral Q6H PRN    docusate sodium (COLACE) capsule 100 mg  100 mg Oral BID    hydrocortisone 1 % cream 1 application  1 application Topical Daily PRN    hydrOXYzine HCL (ATARAX) tablet 25 mg  25 mg Oral Q6H PRN    ibuprofen (MOTRIN) tablet 600 mg  600 mg Oral Q6H PRN    lactated ringers infusion  125 mL/hr Intravenous Continuous    medroxyPROGESTERone (DEPO-PROVERA) IM injection 150 mg  150 mg Intramuscular Once    ondansetron (ZOFRAN) injection 4 mg  4 mg Intravenous Q8H PRN    oxyCODONE (ROXICODONE) immediate release tablet 10 mg  10 mg Oral Q4H PRN    oxyCODONE (ROXICODONE) IR tablet 5 mg  5 mg Oral Q4H PRN    oxyCODONE-acetaminophen (PERCOCET) 5-325 mg per tablet 1 tablet  1 tablet Oral Q6H PRN    witch hazel-glycerin (TUCKS) topical pad 1 pad  1 pad Topical Q4H PRN       Labs:   No results found for this or any previous visit (from the past 24 hour(s))        Navi Edmondson  Ob/Gyn PGY-1  1/21/2021  6:15 AM

## 2021-01-21 NOTE — DISCHARGE INSTRUCTIONS
Medroxyprogesterone (By injection)   Medroxyprogesterone ([id number]-wx-[id number]-ERICA-ter-one)  Prevents pregnancy  Also treats endometriosis and is used with other medicines to help relieve symptoms of cancer, including uterine or kidney cancer  Brand Name(s): Depo-Provera, Depo-Provera Contraceptive, Depo-SubQ Provera 104, medroxyPROGESTERone acetate Novaplus   There may be other brand names for this medicine  When This Medicine Should Not Be Used: This medicine is not right for everyone  You should not receive it if you had an allergic reaction to medroxyprogesterone or if you have a history of breast cancer or blood clots (including heart attack or stroke)  In most cases, you should not use this medicine while you are pregnant  How to Use This Medicine:   Injectable  · A nurse or other health provider will give you this medicine  This medicine is given as a shot into a muscle or just under the skin  · Your exact treatment schedule depends on the reason you are using this medicine  You doctor will explain your personal schedule  ? For treatment of cancer symptoms, you may start with a shot once per week  You may need fewer shots as your treatment goes forward  ? For birth control or endometriosis, you will need a shot every 3 months (13 weeks)  ? You might need to have the first shot during the first 5 days of your normal menstrual period, to make sure you are not pregnant  If you have just had a baby, you may receive a shot 5 days after birth if you are not breastfeeding or 6 weeks after birth if you are breastfeeding  · Read and follow the patient instructions that come with this medicine  Talk to your doctor or pharmacist if you have any questions  · Missed dose: You must receive a shot every 3 months if you want to prevent pregnancy  Talk to your doctor or pharmacist if you do not receive your medicine on time, because you may need another form of birth control    Drugs and Foods to Avoid:   Ask your doctor or pharmacist before using any other medicine, including over-the-counter medicines, vitamins, and herbal products  · Some medicines can affect how medroxyprogesterone works  Tell your doctor if you are using any of the following:  ? Aminoglutethimide, bosentan, carbamazepine, felbamate, griseofulvin, mitotane, modafinil, nefazodone, oxcarbazepine, phenobarbital, phenytoin, rifabutin, rifampin, rifapentine, Mariel's wort, topiramate  ? Medicine to treat an infection (including clarithromycin, itraconazole, ketoconazole, telithromycin, voriconazole)  ? Medicine to treat HIV/AIDS (including atazanavir, efavirenz, indinavir, nelfinavir, ritonavir, saquinavir)  Warnings While Using This Medicine:   · Tell your doctor right away if you think you have become pregnant  · Tell your doctor if you are breastfeeding, or if you have kidney disease, liver disease, asthma, diabetes, heart disease, seizures, migraine headaches, an eating disorder, osteoporosis, or a history of depression  Tell your doctor if you smoke  · This medicine may cause the following problems:  ? Blood clots, which could lead to stroke, heart attack, or other serious problems  ? Possible increased risk of breast cancer  ? Weak or thin bones, especially with long-term use  · You should not use this medicine for long-term birth control unless you cannot use any other form of birth control  · This medicine will not protect you from HIV/AIDS or other sexually transmitted diseases  · Tell any doctor or dentist who treats you that you are using this medicine  This medicine may affect certain medical test results  · Your doctor will check your progress and the effects of this medicine at regular visits  Keep all appointments    Possible Side Effects While Using This Medicine:   Call your doctor right away if you notice any of these side effects:  · Allergic reaction: Itching or hives, swelling in your face or hands, swelling or tingling in your mouth or throat, chest tightness, trouble breathing  · Chest pain, trouble breathing, or coughing up blood  · Dark urine or pale stools, nausea, vomiting, loss of appetite, stomach pain, yellow skin or eyes  · Heavy or nonstop vaginal bleeding  · Loss of vision, double vision  · Numbness or weakness on one side of your body, sudden or severe headache, problems with vision, speech, or walking  · Severe stomach pain or cramps  If you notice these less serious side effects, talk with your doctor:   · Headache  · Light or missed monthly periods, spotting between periods  · Nervousness or dizziness  · Pain, redness, burning, swelling, or a lump under your skin where the shot was given  · Weight gain  If you notice other side effects that you think are caused by this medicine, tell your doctor  Call your doctor for medical advice about side effects  You may report side effects to FDA at 9-300-FDA-2491  © Copyright [address] [place] [address] Information is for End User's use only and may not be sold, redistributed or otherwise used for commercial purposes  The above information is an  only  It is not intended as medical advice for individual conditions or treatments  Talk to your doctor, nurse or pharmacist before following any medical regimen to see if it is safe and effective for you      Medroxyprogesterone (By injection)   Medroxyprogesterone (rv-xwxk-va-proe-ERICA-ter-one)  Prevents pregnancy  Also treats endometriosis and is used with other medicines to help relieve symptoms of cancer, including uterine or kidney cancer  Brand Name(s): Depo-Provera, Depo-Provera Contraceptive, Depo-SubQ Provera 104, medroxyPROGESTERone acetate Novaplus   There may be other brand names for this medicine  When This Medicine Should Not Be Used: This medicine is not right for everyone   You should not receive it if you had an allergic reaction to medroxyprogesterone or if you have a history of breast cancer or blood clots (including heart attack or stroke)  In most cases, you should not use this medicine while you are pregnant  How to Use This Medicine:   Injectable  · A nurse or other health provider will give you this medicine  This medicine is given as a shot into a muscle or just under the skin  · Your exact treatment schedule depends on the reason you are using this medicine  You doctor will explain your personal schedule  ? For treatment of cancer symptoms, you may start with a shot once per week  You may need fewer shots as your treatment goes forward  ? For birth control or endometriosis, you will need a shot every 3 months (13 weeks)  ? You might need to have the first shot during the first 5 days of your normal menstrual period, to make sure you are not pregnant  If you have just had a baby, you may receive a shot 5 days after birth if you are not breastfeeding or 6 weeks after birth if you are breastfeeding  · Read and follow the patient instructions that come with this medicine  Talk to your doctor or pharmacist if you have any questions  · Missed dose: You must receive a shot every 3 months if you want to prevent pregnancy  Talk to your doctor or pharmacist if you do not receive your medicine on time, because you may need another form of birth control  Drugs and Foods to Avoid:   Ask your doctor or pharmacist before using any other medicine, including over-the-counter medicines, vitamins, and herbal products  · Some medicines can affect how medroxyprogesterone works  Tell your doctor if you are using any of the following:  ? Aminoglutethimide, bosentan, carbamazepine, felbamate, griseofulvin, mitotane, modafinil, nefazodone, oxcarbazepine, phenobarbital, phenytoin, rifabutin, rifampin, rifapentine, Mariel's wort, topiramate  ? Medicine to treat an infection (including clarithromycin, itraconazole, ketoconazole, telithromycin, voriconazole)  ?  Medicine to treat HIV/AIDS (including atazanavir, efavirenz, indinavir, nelfinavir, ritonavir, saquinavir)  Warnings While Using This Medicine:   · Tell your doctor right away if you think you have become pregnant  · Tell your doctor if you are breastfeeding, or if you have kidney disease, liver disease, asthma, diabetes, heart disease, seizures, migraine headaches, an eating disorder, osteoporosis, or a history of depression  Tell your doctor if you smoke  · This medicine may cause the following problems:  ? Blood clots, which could lead to stroke, heart attack, or other serious problems  ? Possible increased risk of breast cancer  ? Weak or thin bones, especially with long-term use  · You should not use this medicine for long-term birth control unless you cannot use any other form of birth control  · This medicine will not protect you from HIV/AIDS or other sexually transmitted diseases  · Tell any doctor or dentist who treats you that you are using this medicine  This medicine may affect certain medical test results  · Your doctor will check your progress and the effects of this medicine at regular visits  Keep all appointments    Possible Side Effects While Using This Medicine:   Call your doctor right away if you notice any of these side effects:  · Allergic reaction: Itching or hives, swelling in your face or hands, swelling or tingling in your mouth or throat, chest tightness, trouble breathing  · Chest pain, trouble breathing, or coughing up blood  · Dark urine or pale stools, nausea, vomiting, loss of appetite, stomach pain, yellow skin or eyes  · Heavy or nonstop vaginal bleeding  · Loss of vision, double vision  · Numbness or weakness on one side of your body, sudden or severe headache, problems with vision, speech, or walking  · Severe stomach pain or cramps  If you notice these less serious side effects, talk with your doctor:   · Headache  · Light or missed monthly periods, spotting between periods  · Nervousness or dizziness  · Pain, redness, burning, swelling, or a lump under your skin where the shot was given  · Weight gain  If you notice other side effects that you think are caused by this medicine, tell your doctor  Call your doctor for medical advice about side effects  You may report side effects to FDA at 1-593-NIE-4163  © Copyright [address] Information is for End User's use only and may not be sold, redistributed or otherwise used for commercial purposes  The above information is an  only  It is not intended as medical advice for individual conditions or treatments  Talk to your doctor, nurse or pharmacist before following any medical regimen to see if it is safe and effective for you           WHAT YOU NEED TO KNOW:   A , or  section, is abdominal surgery to deliver your baby  DISCHARGE INSTRUCTIONS:   Call your local emergency number (911 in the [address] [place] [address],[address]) if:   · You feel lightheaded, short of breath, and have chest pain  · You cough up blood  Call your obstetrician if:   · Blood soaks through your bandage  · Your stitches come apart  · Your arm or leg feels warm, tender, and painful  It may look swollen and red  · You have heavy vaginal bleeding that fills 1 or more sanitary pads in 1 hour  · You have a fever  · Your incision is swollen, red, or draining pus  · You have questions or concerns about yourself or your baby  Medicines: You may  need any of the following:  · Prescription pain medicine  may be given  Ask your healthcare provider how to take this medicine safely  Some prescription pain medicines contain acetaminophen  Do not take other medicines that contain acetaminophen without talking to your healthcare provider  Too much acetaminophen may cause liver damage  Prescription pain medicine may cause constipation  Ask your healthcare provider how to prevent or treat constipation  · Acetaminophen  decreases pain and fever   It is available without a doctor's order  Ask how much to take and how often to take it  Follow directions  Read the labels of all other medicines you are using to see if they also contain acetaminophen, or ask your doctor or pharmacist  Acetaminophen can cause liver damage if not taken correctly  Do not use more than 4 grams (4,000 milligrams) total of acetaminophen in one day  · NSAIDs , such as ibuprofen, help decrease swelling, pain, and fever  NSAIDs can cause stomach bleeding or kidney problems in certain people  If you take blood thinner medicine, always ask your healthcare provider if NSAIDs are safe for you  Always read the medicine label and follow directions  · Take your medicine as directed  Contact your healthcare provider if you think your medicine is not helping or if you have side effects  Tell him or her if you are allergic to any medicine  Keep a list of the medicines, vitamins, and herbs you take  Include the amounts, and when and why you take them  Bring the list or the pill bottles to follow-up visits  Carry your medicine list with you in case of an emergency  Wound care:  Carefully wash your incision wound with soap and water every day  Keep your wound clean and dry  Wear loose, comfortable clothes that do not rub against your wound  Ask about bathing and showering  Limit activity as directed:   · Ask when it is safe for you to drive, walk up stairs, lift heavy objects, and have sex  · Ask when it is okay to exercise, and what types of exercise to do  Start slowly and do more as you get stronger  Drink liquids as directed:  Liquids help keep you hydrated after your procedure and decrease your risk for a blood clot  Ask how much liquid to drink each day and which liquids are best for you  Follow up with your obstetrician as directed: You may need to return to have your stitches or staples removed  Write down your questions so you remember to ask them during your visits    © [place] [address] [address] Information is for End User's use only and may not be sold, redistributed or otherwise used for commercial purposes  All illustrations and images included in CareNotes® are the copyrighted property of [employer]  or Megan [employer]  The above information is an  only  It is not intended as medical advice for individual conditions or treatments  Talk to your doctor, nurse or pharmacist before following any medical regimen to see if it is safe and effective for you

## 2021-01-21 NOTE — PLAN OF CARE
Problem: PAIN - ADULT  Goal: Verbalizes/displays adequate comfort level or baseline comfort level  Description: Interventions:  - Encourage patient to monitor pain and request assistance  - Assess pain using appropriate pain scale  - Administer analgesics based on type and severity of pain and evaluate response  - Implement non-pharmacological measures as appropriate and evaluate response  - Consider cultural and social influences on pain and pain management  - Notify physician/advanced practitioner if interventions unsuccessful or patient reports new pain  Outcome: Progressing     Problem: INFECTION - ADULT  Goal: Absence or prevention of progression during hospitalization  Description: INTERVENTIONS:  - Assess and monitor for signs and symptoms of infection  - Monitor lab/diagnostic results  - Monitor all insertion sites, i e  indwelling lines, tubes, and drains  - Monitor endotracheal if appropriate and nasal secretions for changes in amount and color  - [place] appropriate cooling/warming therapies per order  - Administer medications as ordered  - Instruct and encourage patient and family to use good hand hygiene technique  - Identify and instruct in appropriate isolation precautions for identified infection/condition  Outcome: Progressing  Goal: Absence of fever/infection during neutropenic period  Description: INTERVENTIONS:  - Monitor WBC    Outcome: Progressing     Problem: SAFETY ADULT  Goal: Patient will remain free of falls  Description: INTERVENTIONS:  - Assess patient frequently for physical needs  -  Identify cognitive and physical deficits and behaviors that affect risk of falls    -  [place] fall precautions as indicated by assessment   - Educate patient/family on patient safety including physical limitations  - Instruct patient to call for assistance with activity based on assessment  - Modify environment to reduce risk of injury  - Consider OT/PT consult to assist with strengthening/mobility  Outcome: Progressing  Goal: Maintain or return to baseline ADL function  Description: INTERVENTIONS:  -  Assess patient's ability to carry out ADLs; assess patient's baseline for ADL function and identify physical deficits which impact ability to perform ADLs (bathing, care of mouth/teeth, toileting, grooming, dressing, etc )  - Assess/evaluate cause of self-care deficits   - Assess range of motion  - Assess patient's mobility; develop plan if impaired  - Assess patient's need for assistive devices and provide as appropriate  - Encourage maximum independence but intervene and supervise when necessary  - Involve family in performance of ADLs  - Assess for home care needs following discharge   - Consider OT consult to assist with ADL evaluation and planning for discharge  - Provide patient education as appropriate  Outcome: Progressing  Goal: Maintain or return mobility status to optimal level  Description: INTERVENTIONS:  - Assess patient's baseline mobility status (ambulation, transfers, stairs, etc )    - Identify cognitive and physical deficits and behaviors that affect mobility  - Identify mobility aids required to assist with transfers and/or ambulation (gait belt, sit-to-stand, lift, walker, cane, etc )  - [place] fall precautions as indicated by assessment  - Record patient progress and toleration of activity level on Mobility SBAR; progress patient to next Phase/Stage  - Instruct patient to call for assistance with activity based on assessment  - Consider rehabilitation consult to assist with strengthening/weightbearing, etc   Outcome: Progressing     Problem: Knowledge Deficit  Goal: Patient/family/caregiver demonstrates understanding of disease process, treatment plan, medications, and discharge instructions  Description: Complete learning assessment and assess knowledge base    Interventions:  - Provide teaching at level of understanding  - Provide teaching via preferred learning methods  Outcome: Progressing     Problem: DISCHARGE PLANNING  Goal: Discharge to home or other facility with appropriate resources  Description: INTERVENTIONS:  - Identify barriers to discharge w/patient and caregiver  - Arrange for needed discharge resources and transportation as appropriate  - Identify discharge learning needs (meds, wound care, etc )  - Arrange for interpretive services to assist at discharge as needed  - Refer to Case Management Department for coordinating discharge planning if the patient needs post-hospital services based on physician/advanced practitioner order or complex needs related to functional status, cognitive ability, or social support system  Outcome: Progressing     Problem: POSTPARTUM  Goal: Experiences normal postpartum course  Description: INTERVENTIONS:  - Monitor maternal vital signs  - Assess uterine involution and lochia  Outcome: Progressing  Goal: Appropriate maternal -  bonding  Description: INTERVENTIONS:  - Identify family support  - Assess for appropriate maternal/infant bonding   -Encourage maternal/infant bonding opportunities  - Referral to  or  as needed  Outcome: Progressing  Goal: Establishment of infant feeding pattern  Description: INTERVENTIONS:  - Assess breast/bottle feeding  - Refer to lactation as needed  Outcome: Progressing  Goal: Incision(s), wounds(s) or drain site(s) healing without S/S of infection  Description: INTERVENTIONS  - Assess and document risk factors for skin impairment   - Assess and document dressing, incision, wound bed, drain sites and surrounding tissue  - Consider nutrition services referral as needed  - Oral mucous membranes remain intact  - Provide patient/ family education  Outcome: Progressing

## 2021-01-22 ENCOUNTER — TRANSITIONAL CARE MANAGEMENT (OUTPATIENT)
Dept: FAMILY MEDICINE CLINIC | Facility: MEDICAL CENTER | Age: 41
End: 2021-01-22

## 2021-01-22 NOTE — UTILIZATION REVIEW
Notification of Maternity/Delivery & [place] Birth Information for Admission - OB FAX # HAVING ISSUES PLEASE FORWARD TO OB QUEUE   Notification of Maternity/Delivery for Admission to our facility [address]  Be advised that this patient was admitted to our facility under Inpatient Status  Contact Jamal Craig at [phone] for additional admission information  [address] PARENT/CHILD HEALTH UR DEPT DEDICATED Saskia Rose [phone]  Mother &  Information   Patient Name: June Bloom   YOB: 1980   Delivering clinician: Jayson Echavarria   OB History        5    Para   4    Term   2       2    AB   1    Living   4       SAB   1    TAB   0    Ectopic   0    Multiple   0    Live Births   4                Name & MRN:   Information for the patient's :  Zen Arias [[id number]]      Delivery Information:  Sex: female  Delivered 2021 12:18 PM by , Low Transverse; Gestational Age: 37w1d     Measurements:  Weight: 5 lb 11 oz (2580 g); Height: 18 5"    APGAR 1 minute 5 minutes 10 minutes   Totals: 9 9      [place] Birth Information: 45 y o  female MRN: [id number] Unit/Bed#: -01 Estimated Date of Delivery: 21  Birthweight: No birth weight on file   Gestational Age: <None> Delivery Type: , Low Transverse      Authorization Information   Servicing Facility:   [facility]  Tax ID: [id number]  NPI: [id number] Attending Provider/NPI:  Phone:  Address: Santana Moya [[id number]]  [phone]  Same as GIANFRANCO/Alfonzo Ortega 1106 of Service Code:  24 Place of Service Name: [address] [place] [address]   Start Date: 21   Discharge Date & Time: 2021  5:30 PM    Type of Admission: Inpatient Status Discharge Disposition (if discharged): Home/Self Care   Patient Diagnoses:   History of classical  section [Z98 891]  Encounter for  delivery without indication [O82]  The primary encounter diagnosis was S/P  section  Diagnoses of 36 weeks gestation of pregnancy and History of classical  section were also pertinent to this visit  1  S/P  section    2  36 weeks gestation of pregnancy    3  History of classical  section       Orders: Admission Orders (From admission, onward)     Ordered        21 0845  INPATIENT ADMISSION  Once                    Assigned Utilization Review Contact: Adalid Randolph  Utilization   Network Utilization Review Department  Phone: [phone]; Fax [phone]  Email: [email or web address]

## 2021-01-25 LAB — PLACENTA IN STORAGE: NORMAL

## 2021-02-10 ENCOUNTER — POSTPARTUM VISIT (OUTPATIENT)
Dept: OBGYN CLINIC | Facility: CLINIC | Age: 41
End: 2021-02-10

## 2021-02-10 VITALS
BODY MASS INDEX: 33.86 KG/M2 | SYSTOLIC BLOOD PRESSURE: 145 MMHG | WEIGHT: 184 LBS | DIASTOLIC BLOOD PRESSURE: 93 MMHG | HEART RATE: 79 BPM | HEIGHT: 62 IN

## 2021-02-10 DIAGNOSIS — N30.00 ACUTE CYSTITIS WITHOUT HEMATURIA: Primary | ICD-10-CM

## 2021-02-10 DIAGNOSIS — R10.2 PELVIC PAIN: ICD-10-CM

## 2021-02-10 LAB
SL AMB  POCT GLUCOSE, UA: ABNORMAL
SL AMB LEUKOCYTE ESTERASE,UA: ABNORMAL
SL AMB POCT BLOOD,UA: ABNORMAL
SL AMB POCT CLARITY,UA: ABNORMAL
SL AMB POCT COLOR,UA: ABNORMAL
SL AMB POCT KETONES,UA: ABNORMAL
SL AMB POCT PH,UA: 5
SL AMB POCT SPECIFIC GRAVITY,UA: 1.02
SL AMB POCT URINE PROTEIN: 2000

## 2021-02-10 PROCEDURE — 81002 URINALYSIS NONAUTO W/O SCOPE: CPT | Performed by: OBSTETRICS & GYNECOLOGY

## 2021-02-10 PROCEDURE — 87086 URINE CULTURE/COLONY COUNT: CPT | Performed by: STUDENT IN AN ORGANIZED HEALTH CARE EDUCATION/TRAINING PROGRAM

## 2021-02-10 PROCEDURE — 99213 OFFICE O/P EST LOW 20 MIN: CPT | Performed by: OBSTETRICS & GYNECOLOGY

## 2021-02-10 RX ORDER — NITROFURANTOIN 25; 75 MG/1; MG/1
100 CAPSULE ORAL 2 TIMES DAILY
Qty: 14 CAPSULE | Refills: 0 | Status: SHIPPED | OUTPATIENT
Start: 2021-02-10 | End: 2021-02-17

## 2021-02-10 RX ORDER — NIFEDIPINE 30 MG/1
30 TABLET, EXTENDED RELEASE ORAL DAILY
Qty: 30 TABLET | Refills: 0 | Status: SHIPPED | OUTPATIENT
Start: 2021-02-10

## 2021-02-11 ENCOUNTER — LAB (OUTPATIENT)
Dept: LAB | Facility: MEDICAL CENTER | Age: 41
End: 2021-02-11
Payer: COMMERCIAL

## 2021-02-11 ENCOUNTER — TELEPHONE (OUTPATIENT)
Dept: OBGYN CLINIC | Facility: CLINIC | Age: 41
End: 2021-02-11

## 2021-02-11 LAB
ALBUMIN SERPL BCP-MCNC: 3.7 G/DL (ref 3.5–5)
ALP SERPL-CCNC: 95 U/L (ref 46–116)
ALT SERPL W P-5'-P-CCNC: 15 U/L (ref 12–78)
ANION GAP SERPL CALCULATED.3IONS-SCNC: 4 MMOL/L (ref 4–13)
AST SERPL W P-5'-P-CCNC: 9 U/L (ref 5–45)
BACTERIA UR CULT: NORMAL
BASOPHILS # BLD AUTO: 0.02 THOUSANDS/ΜL (ref 0–0.1)
BASOPHILS NFR BLD AUTO: 0 % (ref 0–1)
BILIRUB SERPL-MCNC: 0.62 MG/DL (ref 0.2–1)
BUN SERPL-MCNC: 6 MG/DL (ref 5–25)
CALCIUM SERPL-MCNC: 8.6 MG/DL (ref 8.3–10.1)
CHLORIDE SERPL-SCNC: 107 MMOL/L (ref 100–108)
CO2 SERPL-SCNC: 29 MMOL/L (ref 21–32)
CREAT SERPL-MCNC: 0.4 MG/DL (ref 0.6–1.3)
EOSINOPHIL # BLD AUTO: 0.23 THOUSAND/ΜL (ref 0–0.61)
EOSINOPHIL NFR BLD AUTO: 3 % (ref 0–6)
ERYTHROCYTE [DISTWIDTH] IN BLOOD BY AUTOMATED COUNT: 17 % (ref 11.6–15.1)
GFR SERPL CREATININE-BSD FRML MDRD: 132 ML/MIN/1.73SQ M
GLUCOSE SERPL-MCNC: 80 MG/DL (ref 65–140)
HCT VFR BLD AUTO: 30.1 % (ref 34.8–46.1)
HGB BLD-MCNC: 10.4 G/DL (ref 11.5–15.4)
IMM GRANULOCYTES # BLD AUTO: 0.06 THOUSAND/UL (ref 0–0.2)
IMM GRANULOCYTES NFR BLD AUTO: 1 % (ref 0–2)
LYMPHOCYTES # BLD AUTO: 2.24 THOUSANDS/ΜL (ref 0.6–4.47)
LYMPHOCYTES NFR BLD AUTO: 25 % (ref 14–44)
MCH RBC QN AUTO: 32.2 PG (ref 26.8–34.3)
MCHC RBC AUTO-ENTMCNC: 34.6 G/DL (ref 31.4–37.4)
MCV RBC AUTO: 93 FL (ref 82–98)
MONOCYTES # BLD AUTO: 0.43 THOUSAND/ΜL (ref 0.17–1.22)
MONOCYTES NFR BLD AUTO: 5 % (ref 4–12)
NEUTROPHILS # BLD AUTO: 6.09 THOUSANDS/ΜL (ref 1.85–7.62)
NEUTS SEG NFR BLD AUTO: 66 % (ref 43–75)
NRBC BLD AUTO-RTO: 0 /100 WBCS
PLATELET # BLD AUTO: 315 THOUSANDS/UL (ref 149–390)
PMV BLD AUTO: 10 FL (ref 8.9–12.7)
POTASSIUM SERPL-SCNC: 3.5 MMOL/L (ref 3.5–5.3)
PROT SERPL-MCNC: 7.3 G/DL (ref 6.4–8.2)
RBC # BLD AUTO: 3.23 MILLION/UL (ref 3.81–5.12)
SODIUM SERPL-SCNC: 140 MMOL/L (ref 136–145)
WBC # BLD AUTO: 9.07 THOUSAND/UL (ref 4.31–10.16)

## 2021-02-11 PROCEDURE — 36415 COLL VENOUS BLD VENIPUNCTURE: CPT

## 2021-02-11 PROCEDURE — 80053 COMPREHEN METABOLIC PANEL: CPT

## 2021-02-11 PROCEDURE — 85025 COMPLETE CBC W/AUTO DIFF WBC: CPT

## 2021-02-11 NOTE — PROGRESS NOTES
Assessment/Plan     Patient is a 46 yo  3 weeks postop s/p RLTCS who is here with dysuria, desires for sterilization and elevated blood pressures in the setting of a history of preeclampsia  1  Dysuria   -UA negative today  Will send for urine culture  As patient is symptomatic, will start on PO macrobid 100 mg BID x 7 days  2  Elevated blood pressures   -Given her history of preeclampsia, advise patient to obtain bloodwork CMP/CBC today after her office visit today  Patient had blood pressure cuff at home and states her blood pressures are usually 182-588S systolic  Precautions for preeclampsia provided with low threshold to return to L&D for further evaluation   -Will start patient on PO procardia 30 mg XL today and reassess next week  3  Desire for sterilization   -Briefly discussed sterilization today  Patient will need MA-31 paperwork signed at next visit   -We discussed OCPs, depo-provera, nexplanon and IUD as possible bridge or alternative contraception, patient declines at this time; she is aware she can get pregnant again  4  RTO in one week for further evaluation      Idalmis Rivas is a 45 y o  [id number] female who presents for a postpartum visit  Her pregnancy was complicated by prior  with T incision, history of preeclampsia, anemia Rh negative  She delivered via RLTCS on , 5 lb 11oz, female   Her delivery course was complicated by hemoglobin drop of 9 5 to 6 7 requiring one unit of blood transfusion  Bleeding brown  Bowel function is normal  Bladder function is abnormal: patient is able to void but noticed pain after emptying  Patient has not been sexually active  Desired contraception method is sterilization  Patient did not get depo-provera in the hospital  Patient was noted to have blood pressure of 163/102 followed by 15 min repeat of 145/93  Denies headache, visual changes, epigastric changes    Postpartum depression screening: negative  Baby's course has been uncomplicated  Baby is feeding by bottle    Last Pap : 10/16/19: neg cytology and HPV   Gestational Diabetes: no      Current Outpatient Medications:     acetaminophen (TYLENOL) 500 mg tablet, Take 500 mg by mouth every 6 (six) hours as needed for mild pain, Disp: , Rfl:     ferrous sulfate 325 (65 Fe) mg tablet, Take 325 mg by mouth daily with breakfast, Disp: , Rfl:     ibuprofen (MOTRIN) 600 mg tablet, Take 1 tablet (600 mg total) by mouth every 6 (six) hours, Disp: 30 tablet, Rfl: 3    Prenatal Vit-Fe Fum-FA-Omega (Prenatal Multi +DHA) 27-0 8-228 MG CAPS, Take 1 caplet by mouth daily, Disp: 30 capsule, Rfl: 12    docusate sodium (COLACE) 100 mg capsule, Take 1 capsule (100 mg total) by mouth 2 (two) times a day (Patient not taking: Reported on 2/10/2021), Disp: 10 capsule, Rfl: 0    NIFEdipine (PROCARDIA XL) 30 mg 24 hr tablet, Take 1 tablet (30 mg total) by mouth daily, Disp: 30 tablet, Rfl: 0    nitrofurantoin (MACROBID) 100 mg capsule, Take 1 capsule (100 mg total) by mouth 2 (two) times a day for 7 days, Disp: 14 capsule, Rfl: 0    Allergies   Allergen Reactions    Penicillins Other (See Comments)     Took as a child and had trouble breathing per pt mom    Ciprofloxacin Rash       Review of Systems  Constitutional: no fever, feels well  Breasts: no complaints of breast pain, breast lump, or nipple discharge  Gastrointestinal: no complaints nausea, vomiting  Genitourinary: as noted in HPI  Neurological: no complaints of headache    Objective      /93   Pulse 79   Ht 5' 2" (1 575 m)   Wt 83 5 kg (184 lb)   LMP 06/29/2020 (Exact Date)   BMI 33 65 kg/m²     Physical Exam  Constitutional:       Appearance: She is well-developed  Genitourinary:      Genitourinary Comments: Suprapubic tenderness   Cardiovascular:      Rate and Rhythm: Normal rate and regular rhythm  Pulmonary:      Effort: Pulmonary effort is normal       Breath sounds: Normal breath sounds  Abdominal:      Tenderness: There is no abdominal tenderness  There is no guarding  Comments: Uterus firm 2 cm below umbilicus, incision is c/d/i   Musculoskeletal: Normal range of motion  Neurological:      Mental Status: She is alert and oriented to person, place, and time  Skin:     General: Skin is warm and dry     Psychiatric:         Behavior: Behavior normal

## 2021-02-11 NOTE — TELEPHONE ENCOUNTER
----- Message from Ivana Chao MD sent at 2/11/2021  8:56 AM EST -----  Regarding: one week follow up  [address],     I just noticed this patient did not make a follow up appointment in one week for blood pressure check/ MA-31 paperwork needs to be signed  Can you also remind her to get her bloodwork done asap as well as her blood pressure was elevated in the office  Thanks!     Pietro Grant

## 2021-02-11 NOTE — TELEPHONE ENCOUNTER
Left voicemail for pt to complete blood work and call our office to schedule a BP check appointment for next week  Office number provided  [employer] message sent as well

## 2021-02-12 ENCOUNTER — TELEPHONE (OUTPATIENT)
Dept: OBGYN CLINIC | Facility: CLINIC | Age: 41
End: 2021-02-12

## 2021-02-12 NOTE — TELEPHONE ENCOUNTER
----- Message from Kimo Rodas MD sent at 2/12/2021  1:07 PM EST -----  Please let patient know that her bloodwork and urine were normal  She should still follow up with us next week as she was just started on new blood pressure medication and would like to make sure her blood pressure is better controlled, thanks!

## 2021-03-15 RX ORDER — NIFEDIPINE 30 MG/1
TABLET, EXTENDED RELEASE ORAL
Qty: 30 TABLET | Refills: 0 | OUTPATIENT
Start: 2021-03-15

## 2021-07-27 ENCOUNTER — APPOINTMENT (OUTPATIENT)
Dept: RADIOLOGY | Facility: MEDICAL CENTER | Age: 41
End: 2021-07-27
Payer: COMMERCIAL

## 2021-07-27 ENCOUNTER — OFFICE VISIT (OUTPATIENT)
Dept: URGENT CARE | Facility: MEDICAL CENTER | Age: 41
End: 2021-07-27
Payer: COMMERCIAL

## 2021-07-27 VITALS
OXYGEN SATURATION: 100 % | HEIGHT: 62 IN | DIASTOLIC BLOOD PRESSURE: 70 MMHG | RESPIRATION RATE: 18 BRPM | WEIGHT: 186 LBS | BODY MASS INDEX: 34.23 KG/M2 | HEART RATE: 107 BPM | SYSTOLIC BLOOD PRESSURE: 140 MMHG | TEMPERATURE: 97.4 F
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
## 2021-07-27 DIAGNOSIS — S69.91XA INJURY OF RIGHT HAND, INITIAL ENCOUNTER: ICD-10-CM
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
## 2021-07-27 PROCEDURE — 73130 X-RAY EXAM OF HAND: CPT

## 2021-07-27 PROCEDURE — 99213 OFFICE O/P EST LOW 20 MIN: CPT | Performed by: PHYSICIAN ASSISTANT

## 2021-07-27 PROCEDURE — 73110 X-RAY EXAM OF WRIST: CPT

## 2021-07-28 NOTE — PROGRESS NOTES
[facility] [facility]        NAME: Dipti Tse is a 45 y o  female  : 1980    MRN: [id number]  DATE: 2021  TIME: 9:25 PM    Assessment and Plan   Fall, initial encounter [W19  XXXA]  1  Fall, initial encounter  XR wrist 3+ vw right    XR hand 3+ vw right   2  Injury of right hand, initial encounter         X-ray shows what appears to be healed old 5th metacarpal head fracture  Do not see any new fractures  Recommended Tylenol or Motrin for pain and icing  Patient Instructions     Tylenol or Motrin for pain   Ice the hand and wrist  Follow up with PCP in 3-5 days  Proceed to  ER if symptoms worsen  Chief Complaint     Chief Complaint   Patient presents with    Hand Pain     right hand pain fell yesterday          History of Present Illness         Patient is a 76-year-old female who presents today with right hand and wrist pain after falling yesterday  Patient fell on the back of the wrist and hit the hand  Reports pain over the 4th and 5th metatarsal area into the fingers and mild wrist pain  No numbness or tingling  Review of Systems   Review of Systems   Constitutional: Negative for fever  Musculoskeletal: Positive for arthralgias and joint swelling  Skin: Negative for color change and wound           Current Medications       Current Outpatient Medications:     acetaminophen (TYLENOL) 500 mg tablet, Take 500 mg by mouth every 6 (six) hours as needed for mild pain, Disp: , Rfl:     docusate sodium (COLACE) 100 mg capsule, Take 1 capsule (100 mg total) by mouth 2 (two) times a day, Disp: 10 capsule, Rfl: 0    ferrous sulfate 325 (65 Fe) mg tablet, Take 325 mg by mouth daily with breakfast, Disp: , Rfl:     ibuprofen (MOTRIN) 600 mg tablet, Take 1 tablet (600 mg total) by mouth every 6 (six) hours, Disp: 30 tablet, Rfl: 3    Prenatal Vit-Fe Fum-FA-Omega (Prenatal Multi +DHA) 27-0 8-228 MG CAPS, Take 1 caplet by mouth daily, Disp: 30 capsule, Rfl: 12    NIFEdipine (PROCARDIA XL) 30 mg 24 hr tablet, Take 1 tablet (30 mg total) by mouth daily (Patient not taking: Reported on 2021), Disp: 30 tablet, Rfl: 0    Current Allergies     Allergies as of 2021 - Reviewed 2021   Allergen Reaction Noted    Penicillins Other (See Comments) 2016    Ciprofloxacin Rash 2018            The following portions of the patient's history were reviewed and updated as appropriate: allergies, current medications, past family history, past medical history, past social history, past surgical history and problem list      Past Medical History:   Diagnosis Date    , missed 10/12/2017    AMA (advanced maternal age) multigravida 35+     Anemia     Anemia     Complication of anesthesia     Deep vein thrombosis ([facility] [address] )         Endometriosis     History of transfusion     Hypertension     Pre-E, and essential    Obesity 3/6/2018    Pre-eclampsia in third trimester     Varicella     had chicken pox       Past Surgical History:   Procedure Laterality Date    APPENDECTOMY      APPENDECTOMY      Last Assessed: 10/12/2017- Laparoscopic      SECTION      EAR SURGERY      Eustachian Tube     EXPLORATORY LAPAROTOMY      for endometriosis    ID  DELIVERY ONLY Bilateral 7/3/2018    Procedure:  SECTION ();   Surgeon: Huma Chambers DO;  Location: [facility];  Service: Obstetrics    ID  DELIVERY ONLY N/A 2021    Procedure: Rocio Watts () REPEAT;  Surgeon: Butch Nobles MD;  Location: [facility] ;  Service: Obstetrics    TONSILLECTOMY         Family History   Problem Relation Age of Onset    Diabetes Mother         type 2    Diabetes Father         type 2    No Known Problems Brother     No Known Problems Daughter     Diabetes Maternal Grandmother         type 2    Stroke Maternal Grandfather     No Known Problems Brother     No Known Problems Daughter     No Known Problems Daughter Medications have been verified  Objective   /70   Pulse (!) 107   Temp (!) 97 4 °F (36 3 °C)   Resp 18   Ht 5' 2" (1 575 m)   Wt 84 4 kg (186 lb)   LMP 07/27/2021   SpO2 100%   Breastfeeding No   BMI 34 02 kg/m²        Physical Exam     Physical Exam  Constitutional:       General: She is not in acute distress  Appearance: Normal appearance  She is not ill-appearing  Cardiovascular:      Rate and Rhythm: Normal rate and regular rhythm  Pulmonary:      Effort: Pulmonary effort is normal    Musculoskeletal:         General: Swelling and tenderness present  No deformity  Right wrist: Swelling and tenderness present  No bony tenderness or snuff box tenderness  Normal range of motion  Right hand: Swelling, tenderness and bony tenderness present  No deformity  Decreased range of motion  Normal strength  Abnormal pulse  Comments:  Mild swelling with TTP over 4th and 5th metacarpal area into the base of the 4th and 5th phalanx  There is some swelling and tenderness noted over the dorsum of the wrist but has full range of motion of the wrist    Skin:     General: Skin is warm and dry  Neurological:      Mental Status: She is alert       =

## 2023-02-25 ENCOUNTER — VBI (OUTPATIENT)
Dept: ADMINISTRATIVE | Facility: OTHER | Age: 43
End: 2023-02-25

## 2023-05-11 NOTE — ANESTHESIA POSTPROCEDURE EVALUATION
Post-Op Assessment Note      CV Status:  Stable    Mental Status:  Alert and awake    Hydration Status:  Euvolemic    PONV Controlled:  None    Airway Patency:  Patent  Airway: intubated    Post Op Vitals Reviewed: Yes          Staff: Anesthesiologist, with CRNAs           BP      Temp      Pulse     Resp      SpO2
Post-Op Assessment Note      CV Status:  Stable    Mental Status:  Alert and awake    Hydration Status:  Euvolemic    PONV Controlled:  None    Airway Patency:  Patent  Airway: intubated    Post Op Vitals Reviewed: Yes          Staff: Anesthesiologist, with CRNAs           BP (P) 156/88 (07/04/18 0000)    Temp (P) 98 4 °F (36 9 °C) (07/04/18 0000)    Pulse (!) (P) 120 (07/04/18 0000)   Resp (P) 18 (07/04/18 0000)    SpO2
Moderate Variability

## 2023-06-07 ENCOUNTER — VBI (OUTPATIENT)
Dept: ADMINISTRATIVE | Facility: OTHER | Age: 43
End: 2023-06-07

## 2023-08-19 ENCOUNTER — OFFICE VISIT (OUTPATIENT)
Dept: URGENT CARE | Facility: MEDICAL CENTER | Age: 43
End: 2023-08-19
Payer: COMMERCIAL

## 2023-08-19 VITALS
SYSTOLIC BLOOD PRESSURE: 142 MMHG | RESPIRATION RATE: 18 BRPM | BODY MASS INDEX: 32.2 KG/M2 | HEART RATE: 80 BPM | DIASTOLIC BLOOD PRESSURE: 72 MMHG | HEIGHT: 62 IN | OXYGEN SATURATION: 99 % | TEMPERATURE: 98.9 F | WEIGHT: 175 LBS

## 2023-08-19 DIAGNOSIS — K08.89 PAIN, DENTAL: Primary | ICD-10-CM

## 2023-08-19 PROCEDURE — 99213 OFFICE O/P EST LOW 20 MIN: CPT | Performed by: PHYSICIAN ASSISTANT

## 2023-08-19 RX ORDER — CLINDAMYCIN HYDROCHLORIDE 300 MG/1
300 CAPSULE ORAL 3 TIMES DAILY
Qty: 21 CAPSULE | Refills: 0 | Status: SHIPPED | OUTPATIENT
Start: 2023-08-19 | End: 2023-08-26

## 2023-08-19 NOTE — PROGRESS NOTES
[facility]        NAME: Cecile Arzola is a 40 y.o. female  : 1980    MRN: [id number]  DATE: 2023  TIME: 6:23 PM    Assessment and Plan   Pain, dental [K08.89]  1. Pain, dental  clindamycin (CLEOCIN) 300 MG capsule            Patient Instructions     1. Motrin as needed for pain  2. Take Clindamycin 300mg. 3x daily x 7 days  3. Follow-up with Dentist ASAP  4. Proceed to  ER if symptoms worsen. Chief Complaint     Chief Complaint   Patient presents with   • Dental Pain     Right upper dental pain; broken tooth; per patient was told to come into urgent care due to swelling          History of Present Illness       Crystal is a 73-year-old female who presents with a 1 week history of right-sided dental pain. Patient reports she broke a tooth approximately 3 weeks prior. She has been noticing dull discomfort in her right upper gumline over the past week. Patient reports her symptoms increased over the past 24 hours with increasing pain and right-sided facial swelling. Review of Systems   Review of Systems   Constitutional: Negative. HENT: Positive for dental problem. Respiratory: Negative. Gastrointestinal: Negative.           Current Medications       Current Outpatient Medications:   •  clindamycin (CLEOCIN) 300 MG capsule, Take 1 capsule (300 mg total) by mouth 3 (three) times a day for 7 days, Disp: 21 capsule, Rfl: 0  •  acetaminophen (TYLENOL) 500 mg tablet, Take 500 mg by mouth every 6 (six) hours as needed for mild pain, Disp: , Rfl:   •  docusate sodium (COLACE) 100 mg capsule, Take 1 capsule (100 mg total) by mouth 2 (two) times a day, Disp: 10 capsule, Rfl: 0  •  ferrous sulfate 325 (65 Fe) mg tablet, Take 325 mg by mouth daily with breakfast, Disp: , Rfl:   •  ibuprofen (MOTRIN) 600 mg tablet, Take 1 tablet (600 mg total) by mouth every 6 (six) hours, Disp: 30 tablet, Rfl: 3  •  NIFEdipine (PROCARDIA XL) 30 mg 24 hr tablet, Take 1 tablet (30 mg total) by mouth daily (Patient not taking: Reported on 2021), Disp: 30 tablet, Rfl: 0  •  Prenatal Vit-Fe Fum-FA-Omega (Prenatal Multi +DHA) 27-0.8-228 MG CAPS, Take 1 caplet by mouth daily, Disp: 30 capsule, Rfl: 12    Current Allergies     Allergies as of 2023 - Reviewed 2023   Allergen Reaction Noted   • Penicillins Other (See Comments) 2016   • Ciprofloxacin Rash 2018            The following portions of the patient's history were reviewed and updated as appropriate: allergies, current medications, past family history, past medical history, past social history, past surgical history and problem list.     Past Medical History:   Diagnosis Date   • , missed 10/12/2017   • AMA (advanced maternal age) multigravida 35+    • Anemia    • Anemia    • Complication of anesthesia    • Deep vein thrombosis ([address])        • Endometriosis    • History of transfusion    • Hypertension     Pre-E, and essential   • Obesity 3/6/2018   • Pre-eclampsia in third trimester    • Varicella     had chicken pox       Past Surgical History:   Procedure Laterality Date   • APPENDECTOMY     • APPENDECTOMY      Last Assessed: 10/12/2017- Laparoscopic    •  SECTION     • EAR SURGERY      Eustachian Tube    • EXPLORATORY LAPAROTOMY      for endometriosis   • OR  DELIVERY ONLY Bilateral 7/3/2018    Procedure:  SECTION ();   Surgeon: Monserrat Robertson DO;  [facility] ;  Service: Obstetrics   • OR  DELIVERY ONLY N/A 2021    Procedure: Jacquelin Dobson () REPEAT;  Surgeon: Lee Moulton MD;  Location: [facility] ;  Service: Obstetrics   • TONSILLECTOMY         Family History   Problem Relation Age of Onset   • Diabetes Mother         type 2   • Diabetes Father         type 2   • No Known Problems Brother    • No Known Problems Daughter    • Diabetes Maternal Grandmother         type 2   • Stroke Maternal Grandfather    • No Known Problems Brother    • No Known Problems Daughter    • No Known Problems Daughter          Medications have been verified. Objective   /72   Pulse 80   Temp 98.9 °F (37.2 °C) (Temporal)   Resp 18   Ht 5' 2" (1.575 m)   Wt 79.4 kg (175 lb)   SpO2 99%   BMI 32.01 kg/m²   No LMP recorded. Physical Exam     Physical Exam  Constitutional:       General: She is not in acute distress. Appearance: Normal appearance. She is not ill-appearing. HENT:      Head: Normocephalic and atraumatic. Right Ear: Tympanic membrane and ear canal normal.      Left Ear: Tympanic membrane and ear canal normal.      Nose: Nose normal.      Mouth/Throat:      Lips: Pink. Dentition: Dental tenderness and dental caries present. Pharynx: Oropharynx is clear. Comments: Diffuse erythema and swelling noted over the right upper gumline in the area of tooth 2 and 3. There is a visible cracked #2 tooth and most stump over or tooth #3 would be. Cardiovascular:      Rate and Rhythm: Normal rate and regular rhythm. Heart sounds: Normal heart sounds. No murmur heard. Pulmonary:      Effort: Pulmonary effort is normal.      Breath sounds: Normal breath sounds. Neurological:      Mental Status: She is alert.

## 2023-08-19 NOTE — PATIENT INSTRUCTIONS
1. Motrin as needed for pain  2. Take Clindamycin 300mg. 3x daily x 7 days  3. Follow-up with Dentist ASAP  4. Proceed to  ER if symptoms worsen.

## 2024-02-25 ENCOUNTER — OFFICE VISIT (OUTPATIENT)
Dept: URGENT CARE | Facility: MEDICAL CENTER | Age: 44
End: 2024-02-25
Payer: COMMERCIAL

## 2024-02-25 VITALS
HEART RATE: 80 BPM | TEMPERATURE: 98 F | RESPIRATION RATE: 18 BRPM | DIASTOLIC BLOOD PRESSURE: 80 MMHG | BODY MASS INDEX: 32.2 KG/M2 | WEIGHT: 175 LBS | OXYGEN SATURATION: 98 % | HEIGHT: 62 IN | SYSTOLIC BLOOD PRESSURE: 130 MMHG

## 2024-02-25 DIAGNOSIS — K04.7 DENTAL ABSCESS: Primary | ICD-10-CM

## 2024-02-25 PROCEDURE — 99213 OFFICE O/P EST LOW 20 MIN: CPT | Performed by: PHYSICIAN ASSISTANT

## 2024-02-25 RX ORDER — CLINDAMYCIN HYDROCHLORIDE 300 MG/1
300 CAPSULE ORAL 3 TIMES DAILY
Qty: 21 CAPSULE | Refills: 0 | Status: SHIPPED | OUTPATIENT
Start: 2024-02-25 | End: 2024-03-03

## 2024-02-26 NOTE — PATIENT INSTRUCTIONS
Motrin as needed for pain  Take Clindamycin 300mg 3x daily x 7 days  Acidophilus daily while on clindamycin  Dental consult ASAP.

## 2024-02-26 NOTE — PROGRESS NOTES
[facility]        NAME: Gaviota Santizo is a 43 y.o. female  : 1980    MRN: [id number]  DATE: 2024  TIME: 7:11 PM    Assessment and Plan   Dental abscess [K04.7]  1. Dental abscess  clindamycin (CLEOCIN) 300 MG capsule            Patient Instructions     Motrin as needed for pain  Take Clindamycin 300mg 3x daily x 7 days  Acidophilus daily while on clindamycin  Dental consult ASAP.       Chief Complaint     Chief Complaint   Patient presents with    Facial Swelling    Earache     Right ear pain associated with facial swelling; hx of broken teeth          History of Present Illness       Gaviota is a 43-year-old female who presents with a 1 day history of right-sided jaw and cheek swelling as well as dental pain.  Patient reports she has numerous broken and decaying teeth.  She reports no gum or tooth drainage.    Earache         Review of Systems   Review of Systems   Constitutional: Negative.    HENT:  Positive for ear pain.    Respiratory: Negative.     Gastrointestinal: Negative.          Current Medications       Current Outpatient Medications:     clindamycin (CLEOCIN) 300 MG capsule, Take 1 capsule (300 mg total) by mouth 3 (three) times a day for 7 days, Disp: 21 capsule, Rfl: 0    acetaminophen (TYLENOL) 500 mg tablet, Take 500 mg by mouth every 6 (six) hours as needed for mild pain, Disp: , Rfl:     docusate sodium (COLACE) 100 mg capsule, Take 1 capsule (100 mg total) by mouth 2 (two) times a day, Disp: 10 capsule, Rfl: 0    ferrous sulfate 325 (65 Fe) mg tablet, Take 325 mg by mouth daily with breakfast, Disp: , Rfl:     ibuprofen (MOTRIN) 600 mg tablet, Take 1 tablet (600 mg total) by mouth every 6 (six) hours, Disp: 30 tablet, Rfl: 3    NIFEdipine (PROCARDIA XL) 30 mg 24 hr tablet, Take 1 tablet (30 mg total) by mouth daily (Patient not taking: Reported on 2021), Disp: 30 tablet, Rfl: 0    Prenatal Vit-Fe Fum-FA-Omega (Prenatal Multi +DHA) 27-0.8-228 MG CAPS, Take  "1 caplet by mouth daily, Disp: 30 capsule, Rfl: 12    Current Allergies     Allergies as of 2024 - Reviewed 2024   Allergen Reaction Noted    Penicillins Other (See Comments) 2016    Ciprofloxacin Rash 2018            The following portions of the patient's history were reviewed and updated as appropriate: allergies, current medications, past family history, past medical history, past social history, past surgical history and problem list.     Past Medical History:   Diagnosis Date    , missed 10/12/2017    AMA (advanced maternal age) multigravida 35+     Anemia     Anemia     Complication of anesthesia     Deep vein thrombosis (HCC)         Endometriosis     History of transfusion     Hypertension     Pre-E, and essential    Obesity 3/6/2018    Pre-eclampsia in third trimester     Varicella     had chicken pox       Past Surgical History:   Procedure Laterality Date    APPENDECTOMY      APPENDECTOMY      Last Assessed: 10/12/2017- Laparoscopic      SECTION      EAR SURGERY      Eustachian Tube     EXPLORATORY LAPAROTOMY      for endometriosis    [place]  DELIVERY ONLY Bilateral 7/3/2018    Procedure:  SECTION ();  Surgeon: Patricia Gomes DO;  [facility];  Service: Obstetrics    [place]  DELIVERY ONLY N/A 2021    Procedure:  SECTION () REPEAT;  Surgeon: Dodie Mays MD;  [facility];  Service: Obstetrics    TONSILLECTOMY         Family History   Problem Relation Age of Onset    Diabetes Mother         type 2    Diabetes Father         type 2    No Known Problems Brother     No Known Problems Daughter     Diabetes Maternal Grandmother         type 2    Stroke Maternal Grandfather     No Known Problems Brother     No Known Problems Daughter     No Known Problems Daughter          Medications have been verified.        Objective   /80   Pulse 80   Temp 98 °F (36.7 °C)   Resp 18   Ht 5' 2\" (1.575 m)   Wt " 79.4 kg (175 lb)   SpO2 98%   BMI 32.01 kg/m²   No LMP recorded.       Physical Exam     Physical Exam  Constitutional:       General: She is not in acute distress.     Appearance: Normal appearance. She is not ill-appearing.   HENT:      Head: Normocephalic.      Comments: There is diffuse swelling over the right side of the cheek and mandibular region     Right Ear: Tympanic membrane and ear canal normal.      Left Ear: Tympanic membrane and ear canal normal.      Nose: Nose normal.      Mouth/Throat:      Lips: Pink.      Dentition: Abnormal dentition. Dental tenderness, gingival swelling, dental caries and dental abscesses present. No gum lesions.   Cardiovascular:      Rate and Rhythm: Normal rate and regular rhythm.      Heart sounds: Normal heart sounds, S1 normal and S2 normal. No murmur heard.  Pulmonary:      Effort: Pulmonary effort is normal.      Breath sounds: Normal breath sounds and air entry.   Neurological:      Mental Status: She is alert.

## 2024-07-10 ENCOUNTER — VBI (OUTPATIENT)
Dept: ADMINISTRATIVE | Facility: OTHER | Age: 44
End: 2024-07-10

## 2024-07-10 NOTE — TELEPHONE ENCOUNTER
07/10/24 7:31 AM     Chart reviewed for Pap Smear (HPV) aka Cervical Cancer Screening ; nothing is submitted to the patient's insurance at this time.     Yara Kerns   [facility]

## 2024-10-22 ENCOUNTER — VBI (OUTPATIENT)
Dept: ADMINISTRATIVE | Facility: OTHER | Age: 44
End: 2024-10-22

## 2024-10-22 NOTE — TELEPHONE ENCOUNTER
10/22/24 8:33 AM     Chart reviewed for BP was/were submitted to the patient's insurance.     Hong Patterson MA   [facility]

## 2025-04-28 ENCOUNTER — VBI (OUTPATIENT)
Dept: ADMINISTRATIVE | Facility: OTHER | Age: 45
End: 2025-04-28

## 2025-04-28 NOTE — TELEPHONE ENCOUNTER
04/28/25 11:49 AM     Chart reviewed for Pap Smear (HPV) aka Cervical Cancer Screening ; nothing is submitted to the patient's insurance at this time.     Yara Kerns   [facility]

## (undated) DEVICE — TUBING SMOKE EVAC W/FILTRATION DEVICE PLUMEPORT ACTIV

## (undated) DEVICE — GLOVE SRG BIOGEL ECLIPSE 7

## (undated) DEVICE — SUT PLAIN 2-0 CTX 27 IN 872H

## (undated) DEVICE — CHLORAPREP HI-LITE 26ML ORANGE

## (undated) DEVICE — BETHLEHEM UNIVERSAL MINOR VAG: Brand: CARDINAL HEALTH

## (undated) DEVICE — Device

## (undated) DEVICE — SUT VICRYL 4-0 KS 27 IN J662H

## (undated) DEVICE — GLOVE INDICATOR PI UNDERGLOVE SZ 7.5 BLUE

## (undated) DEVICE — SUT MONOCRYL 4-0 PS-2 27 IN Y426H

## (undated) DEVICE — SUT VICRYL 0 CT-1 27 IN J260H

## (undated) DEVICE — SUT PLAIN 3-0 CT-1 27 IN 842H

## (undated) DEVICE — SCD SEQUENTIAL COMPRESSION COMFORT SLEEVE MEDIUM KNEE LENGTH: Brand: KENDALL SCD

## (undated) DEVICE — ADHESIVE SKN CLSR HISTOACRYL FLEX 0.5ML LF

## (undated) DEVICE — NEEDLE 25G X 1 1/2

## (undated) DEVICE — PACK C-SECTION PBDS

## (undated) DEVICE — SUT VICRYL 0 CT-1 36 IN J946H

## (undated) DEVICE — INTENDED FOR TISSUE SEPARATION, AND OTHER PROCEDURES THAT REQUIRE A SHARP SURGICAL BLADE TO PUNCTURE OR CUT.: Brand: BARD-PARKER SAFETY BLADES SIZE 11, STERILE

## (undated) DEVICE — MEDI-VAC YANKAUER SUCTION HANDLE W/STRAIGHT TIP & CONTROL VENT: Brand: CARDINAL HEALTH

## (undated) DEVICE — SUT VICRYL 0 CTX 36 IN J978H

## (undated) DEVICE — 3000CC GUARDIAN II: Brand: GUARDIAN

## (undated) DEVICE — GLOVE PI ULTRA TOUCH SZ.7.0

## (undated) DEVICE — PACK PBDS MINOR GYN LAP RF

## (undated) DEVICE — TROCAR: Brand: KII® SLEEVE

## (undated) DEVICE — ADHESIVE SKIN HIGH VISCOSITY EXOFIN 1ML

## (undated) DEVICE — GLOVE INDICATOR PI UNDERGLOVE SZ 7 BLUE

## (undated) DEVICE — SUT MONOCRYL 0 CTX 36 IN Y398H

## (undated) DEVICE — PREMIUM DRY TRAY LF: Brand: MEDLINE INDUSTRIES, INC.

## (undated) DEVICE — CHLORHEXIDINE 4PCT 4 OZ

## (undated) DEVICE — TELFA NON-ADHERENT ABSORBENT DRESSING: Brand: TELFA

## (undated) DEVICE — SKIN MARKER DUAL TIP WITH RULER CAP, FLEXIBLE RULER AND LABELS: Brand: DEVON

## (undated) DEVICE — TROCAR: Brand: KII FIOS FIRST ENTRY

## (undated) DEVICE — SUT MONOCRYL 4-0 PS-2 18 IN Y496G